# Patient Record
Sex: FEMALE | Race: ASIAN | NOT HISPANIC OR LATINO | Employment: FULL TIME | URBAN - METROPOLITAN AREA
[De-identification: names, ages, dates, MRNs, and addresses within clinical notes are randomized per-mention and may not be internally consistent; named-entity substitution may affect disease eponyms.]

---

## 2018-01-23 ENCOUNTER — OFFICE VISIT (OUTPATIENT)
Dept: FAMILY MEDICINE | Facility: CLINIC | Age: 43
End: 2018-01-23
Payer: COMMERCIAL

## 2018-01-23 VITALS
WEIGHT: 139.2 LBS | HEART RATE: 85 BPM | BODY MASS INDEX: 28.06 KG/M2 | HEIGHT: 59 IN | DIASTOLIC BLOOD PRESSURE: 84 MMHG | TEMPERATURE: 98.3 F | OXYGEN SATURATION: 100 % | SYSTOLIC BLOOD PRESSURE: 126 MMHG

## 2018-01-23 DIAGNOSIS — Z23 NEED FOR PROPHYLACTIC VACCINATION AND INOCULATION AGAINST INFLUENZA: ICD-10-CM

## 2018-01-23 DIAGNOSIS — E03.9 HYPOTHYROIDISM, UNSPECIFIED TYPE: ICD-10-CM

## 2018-01-23 DIAGNOSIS — Z01.00 EXAMINATION OF EYES AND VISION: ICD-10-CM

## 2018-01-23 DIAGNOSIS — Z00.00 ROUTINE GENERAL MEDICAL EXAMINATION AT A HEALTH CARE FACILITY: Primary | ICD-10-CM

## 2018-01-23 DIAGNOSIS — R53.83 FATIGUE, UNSPECIFIED TYPE: ICD-10-CM

## 2018-01-23 DIAGNOSIS — Z11.51 SCREENING FOR HUMAN PAPILLOMAVIRUS: ICD-10-CM

## 2018-01-23 DIAGNOSIS — Z13.1 SCREENING FOR DIABETES MELLITUS: ICD-10-CM

## 2018-01-23 DIAGNOSIS — Z12.4 SCREENING FOR CERVICAL CANCER: ICD-10-CM

## 2018-01-23 DIAGNOSIS — F99 INSOMNIA DUE TO OTHER MENTAL DISORDER: ICD-10-CM

## 2018-01-23 DIAGNOSIS — H57.9 ITCHY EYES: ICD-10-CM

## 2018-01-23 DIAGNOSIS — N89.8 VAGINAL DISCHARGE: ICD-10-CM

## 2018-01-23 DIAGNOSIS — Z13.220 SCREENING FOR LIPOID DISORDERS: ICD-10-CM

## 2018-01-23 DIAGNOSIS — F51.05 INSOMNIA DUE TO OTHER MENTAL DISORDER: ICD-10-CM

## 2018-01-23 LAB
HBA1C MFR BLD: 5.8 % (ref 4.3–6)
SPECIMEN SOURCE: NORMAL
WET PREP SPEC: NORMAL

## 2018-01-23 PROCEDURE — 80061 LIPID PANEL: CPT | Performed by: NURSE PRACTITIONER

## 2018-01-23 PROCEDURE — G0145 SCR C/V CYTO,THINLAYER,RESCR: HCPCS | Performed by: NURSE PRACTITIONER

## 2018-01-23 PROCEDURE — 82306 VITAMIN D 25 HYDROXY: CPT | Performed by: NURSE PRACTITIONER

## 2018-01-23 PROCEDURE — 99396 PREV VISIT EST AGE 40-64: CPT | Mod: 25 | Performed by: NURSE PRACTITIONER

## 2018-01-23 PROCEDURE — 87210 SMEAR WET MOUNT SALINE/INK: CPT | Performed by: NURSE PRACTITIONER

## 2018-01-23 PROCEDURE — 84443 ASSAY THYROID STIM HORMONE: CPT | Performed by: NURSE PRACTITIONER

## 2018-01-23 PROCEDURE — 90686 IIV4 VACC NO PRSV 0.5 ML IM: CPT | Performed by: NURSE PRACTITIONER

## 2018-01-23 PROCEDURE — G0476 HPV COMBO ASSAY CA SCREEN: HCPCS | Performed by: NURSE PRACTITIONER

## 2018-01-23 PROCEDURE — 84439 ASSAY OF FREE THYROXINE: CPT | Performed by: NURSE PRACTITIONER

## 2018-01-23 PROCEDURE — 83036 HEMOGLOBIN GLYCOSYLATED A1C: CPT | Performed by: NURSE PRACTITIONER

## 2018-01-23 PROCEDURE — 36415 COLL VENOUS BLD VENIPUNCTURE: CPT | Performed by: NURSE PRACTITIONER

## 2018-01-23 PROCEDURE — 90471 IMMUNIZATION ADMIN: CPT | Performed by: NURSE PRACTITIONER

## 2018-01-23 RX ORDER — LEVOTHYROXINE SODIUM 88 UG/1
88 TABLET ORAL
COMMUNITY
Start: 2017-08-24 | End: 2018-01-23

## 2018-01-23 RX ORDER — LEVOTHYROXINE SODIUM 88 UG/1
88 TABLET ORAL DAILY
Qty: 30 TABLET | Refills: 11 | Status: SHIPPED | OUTPATIENT
Start: 2018-01-23 | End: 2018-01-25

## 2018-01-23 RX ORDER — ZOLPIDEM TARTRATE 5 MG/1
5 TABLET ORAL
Qty: 14 TABLET | Refills: 0 | Status: SHIPPED | OUTPATIENT
Start: 2018-01-23 | End: 2018-03-26

## 2018-01-23 NOTE — PATIENT INSTRUCTIONS
"  Preventive Health Recommendations  Female Ages 40 to 49    Yearly exam:     See your health care provider every year in order to  1. Review health changes.   2. Discuss preventive care.    3. Review your medicines if your doctor prescribed any.      Get a Pap test every three years (unless you have an abnormal result and your provider advises testing more often).      If you get Pap tests with HPV test, you only need to test every 5 years, unless you have an abnormal result. You do not need a Pap test if your uterus was removed (hysterectomy) and you have not had cancer.      You should be tested each year for STDs (sexually transmitted diseases), if you're at risk.       Ask your doctor if you should have a mammogram.      Have a colonoscopy (test for colon cancer) if someone in your family has had colon cancer or polyps before age 50.       Have a cholesterol test every 5 years.       Have a diabetes test (fasting glucose) after age 45. If you are at risk for diabetes, you should have this test every 3 years.    Shots: Get a flu shot each year. Get a tetanus shot every 10 years.     Nutrition:     Eat at least 5 servings of fruits and vegetables each day.    Eat whole-grain bread, whole-wheat pasta and brown rice instead of white grains and rice.    Talk to your provider about Calcium and Vitamin D.     Lifestyle    Exercise at least 150 minutes a week (an average of 30 minutes a day, 5 days a week). This will help you control your weight and prevent disease.    Limit alcohol to one drink per day.    No smoking.     Wear sunscreen to prevent skin cancer.  See your dentist every six months for an exam and cleaning.Tips for Sleep Hygiene  \"Sleep hygiene\" means having good sleep habits.Follow these tips to sleep better at night:   Get on a schedule. Go to bed and get up at about the same time every day.  Listen to your body. Only try to sleep when you actually feel tired or sleepy.  Be patient. If you haven't been " "able to get to sleep after about 30 minutes or more, get up and do something calming or boring until you feel sleepy. Then return to bed and try again.  Don't have caffeine (coffee, tea, cola drinks, chocolate and some medicines), alcohol or nicotine (cigarettes). These can make it harder for you to fall asleep and stay asleep.  Use your bed for sleeping only. That means no TV, computer or homework in bed, especially during the evening and before bedtime.  Don't nap during the day. If you must nap, make sure it is for less than 20 minutes.  Create sleep rituals that remind your body it is time to sleep. Examples include breathing exercises, stretching or reading a book.  Avoid all electronic media (smart phone, computer, tablet) within 2 hours of bed time. The \"blue light\" in these devices activates the part of the brain that keeps you awake.  Dim the lights at night.  Get early morning sources of light (walk in the sunshine) to help set sleep patterns at night.  Try a bath or shower before bed. Having a warm bath 1 to 2 hours before bedtime can help you feel sleepy. Hot baths can make you alert, so be mindful of the temperature.  Don't watch the clock. Checking the clock during the night can wake you up. It can also lead to negative thoughts such as, \"I will never fall asleep,\" which can increase anxiety and sleeplessness.  Use a sleep diary. Track your sleep schedule to know your sleep patterns and to see where you can improve.  Get regular exercise every day. Try not to do heavy exercise in the 4 hours before bedtime.  Eat a healthy, balanced diet.  Try eating a light, healthy snack before bed, but avoid eating a heavy meal.  Create the right sleeping area. A cool, dark, quiet room is best. If needed, try earplugs, fans and blackout curtains.  Keep your daytime routine the same even if you have a bad night sleep. Avoiding activities the next day can make it harder to sleep.  For informational purposes only. Not " to replace the advice of your health care provider.   Copyright   2013 [employer]. All rights reserved. [employer] [id number]   01/16.

## 2018-01-23 NOTE — PROGRESS NOTES
"  SUBJECTIVE:   Bridgett Hardin is a 42 year old female who presents to clinic today for the following health issues:  {Provider please address medication reconciliation discrepancies--rooming staff please delete if no med/rec issues}    {Superlists:837744}    {additional problems for provider to add:700404}    Problem list and histories reviewed & adjusted, as indicated.  Additional history: {NONE - AS DOCUMENTED:187454::\"as documented\"}    {HIST REVIEW/ LINKS 2:572326}    Reviewed and updated as needed this visit by clinical staff       Reviewed and updated as needed this visit by Provider         {PROVIDER CHARTING PREFERENCE:889324}  "

## 2018-01-23 NOTE — NURSING NOTE
"Chief Complaint   Patient presents with     Physical       Initial /84  Pulse 85  Temp 98.3  F (36.8  C) (Oral)  Ht 4' 11\" (1.499 m)  Wt 139 lb 3.2 oz (63.1 kg)  LMP 01/01/2018 (Approximate)  SpO2 100%  Breastfeeding? No  BMI 28.11 kg/m2 Estimated body mass index is 28.11 kg/(m^2) as calculated from the following:    Height as of this encounter: 4' 11\" (1.499 m).    Weight as of this encounter: 139 lb 3.2 oz (63.1 kg).  Medication Reconciliation: complete     Carmen Hicks MA  "

## 2018-01-23 NOTE — MR AVS SNAPSHOT
After Visit Summary   1/23/2018    Bridgett Hardin    MRN: [id number]           Patient Information     Date Of Birth          1975        Visit Information        Provider Department      1/23/2018 4:20 PM America Andre NP [facility]        Today's Diagnoses     Screening for cervical cancer    -  1    Screening for human papillomavirus        Screening for diabetes mellitus        Hypothyroidism, unspecified type        Screening for lipoid disorders        Insomnia due to other mental disorder        Fatigue, unspecified type        Examination of eyes and vision        Itchy eyes        Vaginal discharge          Care Instructions      Preventive Health Recommendations  Female Ages 40 to 49    Yearly exam:     See your health care provider every year in order to  1. Review health changes.   2. Discuss preventive care.    3. Review your medicines if your doctor prescribed any.      Get a Pap test every three years (unless you have an abnormal result and your provider advises testing more often).      If you get Pap tests with HPV test, you only need to test every 5 years, unless you have an abnormal result. You do not need a Pap test if your uterus was removed (hysterectomy) and you have not had cancer.      You should be tested each year for STDs (sexually transmitted diseases), if you're at risk.       Ask your doctor if you should have a mammogram.      Have a colonoscopy (test for colon cancer) if someone in your family has had colon cancer or polyps before age 50.       Have a cholesterol test every 5 years.       Have a diabetes test (fasting glucose) after age 45. If you are at risk for diabetes, you should have this test every 3 years.    Shots: Get a flu shot each year. Get a tetanus shot every 10 years.     Nutrition:     Eat at least 5 servings of fruits and vegetables each day.    Eat whole-grain bread, whole-wheat pasta and brown rice instead of white grains and  "rice.    Talk to your provider about Calcium and Vitamin D.     Lifestyle    Exercise at least 150 minutes a week (an average of 30 minutes a day, 5 days a week). This will help you control your weight and prevent disease.    Limit alcohol to one drink per day.    No smoking.     Wear sunscreen to prevent skin cancer.  See your dentist every six months for an exam and cleaning.Tips for Sleep Hygiene  \"Sleep hygiene\" means having good sleep habits.Follow these tips to sleep better at night:   Get on a schedule. Go to bed and get up at about the same time every day.  Listen to your body. Only try to sleep when you actually feel tired or sleepy.  Be patient. If you haven't been able to get to sleep after about 30 minutes or more, get up and do something calming or boring until you feel sleepy. Then return to bed and try again.  Don't have caffeine (coffee, tea, cola drinks, chocolate and some medicines), alcohol or nicotine (cigarettes). These can make it harder for you to fall asleep and stay asleep.  Use your bed for sleeping only. That means no TV, computer or homework in bed, especially during the evening and before bedtime.  Don't nap during the day. If you must nap, make sure it is for less than 20 minutes.  Create sleep rituals that remind your body it is time to sleep. Examples include breathing exercises, stretching or reading a book.  Avoid all electronic media (smart phone, computer, tablet) within 2 hours of bed time. The \"blue light\" in these devices activates the part of the brain that keeps you awake.  Dim the lights at night.  Get early morning sources of light (walk in the sunshine) to help set sleep patterns at night.  Try a bath or shower before bed. Having a warm bath 1 to 2 hours before bedtime can help you feel sleepy. Hot baths can make you alert, so be mindful of the temperature.  Don't watch the clock. Checking the clock during the night can wake you up. It can also lead to negative thoughts " "such as, \"I will never fall asleep,\" which can increase anxiety and sleeplessness.  Use a sleep diary. Track your sleep schedule to know your sleep patterns and to see where you can improve.  Get regular exercise every day. Try not to do heavy exercise in the 4 hours before bedtime.  Eat a healthy, balanced diet.  Try eating a light, healthy snack before bed, but avoid eating a heavy meal.  Create the right sleeping area. A cool, dark, quiet room is best. If needed, try earplugs, fans and blackout curtains.  Keep your daytime routine the same even if you have a bad night sleep. Avoiding activities the next day can make it harder to sleep.  For informational purposes only. Not to replace the advice of your health care provider.   Copyright   2013 [facility]. All rights reserved. [employer] [id number] - 01/16.              Follow-ups after your visit        Additional Services     OPTOMETRY REFERRAL       Your provider has referred you to: FMG: [facility] [phone]   [email or web address]  FM[facility]: [facility] [phone]    [email or web address]/  N: [facility] [phone]   [email or web address]  [place]  [phone]   [email or web address]    Please be aware that coverage of these services is subject to the terms and limitations of your health insurance plan.  Call member services at your health plan with any benefit or coverage questions.      Please bring the following with you to your appointment:    (1) Any X-Rays, CTs or MRIs which have been performed.  Contact the facility where they were done to arrange for  prior to your scheduled appointment.    (2) List of current medications  (3) This referral request   (4) Any documents/labs given to you for this referral                  Follow-up notes from your care team     Return if symptoms worsen or " "fail to improve.      Who to contact     Normal or non-critical lab and imaging results will be communicated to you by MyChart, letter or phone within 4 business days after the clinic has received the results. If you do not hear from us within 7 days, please contact the clinic through MyChart or phone. If you have a critical or abnormal lab result, we will notify you by phone as soon as possible.  Submit refill requests through [employer] or call your pharmacy and they will forward the refill request to us. Please allow 3 business days for your refill to be completed.          If you need to speak with a  for additional information , please call: [phone]             Additional Information About Your Visit        Care EveryWhere ID     This is your Care EveryWhere ID. This could be used by other organizations to access your [place] medical records  [id number]        Your Vitals Were     Pulse Temperature Height Last Period Pulse Oximetry Breastfeeding?    85 98.3  F (36.8  C) (Oral) 4' 11\" (1.499 m) 01/01/2018 (Approximate) 100% No    BMI (Body Mass Index)                   28.11 kg/m2            Blood Pressure from Last 3 Encounters:   01/23/18 126/84    Weight from Last 3 Encounters:   01/23/18 139 lb 3.2 oz (63.1 kg)              We Performed the Following     Hemoglobin A1c     HPV High Risk Types DNA Cervical     Lipid panel reflex to direct LDL Non-fasting     OPTOMETRY REFERRAL     Pap imaged thin layer screen with HPV - recommended age 30 - 65 years (select HPV order below)     TSH with free T4 reflex     Vitamin D Deficiency     Wet prep          Today's Medication Changes          These changes are accurate as of: 1/23/18  4:42 PM.  If you have any questions, ask your nurse or doctor.               Start taking these medicines.        Dose/Directions    naphazoline-pheniramine Soln ophthalmic solution   Commonly known as:  OPCON-A/VISINE A/NAPHCON A   Used for:  Itchy eyes   Started " by:  America Andre NP        Dose:  1 drop   Place 1 drop into both eyes 4 times daily as needed for irritation or other (itching)   Quantity:  15 mL   Refills:  1       zolpidem 5 MG tablet   Commonly known as:  AMBIEN   Used for:  Insomnia due to other mental disorder   Started by:  America Andre NP        Dose:  5 mg   Take 1 tablet (5 mg) by mouth nightly as needed for sleep Take and go directly to sleep for at least 7 hours   Quantity:  14 tablet   Refills:  0         These medicines have changed or have updated prescriptions.        Dose/Directions    levothyroxine 88 MCG tablet   Commonly known as:  SYNTHROID/LEVOTHROID   This may have changed:  when to take this   Used for:  Hypothyroidism, unspecified type   Changed by:  America Andre NP        Dose:  88 mcg   Take 1 tablet (88 mcg) by mouth daily   Quantity:  30 tablet   Refills:  11            Where to get your medicines      These medications were sent to [facility] [address] [place] [address] [place], [place] - [address] AT [facility] [address], [place] [address]     Phone:  [phone]     levothyroxine 88 MCG tablet    naphazoline-pheniramine Soln ophthalmic solution         Some of these will need a paper prescription and others can be bought over the counter.  Ask your nurse if you have questions.     Bring a paper prescription for each of these medications     zolpidem 5 MG tablet                Primary Care Provider Office Phone # Fax #    [facility] [phone] [address] [facility]  [place] [address]        Equal Access to Services     RICCI FIGUEROA AH: Hadii aad ku hadasho Soomaali, waaxda luqadaha, qaybta kaalmada adeegyada, suresh idiin hayjose yang. So [facility] [phone].    ATENCIÓN: Si habla español, tiene a harrison disposición servicios gratuitos de asistencia lingüística. Arianna al [phone].    We comply with applicable federal civil rights laws and  [place] laws. We do not discriminate on the basis of race, color, national origin, age, disability, sex, sexual orientation, or gender identity.            Thank you!     Thank you for choosing [facility]  for your care. Our goal is always to provide you with excellent care. Hearing back from our patients is one way we can continue to improve our services. Please take a few minutes to complete the written survey that you may receive in the mail after your visit with us. Thank you!             Your Updated Medication List - Protect others around you: Learn how to safely use, store and throw away your medicines at www.disposemymeds.org.          This list is accurate as of: 1/23/18  4:42 PM.  Always use your most recent med list.                   Brand Name Dispense Instructions for use Diagnosis    levothyroxine 88 MCG tablet    SYNTHROID/LEVOTHROID    30 tablet    Take 1 tablet (88 mcg) by mouth daily    Hypothyroidism, unspecified type       naphazoline-pheniramine Soln ophthalmic solution    OPCON-A/VISINE A/NAPHCON A    15 mL    Place 1 drop into both eyes 4 times daily as needed for irritation or other (itching)    Itchy eyes       zolpidem 5 MG tablet    AMBIEN    14 tablet    Take 1 tablet (5 mg) by mouth nightly as needed for sleep Take and go directly to sleep for at least 7 hours    Insomnia due to other mental disorder

## 2018-01-23 NOTE — PROGRESS NOTES
SUBJECTIVE:   CC: Bridgett Hardin is an 42 year old woman who presents for preventive health visit.     Healthy Habits:    Do you get at least three servings of calcium containing foods daily (dairy, green leafy vegetables, etc.)? no    Amount of exercise or daily activities, outside of work: 2 day(s) per week    Problems taking medications regularly No    Medication side effects: No    Have you had an eye exam in the past two years? yes    Do you see a dentist twice per year? yes    Do you have sleep apnea, excessive snoring or daytime drowsiness?no    Patient/Parent of patient informed that anything we discuss that is not related to preventative medicine, may be billed for; patient verbalizes understanding.    Concern(s):  1. Sleep issues  2. Depression- thinks depression is from divorce and related to lack of sleep, would like to try sleep medication prior to antidepressant.        Today's PHQ-2 Score: No flowsheet data found.      Abuse: Current or Past(Physical, Sexual or Emotional)- No  Do you feel safe in your environment - Yes  Social History   Substance Use Topics     Smoking status: Never Smoker     Smokeless tobacco: Never Used     Alcohol use No     If you drink alcohol do you typically have >3 drinks per day or >7 drinks per week? Not Applicable                     Reviewed orders with patient.  Reviewed health maintenance and updated orders accordingly - Yes  Labs reviewed in EPIC  BP Readings from Last 3 Encounters:   01/23/18 126/84    Wt Readings from Last 3 Encounters:   01/23/18 139 lb 3.2 oz (63.1 kg)                  There is no problem list on file for this patient.    History reviewed. No pertinent surgical history.    Social History   Substance Use Topics     Smoking status: Never Smoker     Smokeless tobacco: Never Used     Alcohol use No     History reviewed. No pertinent family history.      Current Outpatient Prescriptions   Medication Sig Dispense Refill     zolpidem (AMBIEN) 5 MG tablet  Take 1 tablet (5 mg) by mouth nightly as needed for sleep Take and go directly to sleep for at least 7 hours 14 tablet 0     naphazoline-pheniramine (OPCON-A/VISINE A/NAPHCON A) SOLN ophthalmic solution Place 1 drop into both eyes 4 times daily as needed for irritation or other (itching) 15 mL 1     [DISCONTINUED] levothyroxine (SYNTHROID/LEVOTHROID) 88 MCG tablet Take 1 tablet (88 mcg) by mouth daily 30 tablet 11     levothyroxine (SYNTHROID/LEVOTHROID) 50 MCG tablet Take 1 tablet (50 mcg) by mouth daily 90 tablet 0     cholecalciferol (VITAMIN D3) 88488 UNITS capsule Take 1 capsule (50,000 Units) by mouth once a week 7 capsule 0     No Known Allergies    Patient under age 50, mutual decision reflected in health maintenance.      Pertinent mammograms are reviewed under the imaging tab.  History of abnormal Pap smear: NO - age 30- 65 PAP every 3 years recommended    Reviewed and updated as needed this visit by clinical staff  Tobacco  Allergies  Meds  Med Hx  Surg Hx  Fam Hx  Soc Hx        Reviewed and updated as needed this visit by Provider        History reviewed. No pertinent past medical history.   History reviewed. No pertinent surgical history.  Obstetric History     No data available          ROS:  C: NEGATIVE for fever, chills, change in weight  I: NEGATIVE for worrisome rashes, moles or lesions  E: NEGATIVE for vision changes or irritation  ENT: NEGATIVE for ear, mouth and throat problems  R: NEGATIVE for significant cough or SOB  B: NEGATIVE for masses, tenderness or discharge  CV: NEGATIVE for chest pain, palpitations or peripheral edema  GI: NEGATIVE for nausea, abdominal pain, heartburn, or change in bowel habits  : NEGATIVE for unusual urinary or vaginal symptoms. Periods are regular.  M: NEGATIVE for significant arthralgias or myalgia  N: NEGATIVE for weakness, dizziness or paresthesias  E: NEGATIVE for temperature intolerance, skin/hair changes  H: NEGATIVE for bleeding problems  P: NEGATIVE  "for changes in mood or affect    OBJECTIVE:   /84  Pulse 85  Temp 98.3  F (36.8  C) (Oral)  Ht 4' 11\" (1.499 m)  Wt 139 lb 3.2 oz (63.1 kg)  LMP 01/01/2018 (Approximate)  SpO2 100%  Breastfeeding? No  BMI 28.11 kg/m2  EXAM:  GENERAL: healthy, alert and no distress  EYES: Eyes grossly normal to inspection, PERRL and conjunctivae and sclerae normal  HENT: ear canals and TM's normal, nose and mouth without ulcers or lesions  NECK: no adenopathy, no asymmetry, masses, or scars and thyroid normal to palpation  RESP: lungs clear to auscultation - no rales, rhonchi or wheezes  BREAST: deferred per pt  CV: regular rate and rhythm, normal S1 S2, no S3 or S4, no murmur, click or rub, no peripheral edema and peripheral pulses strong  ABDOMEN: soft, nontender, no hepatosplenomegaly, no masses and bowel sounds normal   (female): normal female external genitalia, normal urethral meatus, vaginal mucosa pink, moist, well rugated, and normal cervix/adnexa/uterus without masses or discharge  RECTAL: normal sphincter tone  MS: no gross musculoskeletal defects noted, no edema  SKIN: no suspicious lesions or rashes  NEURO: Normal strength and tone, mentation intact and speech normal  PSYCH: mentation appears normal, affect normal/bright  LYMPH: no cervical, supraclavicular, axillary adenopathy    ASSESSMENT/PLAN:       ICD-10-CM    1. Routine general medical examination at a health care facility Z00.00    2. Screening for cervical cancer Z12.4 Pap imaged thin layer screen with HPV - recommended age 30 - 65 years (select HPV order below)     T4 free   3. Screening for human papillomavirus Z11.51 HPV High Risk Types DNA Cervical   4. Screening for diabetes mellitus Z13.1 Hemoglobin A1c   5. Hypothyroidism, unspecified type E03.9 TSH with free T4 reflex     DISCONTINUED: levothyroxine (SYNTHROID/LEVOTHROID) 88 MCG tablet   6. Screening for lipoid disorders Z13.220 Lipid panel reflex to direct LDL Non-fasting   7. Insomnia " "due to other mental disorder F51.05 zolpidem (AMBIEN) 5 MG tablet    F99     depression- improving   8. Fatigue, unspecified type R53.83 Vitamin D Deficiency   9. Examination of eyes and vision Z01.00 OPTOMETRY REFERRAL   10. Itchy eyes H57.8 naphazoline-pheniramine (OPCON-A/VISINE A/NAPHCON A) SOLN ophthalmic solution   11. Vaginal discharge N89.8 Wet prep   12. Need for prophylactic vaccination and inoculation against influenza Z23 FLU VAC, SPLIT VIRUS IM > 3 YO (QUADRIVALENT) [22733]     Vaccine Administration, Initial [95033]       COUNSELING:   Reviewed preventive health counseling, as reflected in patient instructions       reports that she has never smoked. She has never used smokeless tobacco.    Estimated body mass index is 28.11 kg/(m^2) as calculated from the following:    Height as of this encounter: 4' 11\" (1.499 m).    Weight as of this encounter: 139 lb 3.2 oz (63.1 kg).   Weight management plan: Discussed healthy diet and exercise guidelines and patient will follow up in 12 months in clinic to re-evaluate.    Counseling Resources:  ATP IV Guidelines  Pooled Cohorts Equation Calculator  Breast Cancer Risk Calculator  FRAX Risk Assessment  ICSI Preventive Guidelines  Dietary Guidelines for Americans, 2010  USDA's MyPlate  ASA Prophylaxis  Lung CA Screening    EDITH Frank  [facility]  Injectable Influenza Immunization Documentation    1.  Is the person to be vaccinated sick today?   No    2. Does the person to be vaccinated have an allergy to a component   of the vaccine?   No  Egg Allergy Algorithm Link    3. Has the person to be vaccinated ever had a serious reaction   to influenza vaccine in the past?   No    4. Has the person to be vaccinated ever had Guillain-Barré syndrome?   No    Form completed by Carmen Hicks MA           "

## 2018-01-23 NOTE — LETTER
February 1, 2018    Bridgett Hardin  [address] [facility] [address]    Dear Bridgett,  We are happy to inform you that your PAP smear result from 1/23/18 is normal.  We are now able to do a follow up test on PAP smears. The DNA test is for HPV (Human Papilloma Virus). Cervical cancer is closely linked with certain types of HPV. Your result showed no evidence of high risk HPV.  Therefore we recommend you return in 3 years for your next pap smear.  You will still need to return to the clinic every year for an annual exam and other preventive tests.  Please contact the clinic at [phone] with any questions.  Sincerely,    America Andre NP/florin

## 2018-01-24 LAB
CHOLEST SERPL-MCNC: 163 MG/DL
DEPRECATED CALCIDIOL+CALCIFEROL SERPL-MC: 11 UG/L (ref 20–75)
HDLC SERPL-MCNC: 56 MG/DL
LDLC SERPL CALC-MCNC: 88 MG/DL
NONHDLC SERPL-MCNC: 107 MG/DL
T4 FREE SERPL-MCNC: 0.62 NG/DL (ref 0.76–1.46)
TRIGL SERPL-MCNC: 94 MG/DL
TSH SERPL DL<=0.005 MIU/L-ACNC: 5.85 MU/L (ref 0.4–4)

## 2018-01-25 ENCOUNTER — TELEPHONE (OUTPATIENT)
Dept: FAMILY MEDICINE | Facility: CLINIC | Age: 43
End: 2018-01-25

## 2018-01-25 DIAGNOSIS — E55.9 VITAMIN D DEFICIENCY: Primary | ICD-10-CM

## 2018-01-25 DIAGNOSIS — E03.9 HYPOTHYROIDISM, UNSPECIFIED TYPE: ICD-10-CM

## 2018-01-25 RX ORDER — LEVOTHYROXINE SODIUM 50 UG/1
50 TABLET ORAL DAILY
Qty: 90 TABLET | Refills: 0 | Status: SHIPPED | OUTPATIENT
Start: 2018-01-25 | End: 2018-04-16

## 2018-01-25 NOTE — PROGRESS NOTES
Please call pt, vitamin D is low, I have sent in a replacement for her, this could be causing several of her symptoms.  Pt also has hyperthyroid, referral to endocrinology for further evaluation.     America Andre, EDITH

## 2018-01-25 NOTE — TELEPHONE ENCOUNTER
[place], no endocrine referral, will lower dose of synthroid. Please notify pt. America Andre, CORINNE, FNP-BC

## 2018-01-25 NOTE — TELEPHONE ENCOUNTER
Left message with below info on voicemail ,she had previously told me to leave message she was bringing daughter to dentist

## 2018-01-26 LAB
COPATH REPORT: NORMAL
PAP: NORMAL

## 2018-01-29 PROBLEM — E03.9 HYPOTHYROIDISM, UNSPECIFIED TYPE: Status: ACTIVE | Noted: 2018-01-29

## 2018-01-30 LAB
FINAL DIAGNOSIS: NORMAL
HPV HR 12 DNA CVX QL NAA+PROBE: NEGATIVE
HPV16 DNA SPEC QL NAA+PROBE: NEGATIVE
HPV18 DNA SPEC QL NAA+PROBE: NEGATIVE
SPECIMEN DESCRIPTION: NORMAL
SPECIMEN SOURCE CVX/VAG CYTO: NORMAL

## 2018-03-26 ENCOUNTER — OFFICE VISIT (OUTPATIENT)
Dept: FAMILY MEDICINE | Facility: CLINIC | Age: 43
End: 2018-03-26
Payer: COMMERCIAL

## 2018-03-26 VITALS
SYSTOLIC BLOOD PRESSURE: 121 MMHG | RESPIRATION RATE: 16 BRPM | OXYGEN SATURATION: 98 % | HEART RATE: 99 BPM | BODY MASS INDEX: 28.56 KG/M2 | TEMPERATURE: 98.4 F | DIASTOLIC BLOOD PRESSURE: 84 MMHG | WEIGHT: 141.4 LBS

## 2018-03-26 DIAGNOSIS — Z32.01 PREGNANCY TEST POSITIVE: Primary | ICD-10-CM

## 2018-03-26 LAB — BETA HCG QUAL IFA URINE: POSITIVE

## 2018-03-26 PROCEDURE — 99213 OFFICE O/P EST LOW 20 MIN: CPT | Performed by: NURSE PRACTITIONER

## 2018-03-26 PROCEDURE — 84703 CHORIONIC GONADOTROPIN ASSAY: CPT | Performed by: NURSE PRACTITIONER

## 2018-03-26 RX ORDER — PNV NO.95/FERROUS FUM/FOLIC AC 28MG-0.8MG
1 TABLET ORAL DAILY
Qty: 90 TABLET | Refills: 3 | Status: SHIPPED | OUTPATIENT
Start: 2018-03-26 | End: 2018-05-09

## 2018-03-26 NOTE — MR AVS SNAPSHOT
After Visit Summary   3/26/2018    Bridgett Hardin    MRN: [id number]           Patient Information     Date Of Birth          1975        Visit Information        Provider Department      3/26/2018 11:00 AM America Andre NP [facility]        Today's Diagnoses     Pregnancy test positive    -  1      Care Instructions    Take daily prenatal vitamin, schedule with OB/GYN, follow up as needed.   Pregnancy    Your exam today shows that you are pregnant.  Pregnancy symptoms  During pregnancy your body s hormones change. This causes physical and emotional changes. This is normal. Knowing what to expect is important for your piece of mind and so you know when to seek help for a problem. Here are some of the most common symptoms:    Morning sickness or nausea. This can happen any time of the day or night.    Tender, swollen breasts    Need to urinate frequently    Tiredness or fatigue    Dizziness    Indigestion or heartburn    Food cravings or turn-offs    Constipation    Emotional changes. This can range from anxiety to excitement to depression.  General care for a healthy pregnancy  Here are things you can do to help make sure your baby is born healthy:    Rest when you feel tired. This is especially true in the later months of pregnancy.    Drink more fluids. Your body needs more fluids than you may be used to. Drink 8 to10 glasses of juice, milk, or water every day.    Eat well-balanced meals. Eat at regular times to give your body enough protein. You can expect to gain about 30 pounds during the pregnancy. Don t try to diet or lose weight while you are pregnant.    Take a prenatal vitamin every day. This helps you meet the extra nutritional needs of pregnancy.    Don t take any other medicine during your pregnancy unless your healthcare provider tells you to. This includes prescription medicines and those you buy over the counter. Many medicines can harm the growing baby.    If you have  nausea or vomiting, don t eat greasy or fried foods. Eat several smaller meals throughout the day rather than 3 large meals.    If you smoke, you must stop. The nicotine you breathe in goes right to the baby.    Stay away from alcohol, even in moderate amounts. Daily drinking will harm your baby and can cause permanent brain damage.    Don t use recreational drugs, especially cocaine, crack, and heroin. These will harm your baby. Also avoid marijuana.    If you were using recreational drugs or prescribed medicine when you found out that you were pregnant, talk with your healthcare provider about possible effects on your growing baby.    If you have medical problems that you need to take medicine for, talk with your healthcare provider.  Follow-up care  Call your healthcare provider to arrange for prenatal care. Prenatal care is important. You can see your family provider, a pregnancy specialist (obstetrician), or a primary care clinic.  When to seek medical advice  Call your healthcare provider right away if any of these occur:    Vaginal bleeding    Pain in your belly (abdomen) or back that is moderate or severe    Lots of vomiting, or you can t keep any fluids down for 6 hours    Burning feeling when you urinate    Headache, dizziness, or rapid weight gain    Fever    Vision changes or blurred vision  Date Last Reviewed: 10/1/2016    9458-6077 [employer]. [address] [place] [address], [place] [address]. All rights reserved. This information is not intended as a substitute for professional medical care. Always follow your healthcare professional's instructions.                Follow-ups after your visit        Additional Services     OB/GYN REFERRAL       Your provider has referred you to:  [facility] [phone]   [email or web address]  FM[facility]: Ashley Gill [facility] [place] [phone]   [email or web address]  FMG: Ashley Shaw  [facility] [place] [phone]   [email or web address]  [facility]: [facility] [phone]   [email or web address]   [facility]: [facility] [phone]   [email or web address]    Please be aware that coverage of these services is subject to the terms and limitations of your health insurance plan.  Call member services at your health plan with any benefit or coverage questions.      Please bring the following with you to your appointment:    (1) Any X-Rays, CTs or MRIs which have been performed.  Contact the facility where they were done to arrange for  prior to your scheduled appointment.   (2) List of current medications   (3) This referral request   (4) Any documents/labs given to you for this referral                  Follow-up notes from your care team     Return if symptoms worsen or fail to improve.      Who to contact     Normal or non-critical lab and imaging results will be communicated to you by MyChart, letter or phone within 4 business days after the clinic has received the results. If you do not hear from us within 7 days, please contact the clinic through MyChart or phone. If you have a critical or abnormal lab result, we will notify you by phone as soon as possible.  Submit refill requests through [employer] or call your pharmacy and they will forward the refill request to us. Please allow 3 business days for your refill to be completed.          If you need to speak with a  for additional information , please call: [phone]             Additional Information About Your Visit        Care EveryWhere ID     This is your Care EveryWhere ID. This could be used by other organizations to access your [facility] medical records  [id number]        Your Vitals Were     Pulse Temperature Respirations Last Period Pulse Oximetry Breastfeeding?    99 98.4  F (36.9  C)  (Oral) 16 02/22/2018 98% No    BMI (Body Mass Index)                   28.56 kg/m2            Blood Pressure from Last 3 Encounters:   03/26/18 121/84   01/23/18 126/84    Weight from Last 3 Encounters:   03/26/18 141 lb 6.4 oz (64.1 kg)   01/23/18 139 lb 3.2 oz (63.1 kg)              We Performed the Following     Beta HCG qual IFA urine     OB/GYN REFERRAL          Today's Medication Changes          These changes are accurate as of 3/26/18 11:09 AM.  If you have any questions, ask your nurse or doctor.               Start taking these medicines.        Dose/Directions    Prenatal Vitamins 28-0.8 MG Tabs   Used for:  Pregnancy test positive   Started by:  America Andre NP        Dose:  1 tablet   Take 1 tablet by mouth daily   Quantity:  90 tablet   Refills:  3            Where to get your medicines      These medications were sent to [facility] [address] [place] [address], [place] - [address] AT [facility] [address], [place] [address]     Phone:  [phone]     Prenatal Vitamins 28-0.8 MG Tabs                Primary Care Provider Office Phone # Fax #    [facility] [phone] [address] [facility] [address]        Equal Access to Services     RICCI FIGUEROA AH: Hadii aad ku hadasho Soomaali, waaxda luqadaha, qaybta kaalmada adeegyada, waxrajesh kiser hayjose yang. So [facility] [phone].    ATENCIÓN: Si habla español, tiene a harrison disposición servicios gratuitos de asistencia lingüística. Arianna al [phone].    We comply with applicable federal civil rights laws and [place] laws. We do not discriminate on the basis of race, color, national origin, age, disability, sex, sexual orientation, or gender identity.            Thank you!     Thank you for choosing [facility]  for your care. Our goal is always to provide you with excellent care. Hearing back from our patients is one way we can continue to improve our  services. Please take a few minutes to complete the written survey that you may receive in the mail after your visit with us. Thank you!             Your Updated Medication List - Protect others around you: Learn how to safely use, store and throw away your medicines at www.disposemymeds.org.          This list is accurate as of 3/26/18 11:09 AM.  Always use your most recent med list.                   Brand Name Dispense Instructions for use Diagnosis    levothyroxine 50 MCG tablet    SYNTHROID/LEVOTHROID    90 tablet    Take 1 tablet (50 mcg) by mouth daily    Hypothyroidism, unspecified type       naphazoline-pheniramine Soln ophthalmic solution    OPCON-A/VISINE A/NAPHCON A    15 mL    Place 1 drop into both eyes 4 times daily as needed for irritation or other (itching)    Itchy eyes       Prenatal Vitamins 28-0.8 MG Tabs     90 tablet    Take 1 tablet by mouth daily    Pregnancy test positive       zolpidem 5 MG tablet    AMBIEN    14 tablet    Take 1 tablet (5 mg) by mouth nightly as needed for sleep Take and go directly to sleep for at least 7 hours    Insomnia due to other mental disorder

## 2018-03-26 NOTE — PATIENT INSTRUCTIONS
Take daily prenatal vitamin, schedule with OB/GYN, follow up as needed.   Pregnancy    Your exam today shows that you are pregnant.  Pregnancy symptoms  During pregnancy your body s hormones change. This causes physical and emotional changes. This is normal. Knowing what to expect is important for your piece of mind and so you know when to seek help for a problem. Here are some of the most common symptoms:    Morning sickness or nausea. This can happen any time of the day or night.    Tender, swollen breasts    Need to urinate frequently    Tiredness or fatigue    Dizziness    Indigestion or heartburn    Food cravings or turn-offs    Constipation    Emotional changes. This can range from anxiety to excitement to depression.  General care for a healthy pregnancy  Here are things you can do to help make sure your baby is born healthy:    Rest when you feel tired. This is especially true in the later months of pregnancy.    Drink more fluids. Your body needs more fluids than you may be used to. Drink 8 to10 glasses of juice, milk, or water every day.    Eat well-balanced meals. Eat at regular times to give your body enough protein. You can expect to gain about 30 pounds during the pregnancy. Don t try to diet or lose weight while you are pregnant.    Take a prenatal vitamin every day. This helps you meet the extra nutritional needs of pregnancy.    Don t take any other medicine during your pregnancy unless your healthcare provider tells you to. This includes prescription medicines and those you buy over the counter. Many medicines can harm the growing baby.    If you have nausea or vomiting, don t eat greasy or fried foods. Eat several smaller meals throughout the day rather than 3 large meals.    If you smoke, you must stop. The nicotine you breathe in goes right to the baby.    Stay away from alcohol, even in moderate amounts. Daily drinking will harm your baby and can cause permanent brain damage.    Don t use  recreational drugs, especially cocaine, crack, and heroin. These will harm your baby. Also avoid marijuana.    If you were using recreational drugs or prescribed medicine when you found out that you were pregnant, talk with your healthcare provider about possible effects on your growing baby.    If you have medical problems that you need to take medicine for, talk with your healthcare provider.  Follow-up care  Call your healthcare provider to arrange for prenatal care. Prenatal care is important. You can see your family provider, a pregnancy specialist (obstetrician), or a primary care clinic.  When to seek medical advice  Call your healthcare provider right away if any of these occur:    Vaginal bleeding    Pain in your belly (abdomen) or back that is moderate or severe    Lots of vomiting, or you can t keep any fluids down for 6 hours    Burning feeling when you urinate    Headache, dizziness, or rapid weight gain    Fever    Vision changes or blurred vision  Date Last Reviewed: 10/1/2016    9519-1709 [employer]. [address] [place] [address], [place] [address]. All rights reserved. This information is not intended as a substitute for professional medical care. Always follow your healthcare professional's instructions.

## 2018-03-26 NOTE — PROGRESS NOTES
Results discussed directly with patient while patient was present. Any further details documented in the note.   America Andre NP

## 2018-03-26 NOTE — PROGRESS NOTES
SUBJECTIVE:   Bridgett Hardin is a 42 year old female who presents to clinic today for the following health issues:      Patient is here for a pregnancy test  LMP: 2/22/18  Test done at home : yes  Result: positive   Trying to get pregnant- no  Smoking or ETOH use: no      Problem list and histories reviewed & adjusted, as indicated.  Additional history: as documented    Patient Active Problem List   Diagnosis     Hypothyroidism, unspecified type     History reviewed. No pertinent surgical history.    Social History   Substance Use Topics     Smoking status: Never Smoker     Smokeless tobacco: Never Used     Alcohol use No     History reviewed. No pertinent family history.      Current Outpatient Prescriptions   Medication Sig Dispense Refill     Prenatal Vit-Fe Fumarate-FA (PRENATAL VITAMINS) 28-0.8 MG TABS Take 1 tablet by mouth daily 90 tablet 3     levothyroxine (SYNTHROID/LEVOTHROID) 50 MCG tablet Take 1 tablet (50 mcg) by mouth daily 90 tablet 0     naphazoline-pheniramine (OPCON-A/VISINE A/NAPHCON A) SOLN ophthalmic solution Place 1 drop into both eyes 4 times daily as needed for irritation or other (itching) 15 mL 1     No Known Allergies  BP Readings from Last 3 Encounters:   03/26/18 121/84   01/23/18 126/84    Wt Readings from Last 3 Encounters:   03/26/18 141 lb 6.4 oz (64.1 kg)   01/23/18 139 lb 3.2 oz (63.1 kg)                  Labs reviewed in EPIC    Reviewed and updated as needed this visit by clinical staff  Tobacco  Allergies  Meds  Problems  Med Hx  Surg Hx  Fam Hx  Soc Hx        Reviewed and updated as needed this visit by Provider  Allergies  Meds  Problems         ROS:  Constitutional, HEENT, cardiovascular, pulmonary, GI, , musculoskeletal, neuro, skin, endocrine and psych systems are negative, except as otherwise noted.    OBJECTIVE:     /84  Pulse 99  Temp 98.4  F (36.9  C) (Oral)  Resp 16  Wt 141 lb 6.4 oz (64.1 kg)  LMP 02/22/2018  SpO2 98%  Breastfeeding? No  BMI  28.56 kg/m2  Body mass index is 28.56 kg/(m^2).  GENERAL: healthy, alert and no distress  RESP: lungs clear to auscultation - no rales, rhonchi or wheezes  CV: regular rate and rhythm, normal S1 S2, no S3 or S4, no murmur, click or rub, no peripheral edema and peripheral pulses strong  PSYCH: mentation appears normal, affect normal/bright    Diagnostic Test Results:  See orders    ASSESSMENT/PLAN:         ICD-10-CM    1. Pregnancy test positive Z32.01 Beta HCG qual IFA urine     Prenatal Vit-Fe Fumarate-FA (PRENATAL VITAMINS) 28-0.8 MG TABS     OB/GYN REFERRAL       See Patient Instructions: Take daily prenatal vitamin, schedule with OB/GYN, follow up as needed.     America Andre, EDITH  [facility]

## 2018-03-26 NOTE — NURSING NOTE
"Chief Complaint   Patient presents with     Pregnancy Test       Initial /84  Pulse 99  Temp 98.4  F (36.9  C) (Oral)  Resp 16  Wt 141 lb 6.4 oz (64.1 kg)  LMP 03/22/2018 (Approximate)  SpO2 98%  Breastfeeding? No  BMI 28.56 kg/m2 Estimated body mass index is 28.56 kg/(m^2) as calculated from the following:    Height as of 1/23/18: 4' 11\" (1.499 m).    Weight as of this encounter: 141 lb 6.4 oz (64.1 kg).  Medication Reconciliation: complete     Carmen Hicks MA  "

## 2018-04-16 ENCOUNTER — OFFICE VISIT (OUTPATIENT)
Dept: FAMILY MEDICINE | Facility: CLINIC | Age: 43
End: 2018-04-16
Payer: COMMERCIAL

## 2018-04-16 VITALS
HEART RATE: 72 BPM | DIASTOLIC BLOOD PRESSURE: 72 MMHG | WEIGHT: 145.4 LBS | TEMPERATURE: 98.1 F | BODY MASS INDEX: 29.37 KG/M2 | OXYGEN SATURATION: 100 % | RESPIRATION RATE: 16 BRPM | SYSTOLIC BLOOD PRESSURE: 104 MMHG

## 2018-04-16 DIAGNOSIS — E03.9 HYPOTHYROIDISM, UNSPECIFIED TYPE: Primary | ICD-10-CM

## 2018-04-16 DIAGNOSIS — Z32.01 PREGNANCY EXAMINATION OR TEST, POSITIVE RESULT: ICD-10-CM

## 2018-04-16 LAB — TSH SERPL DL<=0.005 MIU/L-ACNC: 3.16 MU/L (ref 0.4–4)

## 2018-04-16 PROCEDURE — 99214 OFFICE O/P EST MOD 30 MIN: CPT | Performed by: NURSE PRACTITIONER

## 2018-04-16 PROCEDURE — 36415 COLL VENOUS BLD VENIPUNCTURE: CPT | Performed by: NURSE PRACTITIONER

## 2018-04-16 PROCEDURE — 84443 ASSAY THYROID STIM HORMONE: CPT | Performed by: NURSE PRACTITIONER

## 2018-04-16 RX ORDER — LEVOTHYROXINE SODIUM 50 UG/1
50 TABLET ORAL DAILY
Qty: 90 TABLET | Refills: 0 | Status: SHIPPED | OUTPATIENT
Start: 2018-04-16 | End: 2018-09-18

## 2018-04-16 NOTE — NURSING NOTE
"Chief Complaint   Patient presents with     RECHECK       Initial /72  Pulse 72  Temp 98.1  F (36.7  C) (Oral)  Resp 16  Wt 145 lb 6.4 oz (66 kg)  LMP 02/22/2018  SpO2 100%  Breastfeeding? No  BMI 29.37 kg/m2 Estimated body mass index is 29.37 kg/(m^2) as calculated from the following:    Height as of 1/23/18: 4' 11\" (1.499 m).    Weight as of this encounter: 145 lb 6.4 oz (66 kg).  Medication Reconciliation: complete     Carmen Hicks MA  "

## 2018-04-16 NOTE — LETTER
April 17, 2018      Bridgett Hardin  [address] [address] [facility] [address]        Dear ,    We are writing to inform you of your test results.    Normal thyroid labs, follow up as needed.     Resulted Orders   TSH with free T4 reflex   Result Value Ref Range    TSH 3.16 0.40 - 4.00 mU/L       If you have any questions or concerns, please call the clinic at the number listed above.       Sincerely,    America Andre NP/isabel

## 2018-04-16 NOTE — MR AVS SNAPSHOT
After Visit Summary   4/16/2018    Bridgett Hardin    MRN: [id number]           Patient Information     Date Of Birth          1975        Visit Information        Provider Department      4/16/2018 11:00 AM America Andre NP [facility]        Today's Diagnoses     Pregnancy examination or test, positive result    -  1    Hypothyroidism, unspecified type          Care Instructions    Overview of thyroid disease in pregnancy from UTD given to pt. Keep OB/GYN appointment, discuss with them.  Schedule with endocrinology if you want to discuss further. . Follow up as needed.             Follow-ups after your visit        Additional Services     ENDOCRINOLOGY ADULT REFERRAL       Your provider has referred you to: [facility]: [facility] [phone]   [email or web address]      Please be aware that coverage of these services is subject to the terms and limitations of your health insurance plan.  Call member services at your health plan with any benefit or coverage questions.      Please bring the following to your appointment:    >>   Any x-rays, CTs or MRIs which have been performed.  Contact the facility where they were done to arrange for  prior to your scheduled appointment.    >>   List of current medications   >>   This referral request   >>   Any documents/labs given to you for this referral                  Follow-up notes from your care team     Return if symptoms worsen or fail to improve.      Your next 10 appointments already scheduled     Apr 27, 2018 10:00 AM CDT   New Prenatal with Cephas Mawuena Agbeh, MD   [facility] [place] ([facility])    [address] [facility]  [place] [address] [phone]              Who to contact     Normal or non-critical lab and imaging results will be communicated to you by MyChart, letter or phone within 4 business days after the clinic  has received the results. If you do not hear from us within 7 days, please contact the clinic through [employer] or phone. If you have a critical or abnormal lab result, we will notify you by phone as soon as possible.  Submit refill requests through [employer] or call your pharmacy and they will forward the refill request to us. Please allow 3 business days for your refill to be completed.          If you need to speak with a  for additional information , please call: [phone]             Additional Information About Your Visit        Care EveryWhere ID     This is your Care EveryWhere ID. This could be used by other organizations to access your [facility] medical records  [id number]        Your Vitals Were     Pulse Temperature Respirations Last Period Pulse Oximetry Breastfeeding?    72 98.1  F (36.7  C) (Oral) 16 02/22/2018 100% No    BMI (Body Mass Index)                   29.37 kg/m2            Blood Pressure from Last 3 Encounters:   04/16/18 104/72   03/26/18 121/84   01/23/18 126/84    Weight from Last 3 Encounters:   04/16/18 145 lb 6.4 oz (66 kg)   03/26/18 141 lb 6.4 oz (64.1 kg)   01/23/18 139 lb 3.2 oz (63.1 kg)              We Performed the Following     ENDOCRINOLOGY ADULT REFERRAL     TSH with free T4 reflex          Where to get your medicines      These medications were sent to [id number]  [place] - [address] AT [facility] [address]  [address], [place] [address]     Phone:  [phone]     levothyroxine 50 MCG tablet          Primary Care Provider Office Phone # Fax #    [facility] [phone] [address]  [place] [address]        Equal Access to Services     RICCI FIGUEROA AH: Hadii anu Cuevas, waawada serenity, qaybta kaalmada obi, suresh yang. So [facility] [phone].    ATENCIÓN: Si habla español, tiene a harrison disposición servicios gratuitos de  asistencia lingüística. Arianna al [phone].    We comply with applicable federal civil rights laws and [place] laws. We do not discriminate on the basis of race, color, national origin, age, disability, sex, sexual orientation, or gender identity.            Thank you!     Thank you for choosing [facility]  for your care. Our goal is always to provide you with excellent care. Hearing back from our patients is one way we can continue to improve our services. Please take a few minutes to complete the written survey that you may receive in the mail after your visit with us. Thank you!             Your Updated Medication List - Protect others around you: Learn how to safely use, store and throw away your medicines at www.disposemymeds.org.          This list is accurate as of 4/16/18 11:07 AM.  Always use your most recent med list.                   Brand Name Dispense Instructions for use Diagnosis    levothyroxine 50 MCG tablet    SYNTHROID/LEVOTHROID    90 tablet    Take 1 tablet (50 mcg) by mouth daily    Hypothyroidism, unspecified type       Prenatal Vitamins 28-0.8 MG Tabs     90 tablet    Take 1 tablet by mouth daily    Pregnancy test positive

## 2018-04-16 NOTE — PROGRESS NOTES
SUBJECTIVE:   Bridgett Hardin is a 42 year old female who presents to clinic today for the following health issues:      Hypothyroidism Follow-up      Since last visit, patient describes the following symptoms: Weight stable, no hair loss, no skin changes, no constipation, no loose stools  Currently pregnant- unknown gestation, has not bee    Amount of exercise or physical activity: 1 day/week for an average of varies    Problems taking medications regularly: No    Medication side effects: none    Diet: regular (no restrictions)      Problem list and histories reviewed & adjusted, as indicated.  Additional history: as documented    Patient Active Problem List   Diagnosis     Hypothyroidism, unspecified type     History reviewed. No pertinent surgical history.    Social History   Substance Use Topics     Smoking status: Never Smoker     Smokeless tobacco: Never Used     Alcohol use No     History reviewed. No pertinent family history.      Current Outpatient Prescriptions   Medication Sig Dispense Refill     levothyroxine (SYNTHROID/LEVOTHROID) 50 MCG tablet Take 1 tablet (50 mcg) by mouth daily 90 tablet 0     Prenatal Vit-Fe Fumarate-FA (PRENATAL VITAMINS) 28-0.8 MG TABS Take 1 tablet by mouth daily 90 tablet 3     [DISCONTINUED] levothyroxine (SYNTHROID/LEVOTHROID) 50 MCG tablet Take 1 tablet (50 mcg) by mouth daily 90 tablet 0     No Known Allergies  BP Readings from Last 3 Encounters:   04/16/18 104/72   03/26/18 121/84   01/23/18 126/84    Wt Readings from Last 3 Encounters:   04/16/18 145 lb 6.4 oz (66 kg)   03/26/18 141 lb 6.4 oz (64.1 kg)   01/23/18 139 lb 3.2 oz (63.1 kg)                  Labs reviewed in EPIC    Reviewed and updated as needed this visit by clinical staff  Tobacco  Allergies  Meds  Med Hx  Surg Hx  Fam Hx  Soc Hx      Reviewed and updated as needed this visit by Provider         ROS:  Constitutional, HEENT, cardiovascular, pulmonary, GI, , musculoskeletal, neuro, skin, endocrine and  psych systems are negative, except as otherwise noted.    OBJECTIVE:     /72  Pulse 72  Temp 98.1  F (36.7  C) (Oral)  Resp 16  Wt 145 lb 6.4 oz (66 kg)  LMP 02/22/2018  SpO2 100%  Breastfeeding? No  BMI 29.37 kg/m2  Body mass index is 29.37 kg/(m^2).  GENERAL: healthy, alert and no distress  NECK: no adenopathy, no asymmetry, masses, or scars and thyroid normal to palpation  RESP: lungs clear to auscultation - no rales, rhonchi or wheezes  CV: regular rate and rhythm, normal S1 S2, no S3 or S4, no murmur, click or rub, no peripheral edema and peripheral pulses strong  PSYCH: mentation appears normal, affect normal/bright    Diagnostic Test Results:  See orders    ASSESSMENT/PLAN:         ICD-10-CM    1. Hypothyroidism, unspecified type E03.9 levothyroxine (SYNTHROID/LEVOTHROID) 50 MCG tablet     ENDOCRINOLOGY ADULT REFERRAL     TSH with free T4 reflex   2. Pregnancy examination or test, positive result Z32.01 ENDOCRINOLOGY ADULT REFERRAL     TSH with free T4 reflex       See Patient Instructions: Overview of thyroid disease in pregnancy from UTD given to pt. Keep OB/GYN appointment, discuss with them.  Schedule with endocrinology if you want to discuss further. . Follow up as needed.     America Andre, CYNTHIA  [facility]

## 2018-04-16 NOTE — PATIENT INSTRUCTIONS
Overview of thyroid disease in pregnancy from UTD given to pt. Keep OB/GYN appointment, discuss with them.  Schedule with endocrinology if you want to discuss further. . Follow up as needed.

## 2018-04-17 NOTE — PROGRESS NOTES
Please send a letter to the patient with the results.  Normal thyroid labs, follow up as needed.     EDITH Frank

## 2018-04-26 NOTE — PATIENT INSTRUCTIONS
If you have any questions regarding your visit, Please contact your care team.    [facility]: [phone]      [facility] CLINIC HOURS TELEPHONE NUMBER   Cephas Agbeh, M.D.    ARI Muñoz,     AUDREY Cross, AUDREY             Monday-[place]    8:00a.m-4:45 p.m    Tuesday--[place]     8:00a.m-4:45 p.m.    Thursday-[place]    8:00a.m-4:45 p.m.    Friday-[place]    8:00a.m-4:45 p.m    [facility]   [address]   [place] [address] [phone]-Ask for [facility]   Fax [phone]   [phone]     [facility] Labor and Delivery   [address] [place] [address] Dr.   [place], [place] [address] [phone]     [facility]   [address] [facility] [address] [phone]   [phone]    Urgent Care locations:    [facility]  Monday-Friday   5 pm - 9 pm   Saturday and Sunday    9 am - 5 pm      Monday-Friday    11 pm - 9 pm  Saturday and Sunday   9 am - 5 pm    [phone]     If you need a medication refill, please contact your pharmacy. Please allow 3 business days for your refill to be completed.  As always, Thank you for trusting us with your healthcare needs!

## 2018-04-27 ENCOUNTER — PRENATAL OFFICE VISIT (OUTPATIENT)
Dept: OBGYN | Facility: CLINIC | Age: 43
End: 2018-04-27
Payer: COMMERCIAL

## 2018-04-27 VITALS
TEMPERATURE: 98 F | HEIGHT: 59 IN | OXYGEN SATURATION: 99 % | WEIGHT: 146 LBS | HEART RATE: 73 BPM | BODY MASS INDEX: 29.43 KG/M2 | SYSTOLIC BLOOD PRESSURE: 119 MMHG | DIASTOLIC BLOOD PRESSURE: 83 MMHG

## 2018-04-27 DIAGNOSIS — O09.529 SUPERVISION OF HIGH-RISK PREGNANCY OF ELDERLY MULTIGRAVIDA: Primary | ICD-10-CM

## 2018-04-27 DIAGNOSIS — E03.9 HYPOTHYROIDISM, UNSPECIFIED TYPE: ICD-10-CM

## 2018-04-27 LAB
BASOPHILS # BLD AUTO: 0 10E9/L (ref 0–0.2)
BASOPHILS NFR BLD AUTO: 0.1 %
DIFFERENTIAL METHOD BLD: NORMAL
EOSINOPHIL # BLD AUTO: 0.2 10E9/L (ref 0–0.7)
EOSINOPHIL NFR BLD AUTO: 2.1 %
ERYTHROCYTE [DISTWIDTH] IN BLOOD BY AUTOMATED COUNT: 13.2 % (ref 10–15)
HCT VFR BLD AUTO: 40.4 % (ref 35–47)
HGB BLD-MCNC: 13.6 G/DL (ref 11.7–15.7)
LYMPHOCYTES # BLD AUTO: 1.4 10E9/L (ref 0.8–5.3)
LYMPHOCYTES NFR BLD AUTO: 19 %
MCH RBC QN AUTO: 30 PG (ref 26.5–33)
MCHC RBC AUTO-ENTMCNC: 33.7 G/DL (ref 31.5–36.5)
MCV RBC AUTO: 89 FL (ref 78–100)
MONOCYTES # BLD AUTO: 0.5 10E9/L (ref 0–1.3)
MONOCYTES NFR BLD AUTO: 6.3 %
NEUTROPHILS # BLD AUTO: 5.3 10E9/L (ref 1.6–8.3)
NEUTROPHILS NFR BLD AUTO: 72.5 %
PLATELET # BLD AUTO: 224 10E9/L (ref 150–450)
RBC # BLD AUTO: 4.54 10E12/L (ref 3.8–5.2)
WBC # BLD AUTO: 7.3 10E9/L (ref 4–11)

## 2018-04-27 PROCEDURE — 86762 RUBELLA ANTIBODY: CPT | Performed by: OBSTETRICS & GYNECOLOGY

## 2018-04-27 PROCEDURE — 84144 ASSAY OF PROGESTERONE: CPT | Performed by: OBSTETRICS & GYNECOLOGY

## 2018-04-27 PROCEDURE — 84702 CHORIONIC GONADOTROPIN TEST: CPT | Performed by: OBSTETRICS & GYNECOLOGY

## 2018-04-27 PROCEDURE — 87086 URINE CULTURE/COLONY COUNT: CPT | Performed by: OBSTETRICS & GYNECOLOGY

## 2018-04-27 PROCEDURE — 99203 OFFICE O/P NEW LOW 30 MIN: CPT | Performed by: OBSTETRICS & GYNECOLOGY

## 2018-04-27 PROCEDURE — 36415 COLL VENOUS BLD VENIPUNCTURE: CPT | Performed by: OBSTETRICS & GYNECOLOGY

## 2018-04-27 PROCEDURE — 86901 BLOOD TYPING SEROLOGIC RH(D): CPT | Performed by: OBSTETRICS & GYNECOLOGY

## 2018-04-27 PROCEDURE — 87591 N.GONORRHOEAE DNA AMP PROB: CPT | Performed by: OBSTETRICS & GYNECOLOGY

## 2018-04-27 PROCEDURE — 86900 BLOOD TYPING SEROLOGIC ABO: CPT | Performed by: OBSTETRICS & GYNECOLOGY

## 2018-04-27 PROCEDURE — 87341 HEP B SURFACE AG NEUTRLZJ IA: CPT | Performed by: OBSTETRICS & GYNECOLOGY

## 2018-04-27 PROCEDURE — 87389 HIV-1 AG W/HIV-1&-2 AB AG IA: CPT | Performed by: OBSTETRICS & GYNECOLOGY

## 2018-04-27 PROCEDURE — 86780 TREPONEMA PALLIDUM: CPT | Performed by: OBSTETRICS & GYNECOLOGY

## 2018-04-27 PROCEDURE — 86850 RBC ANTIBODY SCREEN: CPT | Performed by: OBSTETRICS & GYNECOLOGY

## 2018-04-27 PROCEDURE — 87340 HEPATITIS B SURFACE AG IA: CPT | Performed by: OBSTETRICS & GYNECOLOGY

## 2018-04-27 PROCEDURE — 87491 CHLMYD TRACH DNA AMP PROBE: CPT | Performed by: OBSTETRICS & GYNECOLOGY

## 2018-04-27 PROCEDURE — 85025 COMPLETE CBC W/AUTO DIFF WBC: CPT | Performed by: OBSTETRICS & GYNECOLOGY

## 2018-04-27 NOTE — PROGRESS NOTES
"Bridgett is a 42 year old( chrological 37 years )  @  9w1d weeks here for new ob visit.      See Ob questionnaire for pertinent components of HPI.  Current Issues include: nausea, mild    Obstetric History       T3      L0     SAB0   TAB0   Ectopic0   Multiple0   Live Births0       # Outcome Date GA Lbr Ivan/2nd Weight Sex Delivery Anes PTL Lv   4 Current            3 Term 10/21/14   6 lb 10 oz (3.005 kg) F       2 Term 13   8 lb 9 oz (3.884 kg) M   N    1 Term 07   7 lb 10 oz (3.459 kg) M              Gyne: Pap smears Normal  History reviewed. No pertinent past medical history.  History reviewed. No pertinent surgical history.  Patient Active Problem List    Diagnosis Date Noted     Hypothyroidism, unspecified type 2018     Priority: Medium      No Known Allergies  Current Outpatient Prescriptions   Medication Sig Dispense Refill     levothyroxine (SYNTHROID/LEVOTHROID) 50 MCG tablet Take 1 tablet (50 mcg) by mouth daily 90 tablet 0     Prenatal Vit-Fe Fumarate-FA (PRENATAL VITAMINS) 28-0.8 MG TABS Take 1 tablet by mouth daily 90 tablet 3       Past Medical History of Father of Baby: New Father . 50 yrs old.  No significant medical history    Physical Exam: /83  Pulse 73  Temp 98  F (36.7  C) (Tympanic)  Ht 4' 11.25\" (1.505 m)  Wt 146 lb (66.2 kg)  LMP 2018  SpO2 99%  BMI 29.24 kg/m2  General: Well developed, well nourished female  Skin: Normal  HEENT: Normal  Neck: Supple,no adenopathy,thyroid normal  Chest: Clear  Heart: Regular rate, rhythm,No murmur, rub, gallop  Breasts: No masses, skin, nipple or axillary changes   Abdomen: Benign,Soft, flat, non-tender,No masses, organomegaly,No inguinal nodes,Bowel sounds normoactive   Extremities: Normal  Neurological: Normal   Perineum: Intact   Vulva: Normal  Vagina: Normal mucosa, no discharge  Cervix: Parous, closed, mobile, no discharge  Uterus: 10 weeks, Normal shape, position and consistency   Adnexa: " Normal  Rectum: deferred, Normal without lesion or mass   Bony Pelvis: Adequate       A/P 42 year old  at  9w1d weeks    - Discussed physician coverage, tertiary support, diet, exercise, weight gain, schedule of visits, routine and indicated ultrasounds, and childbirth education.    - Options for  testing for chromosomal and birth defects were discussed with the patient.  Diagnostic tests include CVS and Amniocentesis.  We discussed that these tests are definitive but invasive and do carry a risk of fetal loss.    Screening tests include nuchal translucency/blood marker testing in the first trimester and quad screening in the second trimester.  We discussed that these are screening tests and not diagnostic tests and that false positives and negatives are a distinct possibility.  The patient declines all testing. May consider M Level 2 U/S.      return to clinic in 4 weeks.    CEPHAS AGBEH, MD.

## 2018-04-27 NOTE — NURSING NOTE
"Chief Complaint   Patient presents with     Prenatal Care     9w1d New Prenatal LMP 2/22/18       Initial /83  Pulse 73  Temp 98  F (36.7  C) (Tympanic)  Ht 4' 11.25\" (1.505 m)  Wt 146 lb (66.2 kg)  LMP 02/22/2018  SpO2 99%  BMI 29.24 kg/m2 Estimated body mass index is 29.24 kg/(m^2) as calculated from the following:    Height as of this encounter: 4' 11.25\" (1.505 m).    Weight as of this encounter: 146 lb (66.2 kg).  Medication Reconciliation: complete       Wanda Thorne CMA      "

## 2018-04-27 NOTE — MR AVS SNAPSHOT
After Visit Summary   4/27/2018    Bridgett Hardin    MRN: [id number]           Patient Information     Date Of Birth          1975        Visit Information        Provider Department      4/27/2018 10:00 AM Agbeh, Cephas Mawuena, MD [facility]        Today's Diagnoses     Visit for confirmation of pregnancy test result with physical exam    -  1      Care Instructions                                                        If you have any questions regarding your visit, Please contact your care team.    [facility] Access Services: [phone]      Women s Health CLINIC HOURS TELEPHONE NUMBER   Cephas Agbeh, M.D.    ARI Muñoz,     AUDREY Cross, RN             Monday-[place]    8:00a.m-4:45 p.m    Tuesday--[place]     8:00a.m-4:45 p.m.    Thursday-[place]    8:00a.m-4:45 p.m.    Friday-[place]    8:00a.m-4:45 p.m    [facility]   [address] [place] [address]   [phone]-[facility]   Fax [phone]   Gqtpkca-[phone]     [facility] Labor and Delivery   [address] [place] [address] [place] [address]   [phone]     [facility]   [address]   [place] [address]   [phone]   Eepmreg-[phone]    Urgent Care locations:    [facility]  Monday-Friday   5 pm - 9 pm   Saturday and Sunday    9 am - 5 pm      Monday-Friday    11 pm - 9 pm  Saturday and Sunday   9 am - 5 pm    [phone]     If you need a medication refill, please contact your pharmacy. Please allow 3 business days for your refill to be completed.  As always, Thank you for trusting us with your healthcare needs!            Follow-ups after your visit        Your next 10 appointments already scheduled     Apr 28, 2018  9:30 AM CDT   US OB < 14 WEEKS SINGLE with BEUS1   [facility] ([facility]    [address] [facility] [id number] [phone]            Please bring a list of your medicines (including vitamins, minerals and over-the-counter drugs). Also, tell your doctor about any allergies you may have. Wear comfortable clothes and leave your valuables at home.  If you re less than 20 weeks drink four 8-ounce glasses of fluid an hour before your exam. If you need to empty your bladder before your exam, try to release only a little urine. Then, drink another glass of fluid.  You may have up to two family members in the exam room. If you bring a small child, an adult must be there to care for him or her.  Please call the Imaging Department at your exam site with any questions.            May 24, 2018  2:15 PM CDT   ESTABLISHED PRENATAL with Cephas Mawuena Agbeh, MD   [facility] [place] ([facility])    [address]  [place] [id number] [phone]              Future tests that were ordered for you today     Open Future Orders        Priority Expected Expires Ordered    US OB < 14 Weeks Single Routine 4/27/2018 6/11/2018 4/27/2018            Who to contact     If you have questions or need follow up information about today's clinic visit or your schedule please contact [facility] directly at [phone].  Normal or non-critical lab and imaging results will be communicated to you by Marfeelhart, letter or phone within 4 business days after the clinic has received the results. If you do not hear from us within 7 days, please contact the clinic through Marfeel[employer] or phone. If you have a critical or abnormal lab result, we will notify you by phone as soon as possible.  Submit refill requests through [employer] or call your pharmacy and they will forward the refill request to us. Please allow 3 business days for your refill to be completed.          Additional Information About Your Visit        [employer] Information     [employer] lets you send messages to your doctor, view your test results, renew your prescriptions, schedule  "appointments and more. To sign up, go to [email or web address] . Click on \"Log in\" on the left side of the screen, which will take you to the Welcome page. Then click on \"Sign up Now\" on the right side of the page.     You will be asked to enter the access code listed below, as well as some personal information. Please follow the directions to create your username and password.     Your access code is: [email or web address]  Expires: 2018 10:15 AM     Your access code will  in 90 days. If you need help or a new code, please call your [place] clinic or [phone].        Care EveryWhere ID     This is your Care EveryWhere ID. This could be used by other organizations to access your [place] medical records  [id number]        Your Vitals Were     Pulse Temperature Height Last Period Pulse Oximetry BMI (Body Mass Index)    73 98  F (36.7  C) (Tympanic) 4' 11.25\" (1.505 m) 2018 99% 29.24 kg/m2       Blood Pressure from Last 3 Encounters:   18 119/83   18 104/72   18 121/84    Weight from Last 3 Encounters:   18 146 lb (66.2 kg)   18 145 lb 6.4 oz (66 kg)   18 141 lb 6.4 oz (64.1 kg)              We Performed the Following     ABO/Rh type and screen     Anti Treponema     CBC with platelets differential     Chlamydia trachomatis PCR     Hepatitis B surface antigen     HIV Antigen Antibody Combo     Neisseria gonorrhoeae PCR     Rubella Antibody IgG Quantitative     Urine Culture Aerobic Bacterial        Primary Care Provider Office Phone # Fax #    America PatelNATAN de la torre [phone]       [address]   [place] [address]        Equal Access to Services     [facility] Hospital[facility]ELISSA : Keyanna Cuevas, waawada luqadaha, qaybta kaalmada obi, suresh yang. [facility] [phone].    ATENCIÓN: Si habla español, tiene a harrison disposición servicios gratuitos de asistencia lingüística. Llame al [phone].    We comply with " applicable federal civil rights laws and [place] laws. We do not discriminate on the basis of race, color, national origin, age, disability, sex, sexual orientation, or gender identity.            Thank you!     Thank you for choosing [facility]  for your care. Our goal is always to provide you with excellent care. Hearing back from our patients is one way we can continue to improve our services. Please take a few minutes to complete the written survey that you may receive in the mail after your visit with us. Thank you!             Your Updated Medication List - Protect others around you: Learn how to safely use, store and throw away your medicines at www.disposemymeds.org.          This list is accurate as of 4/27/18 10:20 AM.  Always use your most recent med list.                   Brand Name Dispense Instructions for use Diagnosis    levothyroxine 50 MCG tablet    SYNTHROID/LEVOTHROID    90 tablet    Take 1 tablet (50 mcg) by mouth daily    Hypothyroidism, unspecified type       Prenatal Vitamins 28-0.8 MG Tabs     90 tablet    Take 1 tablet by mouth daily    Pregnancy test positive

## 2018-04-28 ENCOUNTER — RADIANT APPOINTMENT (OUTPATIENT)
Dept: ULTRASOUND IMAGING | Facility: CLINIC | Age: 43
End: 2018-04-28
Attending: OBSTETRICS & GYNECOLOGY
Payer: COMMERCIAL

## 2018-04-28 DIAGNOSIS — O09.529 SUPERVISION OF HIGH-RISK PREGNANCY OF ELDERLY MULTIGRAVIDA: ICD-10-CM

## 2018-04-28 DIAGNOSIS — E03.9 HYPOTHYROIDISM, UNSPECIFIED TYPE: ICD-10-CM

## 2018-04-28 LAB
BACTERIA SPEC CULT: NO GROWTH
SPECIMEN SOURCE: NORMAL
T PALLIDUM IGG+IGM SER QL: NEGATIVE

## 2018-04-28 PROCEDURE — 76801 OB US < 14 WKS SINGLE FETUS: CPT

## 2018-04-29 LAB
C TRACH DNA SPEC QL NAA+PROBE: NEGATIVE
N GONORRHOEA DNA SPEC QL NAA+PROBE: NEGATIVE
SPECIMEN SOURCE: NORMAL
SPECIMEN SOURCE: NORMAL

## 2018-04-30 DIAGNOSIS — E03.9 HYPOTHYROIDISM, UNSPECIFIED TYPE: ICD-10-CM

## 2018-04-30 DIAGNOSIS — O09.529 SUPERVISION OF HIGH-RISK PREGNANCY OF ELDERLY MULTIGRAVIDA: Primary | ICD-10-CM

## 2018-04-30 LAB
ABO + RH BLD: NORMAL
ABO + RH BLD: NORMAL
B-HCG SERPL-ACNC: ABNORMAL IU/L (ref 0–5)
B-HCG SERPL-ACNC: ABNORMAL IU/L (ref 0–5)
BLD GP AB SCN SERPL QL: NORMAL
BLOOD BANK CMNT PATIENT-IMP: NORMAL
HBV SURFACE AG SERPL QL IA: REACTIVE
HIV 1+2 AB+HIV1 P24 AG SERPL QL IA: NONREACTIVE
PROGEST SERPL-MCNC: 7.2 NG/ML
PROGEST SERPL-MCNC: 9.8 NG/ML
RUBV IGG SERPL IA-ACNC: 27 IU/ML
SPECIMEN EXP DATE BLD: NORMAL

## 2018-04-30 PROCEDURE — 84144 ASSAY OF PROGESTERONE: CPT | Performed by: OBSTETRICS & GYNECOLOGY

## 2018-04-30 PROCEDURE — 84702 CHORIONIC GONADOTROPIN TEST: CPT | Performed by: OBSTETRICS & GYNECOLOGY

## 2018-04-30 PROCEDURE — 36415 COLL VENOUS BLD VENIPUNCTURE: CPT | Performed by: OBSTETRICS & GYNECOLOGY

## 2018-05-02 ENCOUNTER — TELEPHONE (OUTPATIENT)
Dept: OBGYN | Facility: CLINIC | Age: 43
End: 2018-05-02

## 2018-05-02 NOTE — TELEPHONE ENCOUNTER
Jose states he would like more information on hepatitis B and pregnancy for this patient.  He is also faxing out a form he would like you or your nurse to complete.    Thank you.

## 2018-05-03 ENCOUNTER — TELEPHONE (OUTPATIENT)
Dept: OBGYN | Facility: CLINIC | Age: 43
End: 2018-05-03

## 2018-05-03 NOTE — TELEPHONE ENCOUNTER
Return call to patient. Attempted to determine what patient is requesting. Patient stated she can feel baby move inside her and she wants further testing an US. Unsure if patient is wanting referral to [facility]. After reviewing chart, learned patient does not have a viable pregnancy. Noted patient has an appt on 05-08-18 for f/u. Explained Dr. Agbeh will order labs at appt. Patient agreed to discuss further at appt. Patient denied any bleeding or abdominal pain. America Kaur RN, BAN

## 2018-05-03 NOTE — TELEPHONE ENCOUNTER
[facility] Call Center    Phone Message    May a detailed message be left on voicemail: no    Reason for Call: Other: opt is requesting a call back regarding ordering blood test. Please give pt a call back to discuss     Action Taken: Message routed to:  [facility] p [phone]

## 2018-05-03 NOTE — TELEPHONE ENCOUNTER
Return call to Jose at [facility]. Explained it appears to patient has fetal demise per US and decreasing HCG. Explained will send Prenatal Hepatitis B Pregnancy Report today. Explained next office visit with Dr. Agbeh is on 05-08-18. Jose agreed to receive report now. Faxed form, labs & US to [facility]. America Kaur RN, BAN

## 2018-05-07 NOTE — PATIENT INSTRUCTIONS
If you have any questions regarding your visit, Please contact your care team.    [facility] Access Services: [phone]      [facility] CLINIC HOURS TELEPHONE NUMBER   Cephas Agbeh, M.D.    ARI Muñoz,     AUDREY Cross, AUDREY             Monday-[place]    8:00a.m-4:45 p.m    Tuesday--[place]     8:00a.m-4:45 p.m.    Thursday-[place]    8:00a.m-4:45 p.m.    Friday-[place]    8:00a.m-4:45 p.m    [facility]   [address]   [place] [address] [phone]-Ask for [facility]   Fax [phone]   Gzygctz-[phone]     [facility] Labor and Delivery   [address] [place] [address] Dr.   [place], [place] [address] [phone]     [facility]   [address] [address] [phone]   Sbgjezr-[phone]    Urgent Care locations:    [facility]  Monday-Friday   5 pm - 9 pm   Saturday and Sunday    9 am - 5 pm      Monday-Friday    11 pm - 9 pm  Saturday and Sunday   9 am - 5 pm    [phone]     If you need a medication refill, please contact your pharmacy. Please allow 3 business days for your refill to be completed.  As always, Thank you for trusting us with your healthcare needs!

## 2018-05-08 ENCOUNTER — OFFICE VISIT (OUTPATIENT)
Dept: OBGYN | Facility: CLINIC | Age: 43
End: 2018-05-08
Payer: COMMERCIAL

## 2018-05-08 ENCOUNTER — RADIANT APPOINTMENT (OUTPATIENT)
Dept: ULTRASOUND IMAGING | Facility: CLINIC | Age: 43
End: 2018-05-08
Attending: OBSTETRICS & GYNECOLOGY
Payer: COMMERCIAL

## 2018-05-08 ENCOUNTER — TELEPHONE (OUTPATIENT)
Dept: OBGYN | Facility: CLINIC | Age: 43
End: 2018-05-08

## 2018-05-08 VITALS
DIASTOLIC BLOOD PRESSURE: 72 MMHG | SYSTOLIC BLOOD PRESSURE: 124 MMHG | WEIGHT: 147.2 LBS | TEMPERATURE: 98.6 F | BODY MASS INDEX: 29.48 KG/M2 | HEART RATE: 74 BPM

## 2018-05-08 DIAGNOSIS — O20.0 THREATENED ABORTION: ICD-10-CM

## 2018-05-08 DIAGNOSIS — O02.1 MISSED AB: ICD-10-CM

## 2018-05-08 DIAGNOSIS — O20.0 THREATENED ABORTION: Primary | ICD-10-CM

## 2018-05-08 LAB — B-HCG SERPL-ACNC: 5739 IU/L (ref 0–5)

## 2018-05-08 PROCEDURE — 76801 OB US < 14 WKS SINGLE FETUS: CPT | Performed by: RADIOLOGY

## 2018-05-08 PROCEDURE — 36415 COLL VENOUS BLD VENIPUNCTURE: CPT | Performed by: OBSTETRICS & GYNECOLOGY

## 2018-05-08 PROCEDURE — 76817 TRANSVAGINAL US OBSTETRIC: CPT | Performed by: RADIOLOGY

## 2018-05-08 PROCEDURE — 84702 CHORIONIC GONADOTROPIN TEST: CPT | Performed by: OBSTETRICS & GYNECOLOGY

## 2018-05-08 PROCEDURE — 99214 OFFICE O/P EST MOD 30 MIN: CPT | Performed by: OBSTETRICS & GYNECOLOGY

## 2018-05-08 RX ORDER — MISOPROSTOL 200 UG/1
400 TABLET ORAL 2 TIMES DAILY
Qty: 2 TABLET | Refills: 0 | Status: SHIPPED | OUTPATIENT
Start: 2018-05-08 | End: 2018-05-11

## 2018-05-08 NOTE — PROGRESS NOTES
Bridgett is a 42 year old  referred here by self for consultation regarding test results and U/S showing possible fetal demise.  Denies any bleeding or cramps.She is here with spouse.  US OB < 14 WEEKS SINGLE  2018 10:02 AM     HISTORY: Supervision of high-risk pregnancy of elderly multigravida.  Hypothyroidism, unspecified type.     TECHNIQUE: Transabdominal  and transvaginal imaging were performed.   Transvaginal imaging was performed to better evaluate the uterus and  gestational sac.     COMPARISON:  None.     FINDINGS:       Estimated gestational age by current ultrasound measurement: 6 weeks 2  days.     Embryonic cardiac activity: No fetal cardiac activity is identified,  which is typically seen in a normal gestation with the current mean  sac diameter (21 mm).     Subchorionic hemorrhage: None.     Right ovary: 1.5 cm complex lesion, which could represent a corpus  luteum.  Left ovary: 2.1 cm complex lesion, which could represent a corpus  luteum. Doppler waveform analysis shows blood flow within both  ovaries.  Adnexal mass: None.  Free pelvic fluid: None.         IMPRESSION: Findings concerning for intrauterine fetal demise. If  indicated clinically, confirmation with serial hCG levels would be  recommended.     EMILEE TROTTER MD    Results for orders placed or performed in visit on 18   HCG Quantitative Pregnancy, Blood (MWT066)   Result Value Ref Range    HCG Quantitative Serum 32107 (H) 0 - 5 IU/L   Progesterone   Result Value Ref Range    Progesterone 7.2 ng/mL     Exam Date Exam Time Accession # Results    18 10:47 AM [id number]    Component Results   Component Value Flag Ref Range Units Status Collected Lab   HCG Quantitative Serum 36370 (H) 0 - 5 IU/L Final 2018 10:47 AM 51     Exam Date Exam Time Accession # Results    18 10:47 AM [id number]    Component Results   Component Value Flag Ref Range Units Status Collected Lab   Progesterone 9.8   ng/mL Final  2018 10:47 AM 51   Comment:   Progesterone Reference Range   Female   Nonpregnant           Follicular      0.15-1.4           Luteal          3.4-25.6           Postmenopausal  <0.15-0.73   Pregnant           1st Trimester   11.2-90.0           2nd Trimester   25.6-89.4           3rd Trimester   48.4-422.5             ROS: Ten point review of systems was reviewed and negative except the above.    Gyne: - abn pap (last pap ), - STD's    History reviewed. No pertinent past medical history.  History reviewed. No pertinent surgical history.  Patient Active Problem List   Diagnosis     Hypothyroidism, unspecified type     Supervision of high-risk pregnancy of elderly multigravida       ALL/Meds: Her medication and allergy histories were reviewed and are documented in their appropriate chart areas.    SH: - tob, - EtOH,     FH: Her family history was reviewed and documented in its appropriate chart area.    PE: /72  Pulse 74  Temp 98.6  F (37  C) (Tympanic)  Wt 147 lb 3.2 oz (66.8 kg)  LMP 2018  BMI 29.48 kg/m2  Body mass index is 29.48 kg/(m^2).      General:  WNWD female, NAD  Alert  Oriented x 3  Gait:  Normal  Skin:  Normal skin turgor  HEENT:  NC/AT, EOMI  Abdomen:  Non-tender, non-distended.  Pelvic exam:  Not performed  Extremities:  No clubbing, no cyanosis and no edema.      A/P    ICD-10-CM    1. Threatened  O20.0 US OB < 14 Weeks Single     HCG quantitative pregnancy     Reviewed that miscarriage occurs ~ 1 in 5 pregnancy events and that there was no direct event or prevention  that the patient could have avoided or performed.  Discussed that there are many etiologies for miscarriage, the most common being a genetic anomaly.  Reviewed that risk of miscarriage for next pregnancy is not elevated by the current event.    Reviewed options of expectant management, D&C, and medical therapy (cytotec).  Reviewed risks and benefits of all options.  All questions answered.  Patient  is opting for a repeat ultrasound and hcg. Will manage expectantly at this time.  ACOG pamphlet is provided on the above options.  25 minutes was spent face to face with the patient today discussing her history, diagnosis, and follow-up plan as noted above.  Over 50% of the visit was spent in counseling and coordination of care.    Total Visit Time: 30 minutes.      CEPHAS AGBEH, MD.     Results for orders placed or performed in visit on 18   HCG quantitative pregnancy   Result Value Ref Range    HCG Quantitative Serum 5739 (H) 0 - 5 IU/L       I called the patient with her falling hcg level.   She will still proceed with the ultrasound today.  If still FHR then she wants to proceed with cytotec. 400 ug BID   PRESCRIPTION sent to her pharmacy.      ICD-10-CM    1. Threatened  O20.0 US OB < 14 Weeks Single     HCG quantitative pregnancy   2. Missed ab O02.1 misoprostol (CYTOTEC) 200 MCG tablet     CEPHAS AGBEH, MD.

## 2018-05-08 NOTE — MR AVS SNAPSHOT
After Visit Summary   2018    Bridgett Hardin    MRN: [id number]           Patient Information     Date Of Birth          1975        Visit Information        Provider Department      2018 3:00 PM Agbeh, Cephas Mawuena, MD [facility]        Today's Diagnoses     Threatened     -  1      Care Instructions                                                        If you have any questions regarding your visit, Please contact your care team.    [facility] Access Services: [phone]      [facility] CLINIC HOURS TELEPHONE NUMBER   Cephas Agbeh, M.D.    ARI Muñoz,     AUDREY Cross RN             Monday-[place]    8:00a.m-4:45 p.m    Tuesday--[place]     8:00a.m-4:45 p.m.    Thursday-[place]    8:00a.m-4:45 p.m.    Friday-[place]    8:00a.m-4:45 p.m    [address] [place] [address]   [phone]-Ask for [facility]   Fax [phone]   Mewyrof-[phone]     [facility] Labor and Delivery   [address] [place] [address] [place] [address]   [phone]     [facility]   [address] [facility] [address]   [phone]   Bjmnykc-[phone]    Urgent Care locations:    [facility]  Monday-Friday   5 pm - 9 pm   Saturday and     9 am - 5 pm      Monday-Friday    11 pm - 9 pm  Saturday and    9 am - 5 pm    [phone]     If you need a medication refill, please contact your pharmacy. Please allow 3 business days for your refill to be completed.  As always, Thank you for trusting us with your healthcare needs!            Follow-ups after your visit        Your next 10 appointments already scheduled     May 08, 2018  5:20 PM CDT   US OB < 14 WEEKS SINGLE with MGUS1, MG [facility] ([facility])    [address] [place] [id number] [phone]           Please bring a  list of your medicines (including vitamins, minerals and over-the-counter drugs). Also, tell your doctor about any allergies you may have. Wear comfortable clothes and leave your valuables at home.  If you re less than 20 weeks drink four 8-ounce glasses of fluid an hour before your exam. If you need to empty your bladder before your exam, try to release only a little urine. Then, drink another glass of fluid.  You may have up to two family members in the exam room. If you bring a small child, an adult must be there to care for him or her.  Please call the Imaging Department at your exam site with any questions.            May 24, 2018  2:15 PM CDT   ESTABLISHED PRENATAL with Cephas Mawuena Agbeh, MD   [facility] ([facility])    [address] [facility] [address] [phone]              Future tests that were ordered for you today     Open Future Orders        Priority Expected Expires Ordered    US OB < 14 Weeks Single Routine  5/8/2019 5/8/2018            Who to contact     If you have questions or need follow up information about today's clinic visit or your schedule please contact [facility] directly at [phone].  Normal or non-critical lab and imaging results will be communicated to you by MyChart, letter or phone within 4 business days after the clinic has received the results. If you do not hear from us within 7 days, please contact the clinic through "[employer]"hart or phone. If you have a critical or abnormal lab result, we will notify you by phone as soon as possible.  Submit refill requests through [employer] or call your pharmacy and they will forward the refill request to us. Please allow 3 business days for your refill to be completed.          Additional Information About Your Visit        [employer] Information     [employer] lets you send messages to your doctor, view your test results, renew your prescriptions, schedule appointments and more. To sign  "up, go to [email or web address] . Click on \"Log in\" on the left side of the screen, which will take you to the Welcome page. Then click on \"Sign up Now\" on the right side of the page.     You will be asked to enter the access code listed below, as well as some personal information. Please follow the directions to create your username and password.     Your access code is: [email or web address]  Expires: 2018 10:15 AM     Your access code will  in 90 days. If you need help or a new code, please call your [place] clinic or [phone].        Care EveryWhere ID     This is your Care EveryWhere ID. This could be used by other organizations to access your [place] medical records  [id number]        Your Vitals Were     Pulse Temperature Last Period BMI (Body Mass Index)          74 98.6  F (37  C) (Tympanic) 2018 29.48 kg/m2         Blood Pressure from Last 3 Encounters:   18 124/72   18 119/83   18 104/72    Weight from Last 3 Encounters:   18 147 lb 3.2 oz (66.8 kg)   18 146 lb (66.2 kg)   18 145 lb 6.4 oz (66 kg)              We Performed the Following     HCG quantitative pregnancy        Primary Care Provider Office Phone # Fax #    America Andre, NATAN [phone]       [address] [facility] [address]        Equal Access to Services     [facility] HospitalMELISSA : Hadii aad ku hadasho Soomaali, waaxda luqadaha, qaybta kaalmada adeegyada, suresh hayward . So [facility] [phone].    ATENCIÓN: Si habla español, tiene a harrison disposición servicios gratuitos de asistencia lingüística. Llame al [phone].    We comply with applicable federal civil rights laws and [place] laws. We do not discriminate on the basis of race, color, national origin, age, disability, sex, sexual orientation, or gender identity.            Thank you!     Thank you for choosing [facility]  for your care. Our goal is always to provide you with " excellent care. Hearing back from our patients is one way we can continue to improve our services. Please take a few minutes to complete the written survey that you may receive in the mail after your visit with us. Thank you!             Your Updated Medication List - Protect others around you: Learn how to safely use, store and throw away your medicines at www.disposemymeds.org.          This list is accurate as of 5/8/18  3:18 PM.  Always use your most recent med list.                   Brand Name Dispense Instructions for use Diagnosis    levothyroxine 50 MCG tablet    SYNTHROID/LEVOTHROID    90 tablet    Take 1 tablet (50 mcg) by mouth daily    Hypothyroidism, unspecified type       Prenatal Vitamins 28-0.8 MG Tabs     90 tablet    Take 1 tablet by mouth daily    Pregnancy test positive

## 2018-05-08 NOTE — TELEPHONE ENCOUNTER
Form for  Hepatitis B pregnancy report completed and faxed MD at [phone]. Form placed in scanning.      Wanda Thorne,CMA

## 2018-05-09 ENCOUNTER — ANESTHESIA EVENT (OUTPATIENT)
Dept: SURGERY | Facility: AMBULATORY SURGERY CENTER | Age: 43
End: 2018-05-09

## 2018-05-09 ENCOUNTER — TELEPHONE (OUTPATIENT)
Dept: OBGYN | Facility: CLINIC | Age: 43
End: 2018-05-09

## 2018-05-09 ENCOUNTER — OFFICE VISIT (OUTPATIENT)
Dept: OBGYN | Facility: CLINIC | Age: 43
End: 2018-05-09
Payer: COMMERCIAL

## 2018-05-09 VITALS
SYSTOLIC BLOOD PRESSURE: 100 MMHG | TEMPERATURE: 98.1 F | DIASTOLIC BLOOD PRESSURE: 71 MMHG | WEIGHT: 146.7 LBS | OXYGEN SATURATION: 98 % | BODY MASS INDEX: 29.38 KG/M2 | HEART RATE: 67 BPM

## 2018-05-09 DIAGNOSIS — O02.1 MISSED ABORTION: Primary | ICD-10-CM

## 2018-05-09 LAB
ABO + RH BLD: NORMAL
ABO + RH BLD: NORMAL
BLD GP AB SCN SERPL QL: NORMAL
BLOOD BANK CMNT PATIENT-IMP: NORMAL
ERYTHROCYTE [DISTWIDTH] IN BLOOD BY AUTOMATED COUNT: 13 % (ref 10–15)
HCT VFR BLD AUTO: 40.5 % (ref 35–47)
HGB BLD-MCNC: 13.5 G/DL (ref 11.7–15.7)
MCH RBC QN AUTO: 29.9 PG (ref 26.5–33)
MCHC RBC AUTO-ENTMCNC: 33.3 G/DL (ref 31.5–36.5)
MCV RBC AUTO: 90 FL (ref 78–100)
PLATELET # BLD AUTO: 231 10E9/L (ref 150–450)
RBC # BLD AUTO: 4.52 10E12/L (ref 3.8–5.2)
SPECIMEN EXP DATE BLD: NORMAL
WBC # BLD AUTO: 7.9 10E9/L (ref 4–11)

## 2018-05-09 PROCEDURE — 86901 BLOOD TYPING SEROLOGIC RH(D): CPT | Performed by: OBSTETRICS & GYNECOLOGY

## 2018-05-09 PROCEDURE — 85027 COMPLETE CBC AUTOMATED: CPT | Performed by: OBSTETRICS & GYNECOLOGY

## 2018-05-09 PROCEDURE — 36415 COLL VENOUS BLD VENIPUNCTURE: CPT | Performed by: OBSTETRICS & GYNECOLOGY

## 2018-05-09 PROCEDURE — 86850 RBC ANTIBODY SCREEN: CPT | Performed by: OBSTETRICS & GYNECOLOGY

## 2018-05-09 PROCEDURE — 86900 BLOOD TYPING SEROLOGIC ABO: CPT | Performed by: OBSTETRICS & GYNECOLOGY

## 2018-05-09 PROCEDURE — 99214 OFFICE O/P EST MOD 30 MIN: CPT | Performed by: OBSTETRICS & GYNECOLOGY

## 2018-05-09 NOTE — MR AVS SNAPSHOT
After Visit Summary   2018    Bridgett Hardin    MRN: [id number]           Patient Information     Date Of Birth          1975        Visit Information        Provider Department      2018 1:00 PM Edmond Garvin MD [facility]        Today's Diagnoses     Missed     -  1      Care Instructions                                                         If you have any questions regarding your visit, Please contact your care team.    Women s Health CLINIC HOURS TELEPHONE NUMBER   MD Brittany Durbin CMA Lisa -    AUDREY Cross       Monday:       7:30-4:30 [place]  Wednesday:       7:30-4:30 [place]  Thursday:       7:30-1:30 [place]  Friday:       7:30-11:30 [facility]  [address] [place], [place]  [id number] [phone] ask for Women's Clinic    [facility]  [address]  [place] [address] [phone] ask for Women's [facility]    Imaging Scheduling for [place]:  [phone]    Imaging Scheduling for [place]: [phone]       Urgent Care locations:    [facility] Saturday and    9 am - 5 pm    Monday-Friday   12 pm - 8 pm  Saturday and    9 am - 5 pm   [phone]     [facility] Labor and Delivery:  [phone]    If you need a medication refill, please contact your pharmacy. Please allow 3 business days for your refill to be completed.  As always, Thank you for trusting us with your healthcare needs!              Follow-ups after your visit        Your next 10 appointments already scheduled     May 11, 2018   Procedure with Edmond Garvni MD   [facility] (--)    [address]  [facility] [address] [phone]            May 24, 2018  2:15 PM CDT   ESTABLISHED PRENATAL with Cephas Mawuena Agbeh, MD   [facility] ([facility])    [address] [facility]  "[id number] [phone]              Who to contact     If you have questions or need follow up information about today's clinic visit or your schedule please contact [facility] directly at [phone].  Normal or non-critical lab and imaging results will be communicated to you by MyChart, letter or phone within 4 business days after the clinic has received the results. If you do not hear from us within 7 days, please contact the clinic through doxIQhart or phone. If you have a critical or abnormal lab result, we will notify you by phone as soon as possible.  Submit refill requests through [email or web address] or call your pharmacy and they will forward the refill request to us. Please allow 3 business days for your refill to be completed.          Additional Information About Your Visit        [email or web address] Information     [email or web address] lets you send messages to your doctor, view your test results, renew your prescriptions, schedule appointments and more. To sign up, go to [email or web address] . Click on \"Log in\" on the left side of the screen, which will take you to the Welcome page. Then click on \"Sign up Now\" on the right side of the page.     You will be asked to enter the access code listed below, as well as some personal information. Please follow the directions to create your username and password.     Your access code is: [email or web address]  Expires: 2018 10:15 AM     Your access code will  in 90 days. If you need help or a new code, please call your [facility] clinic or [phone].        Care EveryWhere ID     This is your Care EveryWhere ID. This could be used by other organizations to access your [facility] medical records  [id number]        Your Vitals Were     Pulse Temperature Last Period Pulse Oximetry BMI (Body Mass Index)       67 98.1  F (36.7  C) (Oral) 2018 98% 29.38 kg/m2        Blood Pressure from Last 3 Encounters:   18 100/71   18 124/72   18 119/83    Weight from " Last 3 Encounters:   05/09/18 146 lb 11.2 oz (66.5 kg)   05/08/18 147 lb 3.2 oz (66.8 kg)   04/27/18 146 lb (66.2 kg)              We Performed the Following     ABO/Rh type and screen     CBC with platelets          Today's Medication Changes          These changes are accurate as of 5/9/18  1:32 PM.  If you have any questions, ask your nurse or doctor.               Stop taking these medicines if you haven't already. Please contact your care team if you have questions.     Prenatal Vitamins 28-0.8 MG Tabs   Stopped by:  Edmond Garvin MD                    Primary Care Provider Office Phone # Fax #    America Andre, NATAN [phone]       [address] [facility]  [place] [address]        Equal Access to Services     [facility]: Hadii anu spivey hadasho Soomaali, waaxda luqadaha, qaybta kaalmada adecolemanyaladan, suresh hayward . So [facility] [phone].    ATENCIÓN: Si habla español, tiene a harrison disposición servicios gratuitos de asistencia lingüística. [facility] [phone].    We comply with applicable federal civil rights laws and [place] laws. We do not discriminate on the basis of race, color, national origin, age, disability, sex, sexual orientation, or gender identity.            Thank you!     Thank you for choosing [facility]  for your care. Our goal is always to provide you with excellent care. Hearing back from our patients is one way we can continue to improve our services. Please take a few minutes to complete the written survey that you may receive in the mail after your visit with us. Thank you!             Your Updated Medication List - Protect others around you: Learn how to safely use, store and throw away your medicines at www.disposemymeds.org.          This list is accurate as of 5/9/18  1:32 PM.  Always use your most recent med list.                   Brand Name Dispense Instructions for use Diagnosis    levothyroxine 50 MCG tablet     SYNTHROID/LEVOTHROID    90 tablet    Take 1 tablet (50 mcg) by mouth daily    Hypothyroidism, unspecified type       misoprostol 200 MCG tablet    CYTOTEC    2 tablet    Take 2 tablets (400 mcg) by mouth 2 times daily    Missed ab

## 2018-05-09 NOTE — LETTER
[address] [address] [phone]    Bridgett Hardin    To whom it may concern:    Bridgett is under our care.  Please excuse her from work starting on May 10th through May 14th.  She may return to work as of May 15th without restriction.    Thank you.    Edmond Garvin MD FACOG  May 9, 2018

## 2018-05-09 NOTE — TELEPHONE ENCOUNTER
Surgery Scheduled.    Date of Surgery 18 Time of Surgery 2:30 pm  Procedure: Suction D&C  [facility]  Surgeon: Dr. Garvin  Type of Anesthesia Anticipated: Local with MAC  Pre-op: To be scheduled  Pre-certification 18  Consent signed: NA  Hospital Stay NA       Patient instructed NPO 12 hours prior to surgery, arrive 1 hours prior to surgery, must have a .  Patient understood and agrees to the plan.    Surgery Pre-Certification    Medical Record Number: [id number]  Bridgett Hardin  YOB: 1975   Phone: [phone] (home)   Primary Provider: America Andre    Reason for Admit:  Missed     Surgeon: DC Garvin MD  Surgical Procedure: Suction D&C  ICD-9 Coded: O02.1  Date of Surgery: 18  Consent signed? N/A   Hospital: [facility]  Outpatient    Requestor:  Soo Pal     Location:  [facility]      Ruma Turner CMA

## 2018-05-09 NOTE — PATIENT INSTRUCTIONS
If you have any questions regarding your visit, Please contact your care team.    Women s Health CLINIC HOURS TELEPHONE NUMBER   MD Brittany Durbin CMA Lisa -    AUDREY Cross       Monday:       7:30-4:30 [place]  Wednesday:       7:30-4:30 [place]  Thursday:       7:30-1:30 [place]  Friday:       7:30-11:30 [facility]  [address] [place]  [address]  [phone] ask for Women's [facility]  [address] [place] [address]  [phone] ask for [facility]    Imaging Scheduling for [place]:  [phone]    Imaging Scheduling for [place]: [phone]       Urgent Care locations:    [facility] Saturday and Sunday   9 am - 5 pm    Monday-Friday   12 pm - 8 pm  Saturday and Sunday   9 am - 5 pm   [phone]     [facility] Labor and Delivery:  [phone]    If you need a medication refill, please contact your pharmacy. Please allow 3 business days for your refill to be completed.  As always, Thank you for trusting us with your healthcare needs!

## 2018-05-09 NOTE — TELEPHONE ENCOUNTER
[facility] Call Center    Phone Message    May a detailed message be left on voicemail: yes    Reason for Call: Other: Patient is calling stating that she would like schedule surgery. Please advise,      Action Taken: Message routed to:  [facility] Clinic p [phone]

## 2018-05-09 NOTE — TELEPHONE ENCOUNTER
Phone call to patient. Scheduled her to see Dr. Garvin today at 1:00pm and have suction D&C at 2:30pm on 5-. Will give packet to patient at her appointment today.  Patient agrees and understands.  Ada Keen RN

## 2018-05-09 NOTE — TELEPHONE ENCOUNTER
US OB < 14 Weeks w Transvaginal   Status:  Final result   Visible to patient:  No (Not Released) Dx:  Threatened  Order: [id number]       Notes Recorded by Agbeh, Cephas Mawuena, MD on 2018 at 5:51 AM  Please notify patient of results of ultrasound results.  Again no fetal heart beat is seen.  With the falling hcg levels, the pregnancy is nonviable.  She may proceed with the cytotec as ordered yesterday.  The alternative is to continue wait and repeat hcg levels weekly.  CEPHAS AGBEH, MD.     Spoke with patient and she states she does not want to do the cytotec or pass naturally. She is requesting a suction D&C.  Will call patient back when have a time.  Ada Keen RN

## 2018-05-09 NOTE — TELEPHONE ENCOUNTER
[facility] Call Center    Phone Message    May a detailed message be left on voicemail: yes    Reason for Call: Other: Has follow up questions regarding forms sent yesterday. Call to discuss      Action Taken: Message routed to:  [facility] Clinic p [phone]

## 2018-05-09 NOTE — PROGRESS NOTES
Bridgett is a 42 year old   is here today to discuss surgery for a Missed .  She had been seeing Dr. Agbeh who is out at present..  Her most recent ultrasound shows only a fetal pole and no development of cardiac activity and now her human chorionic gonadotropin levels are falling.  Dr. Agbeh had discussed with her expectant. Management for a spontaneous miscarriage versus cytotec, versus a DILATION AND CURRETAGE.  She wants to proceed with surgery.  She denies any pain of heavy bleeding.    No urinary frequency or dysuria, bladder or kidney problems    ROS: Ten point review of systems was reviewed and negative except the above.    Gyn Hx:      History reviewed. No pertinent past medical history.  History reviewed. No pertinent surgical history.  Patient Active Problem List   Diagnosis     Hypothyroidism, unspecified type     Supervision of high-risk pregnancy of elderly multigravida       ALL/Meds: Her medication and allergy histories were reviewed and are documented in their appropriate chart areas.    SH: Reviewed and documented in the appropriate area of the chart.  FH:  Her family history is reviewed and updated in the chart, today.  PMH: Her past medical, surgical, and obstetric histories were reviewed and updated today in the appropriate chart areas.    PE: /71  Pulse 67  Temp 98.1  F (36.7  C) (Oral)  Wt 146 lb 11.2 oz (66.5 kg)  LMP 2018  SpO2 98%  BMI 29.38 kg/m2  Body mass index is 29.38 kg/(m^2).    General Appearance:  healthy, alert, active, no distress  Cardiovascular:  Regular rate and Rhythm  Neck: Supple, no adenopathy and thyroid normal  Lungs:  Clear, without wheeze, rale or rhonchi  Abdomen: Benign, Soft, flat, non-tender, No masses, organomegaly, No inguinal nodes and Bowel sounds normoactive.   Pelvic:deferred  Discussed the risks of a DILATION AND CURRETAGE including the risks of bleeding, infection as well as perforation with injury to the  bowel/bladder.  Discussed the outpatient nature and followup needed.  She does agree to proceed.      A/P:(O02.1) Missed   (primary encounter diagnosis)  Comment: She appears to understand and agrees to proceed.  She denies receiving rhogam with her previous pregnancies, but no T&S available.  We will order it today.  Plan: CBC with platelets, ABO/Rh type and screen                 -  Out of 30 minutes spent face to face with the patient, > 50% of this was spent in consultation.   - No orders of the defined types were placed in this encounter.

## 2018-05-10 NOTE — TELEPHONE ENCOUNTER
Returned call to Jose at [place] [facility]. Jose was calling to confirm patients miscarriage per form. Encounter closed.      Wanda Thorne CMA

## 2018-05-11 ENCOUNTER — HOSPITAL ENCOUNTER (OUTPATIENT)
Facility: AMBULATORY SURGERY CENTER | Age: 43
Discharge: HOME | End: 2018-05-11
Attending: OBSTETRICS & GYNECOLOGY | Admitting: OBSTETRICS & GYNECOLOGY
Payer: COMMERCIAL

## 2018-05-11 ENCOUNTER — TELEPHONE (OUTPATIENT)
Dept: OBGYN | Facility: CLINIC | Age: 43
End: 2018-05-11

## 2018-05-11 ENCOUNTER — SURGERY (OUTPATIENT)
Age: 43
End: 2018-05-11

## 2018-05-11 ENCOUNTER — ANESTHESIA (OUTPATIENT)
Dept: SURGERY | Facility: AMBULATORY SURGERY CENTER | Age: 43
End: 2018-05-11

## 2018-05-11 VITALS
DIASTOLIC BLOOD PRESSURE: 83 MMHG | SYSTOLIC BLOOD PRESSURE: 125 MMHG | OXYGEN SATURATION: 100 % | TEMPERATURE: 98.4 F | RESPIRATION RATE: 16 BRPM

## 2018-05-11 DIAGNOSIS — Z98.890 POST-OPERATIVE STATE: Primary | ICD-10-CM

## 2018-05-11 PROCEDURE — 59820 CARE OF MISCARRIAGE: CPT | Performed by: OBSTETRICS & GYNECOLOGY

## 2018-05-11 PROCEDURE — 88305 TISSUE EXAM BY PATHOLOGIST: CPT | Performed by: OBSTETRICS & GYNECOLOGY

## 2018-05-11 PROCEDURE — G8907 PT DOC NO EVENTS ON DISCHARG: HCPCS

## 2018-05-11 PROCEDURE — G8916 PT W IV AB GIVEN ON TIME: HCPCS

## 2018-05-11 PROCEDURE — 00000159 ZZHCL STATISTIC H-SEND OUTS PREP: Performed by: OBSTETRICS & GYNECOLOGY

## 2018-05-11 PROCEDURE — 59820 CARE OF MISCARRIAGE: CPT

## 2018-05-11 RX ORDER — DEXAMETHASONE SODIUM PHOSPHATE 4 MG/ML
INJECTION, SOLUTION INTRA-ARTICULAR; INTRALESIONAL; INTRAMUSCULAR; INTRAVENOUS; SOFT TISSUE PRN
Status: DISCONTINUED | OUTPATIENT
Start: 2018-05-11 | End: 2018-05-11

## 2018-05-11 RX ORDER — ONDANSETRON 2 MG/ML
INJECTION INTRAMUSCULAR; INTRAVENOUS PRN
Status: DISCONTINUED | OUTPATIENT
Start: 2018-05-11 | End: 2018-05-11

## 2018-05-11 RX ORDER — FENTANYL CITRATE 50 UG/ML
25-50 INJECTION, SOLUTION INTRAMUSCULAR; INTRAVENOUS
Status: DISCONTINUED | OUTPATIENT
Start: 2018-05-11 | End: 2018-05-12 | Stop reason: HOSPADM

## 2018-05-11 RX ORDER — ONDANSETRON 4 MG/1
4 TABLET, ORALLY DISINTEGRATING ORAL EVERY 30 MIN PRN
Status: DISCONTINUED | OUTPATIENT
Start: 2018-05-11 | End: 2018-05-12 | Stop reason: HOSPADM

## 2018-05-11 RX ORDER — HYDROMORPHONE HYDROCHLORIDE 1 MG/ML
.3-.5 INJECTION, SOLUTION INTRAMUSCULAR; INTRAVENOUS; SUBCUTANEOUS EVERY 10 MIN PRN
Status: DISCONTINUED | OUTPATIENT
Start: 2018-05-11 | End: 2018-05-12 | Stop reason: HOSPADM

## 2018-05-11 RX ORDER — PROPOFOL 10 MG/ML
INJECTION, EMULSION INTRAVENOUS PRN
Status: DISCONTINUED | OUTPATIENT
Start: 2018-05-11 | End: 2018-05-11

## 2018-05-11 RX ORDER — CEFAZOLIN SODIUM 1 G/3ML
1 INJECTION, POWDER, FOR SOLUTION INTRAMUSCULAR; INTRAVENOUS SEE ADMIN INSTRUCTIONS
Status: DISCONTINUED | OUTPATIENT
Start: 2018-05-11 | End: 2018-05-12 | Stop reason: HOSPADM

## 2018-05-11 RX ORDER — SODIUM CHLORIDE, SODIUM LACTATE, POTASSIUM CHLORIDE, CALCIUM CHLORIDE 600; 310; 30; 20 MG/100ML; MG/100ML; MG/100ML; MG/100ML
INJECTION, SOLUTION INTRAVENOUS CONTINUOUS PRN
Status: DISCONTINUED | OUTPATIENT
Start: 2018-05-11 | End: 2018-05-11

## 2018-05-11 RX ORDER — KETOROLAC TROMETHAMINE 30 MG/ML
INJECTION, SOLUTION INTRAMUSCULAR; INTRAVENOUS PRN
Status: DISCONTINUED | OUTPATIENT
Start: 2018-05-11 | End: 2018-05-11

## 2018-05-11 RX ORDER — METHYLERGONOVINE MALEATE 0.2 MG/1
0.2 TABLET ORAL 3 TIMES DAILY
Qty: 9 TABLET | Refills: 0 | Status: SHIPPED | OUTPATIENT
Start: 2018-05-11 | End: 2018-06-08

## 2018-05-11 RX ORDER — METHYLERGONOVINE MALEATE 0.2 MG/ML
INJECTION INTRAVENOUS PRN
Status: DISCONTINUED | OUTPATIENT
Start: 2018-05-11 | End: 2018-05-11

## 2018-05-11 RX ORDER — NALOXONE HYDROCHLORIDE 0.4 MG/ML
.1-.4 INJECTION, SOLUTION INTRAMUSCULAR; INTRAVENOUS; SUBCUTANEOUS
Status: DISCONTINUED | OUTPATIENT
Start: 2018-05-11 | End: 2018-05-12 | Stop reason: HOSPADM

## 2018-05-11 RX ORDER — OXYCODONE HYDROCHLORIDE 5 MG/1
5 TABLET ORAL
Status: DISCONTINUED | OUTPATIENT
Start: 2018-05-11 | End: 2018-05-12 | Stop reason: HOSPADM

## 2018-05-11 RX ORDER — BUPIVACAINE HYDROCHLORIDE AND EPINEPHRINE 5; 5 MG/ML; UG/ML
INJECTION, SOLUTION PERINEURAL PRN
Status: DISCONTINUED | OUTPATIENT
Start: 2018-05-11 | End: 2018-05-11 | Stop reason: HOSPADM

## 2018-05-11 RX ORDER — CEFAZOLIN SODIUM 2 G/100ML
2 INJECTION, SOLUTION INTRAVENOUS
Status: COMPLETED | OUTPATIENT
Start: 2018-05-11 | End: 2018-05-11

## 2018-05-11 RX ORDER — LIDOCAINE HYDROCHLORIDE 20 MG/ML
INJECTION, SOLUTION INFILTRATION; PERINEURAL PRN
Status: DISCONTINUED | OUTPATIENT
Start: 2018-05-11 | End: 2018-05-11

## 2018-05-11 RX ORDER — DEXAMETHASONE SODIUM PHOSPHATE 4 MG/ML
4 INJECTION, SOLUTION INTRA-ARTICULAR; INTRALESIONAL; INTRAMUSCULAR; INTRAVENOUS; SOFT TISSUE EVERY 10 MIN PRN
Status: DISCONTINUED | OUTPATIENT
Start: 2018-05-11 | End: 2018-05-12 | Stop reason: HOSPADM

## 2018-05-11 RX ORDER — ALBUTEROL SULFATE 0.83 MG/ML
2.5 SOLUTION RESPIRATORY (INHALATION) EVERY 4 HOURS PRN
Status: DISCONTINUED | OUTPATIENT
Start: 2018-05-11 | End: 2018-05-12 | Stop reason: HOSPADM

## 2018-05-11 RX ORDER — PROPOFOL 10 MG/ML
INJECTION, EMULSION INTRAVENOUS CONTINUOUS PRN
Status: DISCONTINUED | OUTPATIENT
Start: 2018-05-11 | End: 2018-05-11

## 2018-05-11 RX ORDER — SODIUM CHLORIDE, SODIUM LACTATE, POTASSIUM CHLORIDE, CALCIUM CHLORIDE 600; 310; 30; 20 MG/100ML; MG/100ML; MG/100ML; MG/100ML
INJECTION, SOLUTION INTRAVENOUS CONTINUOUS
Status: DISCONTINUED | OUTPATIENT
Start: 2018-05-11 | End: 2018-05-12 | Stop reason: HOSPADM

## 2018-05-11 RX ORDER — HYDRALAZINE HYDROCHLORIDE 20 MG/ML
2.5-5 INJECTION INTRAMUSCULAR; INTRAVENOUS EVERY 10 MIN PRN
Status: DISCONTINUED | OUTPATIENT
Start: 2018-05-11 | End: 2018-05-12 | Stop reason: HOSPADM

## 2018-05-11 RX ORDER — FENTANYL CITRATE 50 UG/ML
INJECTION, SOLUTION INTRAMUSCULAR; INTRAVENOUS PRN
Status: DISCONTINUED | OUTPATIENT
Start: 2018-05-11 | End: 2018-05-11

## 2018-05-11 RX ORDER — OXYCODONE HYDROCHLORIDE 5 MG/1
10 TABLET ORAL EVERY 4 HOURS PRN
Status: DISCONTINUED | OUTPATIENT
Start: 2018-05-11 | End: 2018-05-12 | Stop reason: HOSPADM

## 2018-05-11 RX ORDER — ACETAMINOPHEN 325 MG/1
975 TABLET ORAL ONCE
Status: COMPLETED | OUTPATIENT
Start: 2018-05-11 | End: 2018-05-11

## 2018-05-11 RX ORDER — PHYSOSTIGMINE SALICYLATE 1 MG/ML
1.2 INJECTION INTRAVENOUS
Status: DISCONTINUED | OUTPATIENT
Start: 2018-05-11 | End: 2018-05-12 | Stop reason: HOSPADM

## 2018-05-11 RX ORDER — DOXYCYCLINE 100 MG/1
100 CAPSULE ORAL 2 TIMES DAILY
Qty: 10 CAPSULE | Refills: 0 | Status: SHIPPED | OUTPATIENT
Start: 2018-05-11 | End: 2018-06-08

## 2018-05-11 RX ORDER — HYDROCODONE BITARTRATE AND ACETAMINOPHEN 5; 325 MG/1; MG/1
1 TABLET ORAL EVERY 6 HOURS PRN
Qty: 5 TABLET | Refills: 0 | Status: SHIPPED | OUTPATIENT
Start: 2018-05-11 | End: 2018-06-08

## 2018-05-11 RX ORDER — ONDANSETRON 2 MG/ML
4 INJECTION INTRAMUSCULAR; INTRAVENOUS EVERY 30 MIN PRN
Status: DISCONTINUED | OUTPATIENT
Start: 2018-05-11 | End: 2018-05-12 | Stop reason: HOSPADM

## 2018-05-11 RX ORDER — MEPERIDINE HYDROCHLORIDE 25 MG/ML
12.5 INJECTION INTRAMUSCULAR; INTRAVENOUS; SUBCUTANEOUS
Status: DISCONTINUED | OUTPATIENT
Start: 2018-05-11 | End: 2018-05-12 | Stop reason: HOSPADM

## 2018-05-11 RX ADMIN — DEXAMETHASONE SODIUM PHOSPHATE 4 MG: 4 INJECTION, SOLUTION INTRA-ARTICULAR; INTRALESIONAL; INTRAMUSCULAR; INTRAVENOUS; SOFT TISSUE at 14:22

## 2018-05-11 RX ADMIN — ACETAMINOPHEN 975 MG: 325 TABLET ORAL at 13:57

## 2018-05-11 RX ADMIN — CEFAZOLIN SODIUM 2 G: 2 INJECTION, SOLUTION INTRAVENOUS at 14:22

## 2018-05-11 RX ADMIN — ONDANSETRON 4 MG: 2 INJECTION INTRAMUSCULAR; INTRAVENOUS at 14:22

## 2018-05-11 RX ADMIN — METHYLERGONOVINE MALEATE 200 MCG: 0.2 INJECTION INTRAVENOUS at 14:35

## 2018-05-11 RX ADMIN — BUPIVACAINE HYDROCHLORIDE AND EPINEPHRINE 10 ML: 5; 5 INJECTION, SOLUTION PERINEURAL at 14:30

## 2018-05-11 RX ADMIN — PROPOFOL 100 MCG/KG/MIN: 10 INJECTION, EMULSION INTRAVENOUS at 14:20

## 2018-05-11 RX ADMIN — FENTANYL CITRATE 25 MCG: 50 INJECTION, SOLUTION INTRAMUSCULAR; INTRAVENOUS at 14:28

## 2018-05-11 RX ADMIN — LIDOCAINE HYDROCHLORIDE 60 MG: 20 INJECTION, SOLUTION INFILTRATION; PERINEURAL at 14:20

## 2018-05-11 RX ADMIN — PROPOFOL 50 MG: 10 INJECTION, EMULSION INTRAVENOUS at 14:20

## 2018-05-11 RX ADMIN — SODIUM CHLORIDE, SODIUM LACTATE, POTASSIUM CHLORIDE, CALCIUM CHLORIDE: 600; 310; 30; 20 INJECTION, SOLUTION INTRAVENOUS at 14:19

## 2018-05-11 RX ADMIN — KETOROLAC TROMETHAMINE 30 MG: 30 INJECTION, SOLUTION INTRAMUSCULAR; INTRAVENOUS at 14:30

## 2018-05-11 NOTE — ANESTHESIA PREPROCEDURE EVALUATION
Anesthesia Evaluation     . Pt has not had prior anesthetic            ROS/MED HX    ENT/Pulmonary:  - neg pulmonary ROS     Neurologic:  - neg neurologic ROS     Cardiovascular:  - neg cardiovascular ROS       METS/Exercise Tolerance:     Hematologic:  - neg hematologic  ROS       Musculoskeletal:  - neg musculoskeletal ROS       GI/Hepatic:  - neg GI/hepatic ROS       Renal/Genitourinary:  - ROS Renal section negative       Endo:     (+) thyroid problem hypothyroidism, .      Psychiatric:  - neg psychiatric ROS       Infectious Disease:  - neg infectious disease ROS       Malignancy:      - no malignancy   Other:    - neg other ROS                 Physical Exam  Normal systems: cardiovascular, pulmonary and dental    Airway   Mallampati: I  TM distance: >3 FB  Neck ROM: full    Dental     Cardiovascular       Pulmonary    breath sounds clear to auscultation                    Anesthesia Plan      History & Physical Review  History and physical reviewed and following examination; no interval change.    ASA Status:  1 .    NPO Status:  > 8 hours    Plan for MAC with Intravenous and Propofol induction. Maintenance will be TIVA.  Reason for MAC:  Extreme anxiety (QS)  PONV prophylaxis:  Ondansetron (or other 5HT-3) and Dexamethasone or Solumedrol       Postoperative Care  Postoperative pain management:  Multi-modal analgesia.      Consents  Anesthetic plan, risks, benefits and alternatives discussed with:  Patient and Spouse..                          .

## 2018-05-11 NOTE — ANESTHESIA CARE TRANSFER NOTE
Patient: Bridgett Hardin    Procedure(s):  Suction D & C - Wound Class: II-Clean Contaminated    Diagnosis: Missed   Diagnosis Additional Information: No value filed.    Anesthesia Type:   MAC     Note:  Airway :Room Air  Patient transferred to:Phase II        Vitals: (Last set prior to Anesthesia Care Transfer)    CRNA VITALS  2018 1410 - 2018 1444      2018             Pulse: 92    SpO2: 95 %                Electronically Signed By: CORINNE Haley CRNA  May 11, 2018  2:44 PM

## 2018-05-11 NOTE — DISCHARGE INSTRUCTIONS
[facility]  Same-Day Surgery   Adult Discharge Orders & Instructions   For 24 hours after surgery  1. Get plenty of rest.  A responsible adult must stay with you for at least 24 hours after you leave the hospital.   2. Do not drive or use heavy equipment.  If you have weakness or tingling, don't drive or use heavy equipment until this feeling goes away.  3. Do not drink alcohol.  4. Avoid strenuous or risky activities.  Ask for help when climbing stairs.   5. You may feel lightheaded.  IF so, sit for a few minutes before standing.  Have someone help you get up.   6. If you have nausea (feel sick to your stomach): Drink only clear liquids such as apple juice, ginger ale, broth or 7-Up.  Rest may also help.  Be sure to drink enough fluids.  Move to a regular diet as you feel able.  7. You may have a slight fever. Call the doctor if your fever is over 100 F (37.7 C) (taken under the tongue) or lasts longer than 24 hours.  8. You may have a dry mouth, a sore throat, muscle aches or trouble sleeping.  These should go away after 24 hours.  9. Do not make important or legal decisions.   Call your doctor for any of the followin.  Signs of infection (fever, growing tenderness at the surgery site, a large amount of drainage or bleeding, severe pain, foul-smelling drainage, redness, swelling).    2. It has been over 8 to 10 hours since surgery and you are still not able to urinate (pass water).    3.  Headache for over 24 hours.    4.  Numbness, tingling or weakness the day after surgery (if you had spinal anesthesia).  To contact a doctor, call ___[phone]________________________      Gynecology Outpatient Post-operative Instructions    1.Dressing (if present) may be removed tomorrow.  Leave incision uncovered.    2.You may shower as normal tomorrow.    3. You may eat as tolerated today.    4. You may take ibuprofen over the counter as tolerated.    5. Tomorrow, lifting and physical activity  as tolerated.    6.  You may drive when you are no longer requiring narcotic pain medication.    7. You may return to work/school when you feel able.    8.  Please contact my office with any problems.    9.  I would like to touch base in 2 weeks.  Please either send me a "[employer] message or call our office and let us know how you are doing.

## 2018-05-11 NOTE — TELEPHONE ENCOUNTER
Pharmacy called stating that Methergine prescribed today is not covered by insurance and out of pocket cost is $800.00. Pharmacy inquiring if Dr. Garvin would like to prescribe an alternative medication or if patient is okay to go without medication following D&C today. Dr. Garvin was paged and upon return call he stated that patient could use a prior prescription Cytotec that was previously filled instead of Methergine. This information was then relayed to onsite pharmacy who then informed the patient.      Wanda Thorne CMA  May 11, 2018  3:52 PM

## 2018-05-11 NOTE — TELEPHONE ENCOUNTER
Per [employer] SENIOR online CPT code: 82911 will not need an auth or notification on this plan. 05/11/18 1:25pm

## 2018-05-11 NOTE — ANESTHESIA POSTPROCEDURE EVALUATION
Patient: Bridgett Hardin    Procedure(s):  Suction D & C - Wound Class: II-Clean Contaminated    Diagnosis:Missed   Diagnosis Additional Information: No value filed.    Anesthesia Type:  MAC    Note:  Anesthesia Post Evaluation    Patient location during evaluation: PACU  Patient participation: Able to fully participate in evaluation  Level of consciousness: awake  Pain management: adequate  Airway patency: patent  Cardiovascular status: acceptable  Respiratory status: acceptable  Hydration status: acceptable  PONV: none     Anesthetic complications: None    Comments: Stable recovery noted        Last vitals:  Vitals:    18 1443 18 1445 18 1500   BP: 109/84 107/69 125/83   Resp: 16 16 16   Temp: 98.4  F (36.9  C)     SpO2: 99% 99% 100%         Electronically Signed By: Izaiah Wells MD  May 11, 2018  4:18 PM

## 2018-05-11 NOTE — IP AVS SNAPSHOT
MRN:[id number]                      After Visit Summary   5/11/2018    Bridgett Hardin    MRN: [id number]           Thank you!     Thank you for choosing [facility] for your care. Our goal is always to provide you with excellent care. Hearing back from our patients is one way we can continue to improve our services. Please take a few minutes to complete the written survey that you may receive in the mail after you visit with us. Thank you!        Patient Information     Date Of Birth          1975        About your hospital stay     You were admitted on:  May 11, 2018 [place]  [facility] [place]    You were discharged on:  May 11, 2018       Who to Call     For medical emergencies, please call 911.  For non-urgent questions about your medical care, please call your primary care provider or clinic, [phone]  For questions related to your surgery, please call your surgery clinic        Attending Provider     Provider Edmond Cochran MD OB/Gyn       Primary Care Provider Office Phone # Fax #    America NATAN Andre [phone]      After Care Instructions     Discharge Instructions       Resume pre procedure diet            Discharge Instructions       Pelvic Rest. No tampons, douching or intercourse for  1  week.            No alcohol       NO ALCOHOL for 24 hours post procedure            No driving or operating machinery       No driving or operating machinery until day after procedure                  Your next 10 appointments already scheduled     May 24, 2018  2:15 PM CDT   [facility]ABL[facility] PRENATAL with Cephas Mawuena [facility]beh[facility]    [address] [place] [id number] [phone]              Further instructions from your care team       [facility] [place]  Same-Day Surgery   Adult Discharge Orders & Instructions   For 24 hours after surgery  1. Get plenty  of rest.  A responsible adult must stay with you for at least 24 hours after you leave the hospital.   2. Do not drive or use heavy equipment.  If you have weakness or tingling, don't drive or use heavy equipment until this feeling goes away.  3. Do not drink alcohol.  4. Avoid strenuous or risky activities.  Ask for help when climbing stairs.   5. You may feel lightheaded.  IF so, sit for a few minutes before standing.  Have someone help you get up.   6. If you have nausea (feel sick to your stomach): Drink only clear liquids such as apple juice, ginger ale, broth or 7-Up.  Rest may also help.  Be sure to drink enough fluids.  Move to a regular diet as you feel able.  7. You may have a slight fever. Call the doctor if your fever is over 100 F (37.7 C) (taken under the tongue) or lasts longer than 24 hours.  8. You may have a dry mouth, a sore throat, muscle aches or trouble sleeping.  These should go away after 24 hours.  9. Do not make important or legal decisions.   Call your doctor for any of the followin.  Signs of infection (fever, growing tenderness at the surgery site, a large amount of drainage or bleeding, severe pain, foul-smelling drainage, redness, swelling).    2. It has been over 8 to 10 hours since surgery and you are still not able to urinate (pass water).    3.  Headache for over 24 hours.    4.  Numbness, tingling or weakness the day after surgery (if you had spinal anesthesia).  To contact a doctor, call ___[phone]________________________      Gynecology Outpatient Post-operative Instructions    1.Dressing (if present) may be removed tomorrow.  Leave incision uncovered.    2.You may shower as normal tomorrow.    3. You may eat as tolerated today.    4. You may take ibuprofen over the counter as tolerated.    5. Tomorrow, lifting and physical activity as tolerated.    6.  You may drive when you are no longer requiring narcotic pain medication.    7. You may return to work/school when you  "feel able.    8.  Please contact my office with any problems.    9.  I would like to touch base in 2 weeks.  Please either send me a [employer] message or call our office and let us know how you are doing.    Pending Results     No orders found from 2018 to 2018.            Admission Information     Date & Time Provider Department Dept. Phone    2018 Edmond Garvin MD [facility] [phone]      Your Vitals Were     Blood Pressure Temperature Respirations Last Period Pulse Oximetry       109/84 98.4  F (36.9  C) (Temporal) 16 2018 99%       MyChart Information     [employer] lets you send messages to your doctor, view your test results, renew your prescriptions, schedule appointments and more. To sign up, go to [email or web address] . Click on \"Log in\" on the left side of the screen, which will take you to the Welcome page. Then click on \"Sign up Now\" on the right side of the page.     You will be asked to enter the access code listed below, as well as some personal information. Please follow the directions to create your username and password.     Your access code is: [email or web address]  Expires: 2018 10:15 AM     Your access code will  in 90 days. If you need help or a new code, please call your [facility] clinic or [phone].        Care EveryWhere ID     This is your Care EveryWhere ID. This could be used by other organizations to access your [facility] medical records  [id number]        Equal Access to Services     [facility] [place] : Hadii anu spivey hadasho Soomaali, waaxda luqadaha, qaybta kaalmada adeegyada, suresh hayward . So [facility] [phone].    ATENCIÓN: Si habla español, tiene a harrison disposición servicios gratuitos de asistencia lingüística. Llame al [phone].    We comply with applicable federal civil rights laws and [place] laws. We do not discriminate on the basis of race, color, national origin, age, disability, sex, sexual " orientation, or gender identity.               Review of your medicines      START taking        Dose / Directions    doxycycline 100 MG capsule   Commonly known as:  VIBRAMYCIN   Used for:  Post-operative state        Dose:  100 mg   Take 1 capsule (100 mg) by mouth 2 times daily   Quantity:  10 capsule   Refills:  0       HYDROcodone-acetaminophen 5-325 MG per tablet   Commonly known as:  NORCO   Used for:  Post-operative state        Dose:  1 tablet   Take 1 tablet by mouth every 6 hours as needed for other (Moderate to Severe Pain)   Quantity:  5 tablet   Refills:  0       methylergonovine 0.2 MG tablet   Commonly known as:  METHERGINE   Used for:  Post-operative state        Dose:  0.2 mg   Take 1 tablet (200 mcg) by mouth 3 times daily   Quantity:  9 tablet   Refills:  0         CONTINUE these medicines which have NOT CHANGED        Dose / Directions    levothyroxine 50 MCG tablet   Commonly known as:  SYNTHROID/LEVOTHROID   Used for:  Hypothyroidism, unspecified type        Dose:  50 mcg   Take 1 tablet (50 mcg) by mouth daily   Quantity:  90 tablet   Refills:  0         STOP taking     misoprostol 200 MCG tablet   Commonly known as:  CYTOTEC                Where to get your medicines      Some of these will need a paper prescription and others can be bought over the counter. Ask your nurse if you have questions.     Bring a paper prescription for each of these medications     doxycycline 100 MG capsule    HYDROcodone-acetaminophen 5-325 MG per tablet    methylergonovine 0.2 MG tablet                Protect others around you: Learn how to safely use, store and throw away your medicines at www.disposemymeds.org.        ANTIBIOTIC INSTRUCTION     You've Been Prescribed an Antibiotic - Now What?  Your healthcare team thinks that you or your loved one might have an infection. Some infections can be treated with antibiotics, which are powerful, life-saving drugs. Like all medications, antibiotics have side effects  and should only be used when necessary. There are some important things you should know about your antibiotic treatment.      Your healthcare team may run tests before you start taking an antibiotic.    Your team may take samples (e.g., from your blood, urine or other areas) to run tests to look for bacteria. These test can be important to determine if you need an antibiotic at all and, if you do, which antibiotic will work best.      Within a few days, your healthcare team might change or even stop your antibiotic.    Your team may start you on an antibiotic while they are working to find out what is making you sick.    Your team might change your antibiotic because test results show that a different antibiotic would be better to treat your infection.    In some cases, once your team has more information, they learn that you do not need an antibiotic at all. They may find out that you don't have an infection, or that the antibiotic you're taking won't work against your infection. For example, an infection caused by a virus can't be treated with antibiotics. Staying on an antibiotic when you don't need it is more likely to be harmful than helpful.      You may experience side effects from your antibiotic.    Like all medications, antibiotics have side effects. Some of these can be serious.    Let you healthcare team know if you have any known allergies when you are admitted to the hospital.    One significant side effect of nearly all antibiotics is the risk of severe and sometimes deadly diarrhea caused by Clostridium difficile (C. Difficile). This occurs when a person takes antibiotics because some good germs are destroyed. Antibiotic use allows C. diificile to take over, putting patients at high risk for this serious infection.    As a patient or caregiver, it is important to understand your or your loved one's antibiotic treatment. It is especially important for caregivers to speak up when patients can't speak  for themselves. Here are some important questions to ask your healthcare team.    What infection is this antibiotic treating and how do you know I have that infection?    What side effects might occur from this antibiotic?    How long will I need to take this antibiotic?    Is it safe to take this antibiotic with other medications or supplements (e.g., vitamins) that I am taking?     Are there any special directions I need to know about taking this antibiotic? For example, should I take it with food?    How will I be monitored to know whether my infection is responding to the antibiotic?    What tests may help to make sure the right antibiotic is prescribed for me?      Information provided by:  www.cdc.gov/getsmart  U.S. Department of Health and Human Services  Centers for disease Control and Prevention  National Center for Emerging and Zoonotic Infectious Diseases  Division of Healthcare Quality Promotion        Information about OPIOIDS     PRESCRIPTION OPIOIDS: WHAT YOU NEED TO KNOW   You have a prescription for an opioid (narcotic) pain medicine. Opioids can cause addiction. If you have a history of chemical dependency of any type, you are at a higher risk of becoming addicted to opioids. Only take this medicine after all other options have been tried. Take it for as short a time and as few doses as possible.     Do not:    Drive. If you drive while taking these medicines, you could be arrested for driving under the influence (DUI).    Operate heavy machinery    Do any other dangerous activities while taking these medicines.     Drink any alcohol while taking these medicines.      Take with any other medicines that contain acetaminophen. Read all labels carefully. Look for the word  acetaminophen  or  Tylenol.  Ask your pharmacist if you have questions or are unsure.    Store your pills in a secure place, locked if possible. We will not replace any lost or stolen medicine. If you don t finish your medicine,  please throw away (dispose) as directed by your pharmacist. The [place] Pollution Control Agency has more information about safe disposal: https://www.pca.[facility].[place].us/living-green/managing-unwanted-medications    All opioids tend to cause constipation. Drink plenty of water and eat foods that have a lot of fiber, such as fruits, vegetables, prune juice, apple juice and high-fiber cereal. Take a laxative (Miralax, milk of magnesia, Colace, Senna) if you don t move your bowels at least every other day.              Medication List: This is a list of all your medications and when to take them. Check marks below indicate your daily home schedule. Keep this list as a reference.      Medications           Morning Afternoon Evening Bedtime As Needed    doxycycline 100 MG capsule   Commonly known as:  VIBRAMYCIN   Take 1 capsule (100 mg) by mouth 2 times daily                                HYDROcodone-acetaminophen 5-325 MG per tablet   Commonly known as:  NORCO   Take 1 tablet by mouth every 6 hours as needed for other (Moderate to Severe Pain)                                levothyroxine 50 MCG tablet   Commonly known as:  SYNTHROID/LEVOTHROID   Take 1 tablet (50 mcg) by mouth daily                                methylergonovine 0.2 MG tablet   Commonly known as:  METHERGINE   Take 1 tablet (200 mcg) by mouth 3 times daily

## 2018-05-11 NOTE — PROCEDURES
Procedure: Suction Dilation and Curretage  Surgeon: Edmond Garvin  Assistants: [facility] OR staff  Anesthesia: Local and IV sedation    Findings: medium amount of Products of conception, Normal uterus and cervix     Procedure:  After anesthesia was induced, the patient was placed in dorso-lithotomy position and prepped and draped in sterile fashion.  I verified her bladder was empty.  0.25% marcain without epinephrine  was injected at 3 and 9 o'clock into the cervix.  Suction was verified to 50 mm of mercury.  Her cervix  was not dilated . It was dilated to accept a # 7 dilator.  A 7 mm curved suction currete was introduced.  Using a gentle rotating motion, a moderate amount of POC was removed.  This was repeated until no further tissue was noted.  Sharp curretage was performed until a gritty texture was noted in all quadrants.  The suction currette was replaced and the remaining intrauterine contents removed.  The weighted speculum and tenaculum were removed.  Estimated blood loss was 25 cc.  Sponge and needle counts were correct X2.  Pathology: Products of Conception  Implants:  None  Complications:  none  She tolerated the procedure well and was taken to recovery in stable condition.

## 2018-05-11 NOTE — IP AVS SNAPSHOT
[facility]    [address] [place] [address]    Phone:  [phone]                                       After Visit Summary   5/11/2018    Bridgett Hardin    MRN: [id number]           After Visit Summary Signature Page     I have received my discharge instructions, and my questions have been answered. I have discussed any challenges I see with this plan with the nurse or doctor.    ..........................................................................................................................................  Patient/Patient Representative Signature      ..........................................................................................................................................  Patient Representative Print Name and Relationship to Patient    ..................................................               ................................................  Date                                            Time    ..........................................................................................................................................  Reviewed by Signature/Title    ...................................................              ..............................................  Date                                                            Time

## 2018-05-15 ENCOUNTER — TELEPHONE (OUTPATIENT)
Dept: OBGYN | Facility: CLINIC | Age: 43
End: 2018-05-15

## 2018-05-15 LAB — COPATH REPORT: NORMAL

## 2018-05-15 NOTE — TELEPHONE ENCOUNTER
Public Health nurse needs to know patient's due date. She also wants to know if patient has been referred to Infectious Disease or a Gastro DrOlena Confidential voicemail, Ok to leave a message.

## 2018-05-15 NOTE — TELEPHONE ENCOUNTER
Left detailed message for Paty stating patient is no longer pregnant and had a D & C for fetal demise on 05-11-18. Paty's voicemail stated she is out of clinic until 05-22-18. Explained no orders to be seen by ID or GI. Left call back number in case of more questions. America Kaur RN, BAN

## 2018-06-08 ENCOUNTER — OFFICE VISIT (OUTPATIENT)
Dept: FAMILY MEDICINE | Facility: CLINIC | Age: 43
End: 2018-06-08
Payer: COMMERCIAL

## 2018-06-08 VITALS
BODY MASS INDEX: 28.2 KG/M2 | SYSTOLIC BLOOD PRESSURE: 121 MMHG | HEART RATE: 100 BPM | DIASTOLIC BLOOD PRESSURE: 82 MMHG | RESPIRATION RATE: 16 BRPM | WEIGHT: 140.8 LBS | TEMPERATURE: 99.4 F

## 2018-06-08 DIAGNOSIS — R30.0 DYSURIA: ICD-10-CM

## 2018-06-08 DIAGNOSIS — B00.1 COLD SORE: ICD-10-CM

## 2018-06-08 DIAGNOSIS — N30.01 ACUTE CYSTITIS WITH HEMATURIA: Primary | ICD-10-CM

## 2018-06-08 LAB
ALBUMIN UR-MCNC: NEGATIVE MG/DL
APPEARANCE UR: ABNORMAL
BILIRUB UR QL STRIP: NEGATIVE
COLOR UR AUTO: YELLOW
GLUCOSE UR STRIP-MCNC: NEGATIVE MG/DL
HGB UR QL STRIP: ABNORMAL
KETONES UR STRIP-MCNC: NEGATIVE MG/DL
LEUKOCYTE ESTERASE UR QL STRIP: ABNORMAL
NITRATE UR QL: NEGATIVE
NON-SQ EPI CELLS #/AREA URNS LPF: ABNORMAL /LPF
PH UR STRIP: 6 PH (ref 5–7)
RBC #/AREA URNS AUTO: ABNORMAL /HPF
SOURCE: ABNORMAL
SP GR UR STRIP: 1.01 (ref 1–1.03)
UROBILINOGEN UR STRIP-ACNC: 0.2 EU/DL (ref 0.2–1)
WBC #/AREA URNS AUTO: ABNORMAL /HPF

## 2018-06-08 PROCEDURE — 81001 URINALYSIS AUTO W/SCOPE: CPT | Performed by: FAMILY MEDICINE

## 2018-06-08 PROCEDURE — 87086 URINE CULTURE/COLONY COUNT: CPT | Performed by: FAMILY MEDICINE

## 2018-06-08 PROCEDURE — 99214 OFFICE O/P EST MOD 30 MIN: CPT | Performed by: FAMILY MEDICINE

## 2018-06-08 PROCEDURE — 87088 URINE BACTERIA CULTURE: CPT | Performed by: FAMILY MEDICINE

## 2018-06-08 RX ORDER — SULFAMETHOXAZOLE/TRIMETHOPRIM 800-160 MG
1 TABLET ORAL 2 TIMES DAILY
Qty: 14 TABLET | Refills: 0 | Status: SHIPPED | OUTPATIENT
Start: 2018-06-08 | End: 2018-07-24

## 2018-06-08 RX ORDER — VALACYCLOVIR HYDROCHLORIDE 1 G/1
TABLET, FILM COATED ORAL
Qty: 20 TABLET | Refills: 1 | Status: SHIPPED | OUTPATIENT
Start: 2018-06-08 | End: 2018-07-24

## 2018-06-08 NOTE — PROGRESS NOTES
"  SUBJECTIVE:   Bridgett Hardin is a 42 year old female who presents to clinic today for the following health issues:      URINARY TRACT SYMPTOMS      Duration: x 1 week     Description  dysuria and burning     Intensity:  mild    Accompanying signs and symptoms:  Fever/chills: no   Flank pain no   Nausea and vomiting: no   Vaginal symptoms: itching  Abdominal/Pelvic Pain: no     History  History of frequent UTI's: YES  History of kidney stones: no   Sexually Active: YES  Possibility of pregnancy: No    Precipitating or alleviating factors: Improved with warm water    Therapies tried and outcome: cleaning every time   Outcome:       Recently 16 had D&C due to missed , on doxy for a few days after that    Having recurring \"cold sores\" in mouth and lips.  Lesion currently on lower right inner lip. Has never used meds for this in past.    Significant stress over past few months.      Problem list and histories reviewed & adjusted, as indicated.  Additional history: as documented    Patient Active Problem List   Diagnosis     Hypothyroidism, unspecified type     Supervision of high-risk pregnancy of elderly multigravida     Cold sore     Past Surgical History:   Procedure Laterality Date     DILATION AND CURETTAGE SUCTION N/A 2018    Procedure: DILATION AND CURETTAGE SUCTION;  Suction D & C;  Surgeon: Edmond Garvin MD;  Location:  OR       Social History   Substance Use Topics     Smoking status: Never Smoker     Smokeless tobacco: Never Used     Alcohol use No     History reviewed. No pertinent family history.      Current Outpatient Prescriptions   Medication Sig Dispense Refill     sulfamethoxazole-trimethoprim (BACTRIM DS/SEPTRA DS) 800-160 MG per tablet Take 1 tablet by mouth 2 times daily 14 tablet 0     valACYclovir (VALTREX) 1000 mg tablet Take 2 tablets at start of symptoms then 2 more after 12 hours. 20 tablet 1     levothyroxine (SYNTHROID/LEVOTHROID) 50 MCG tablet Take 1 tablet (50 mcg) by " mouth daily 90 tablet 0     BP Readings from Last 3 Encounters:   06/08/18 121/82   05/11/18 125/83   05/09/18 100/71    Wt Readings from Last 3 Encounters:   06/08/18 140 lb 12.8 oz (63.9 kg)   05/09/18 146 lb 11.2 oz (66.5 kg)   05/08/18 147 lb 3.2 oz (66.8 kg)                  Labs reviewed in EPIC    Reviewed and updated as needed this visit by clinical staff  Tobacco  Allergies  Meds  Problems  Med Hx  Surg Hx  Fam Hx  Soc Hx        Reviewed and updated as needed this visit by Provider  Allergies  Meds  Problems         ROS:  Constitutional, HEENT, cardiovascular, pulmonary, gi and gu systems are negative, except as otherwise noted.    OBJECTIVE:     /82  Pulse 100  Temp 99.4  F (37.4  C) (Oral)  Resp 16  Wt 140 lb 12.8 oz (63.9 kg)  LMP 05/11/2018  BMI 28.2 kg/m2  Body mass index is 28.2 kg/(m^2).  GENERAL: healthy, alert and no distress  EYES: Eyes grossly normal to inspection, PERRL and conjunctivae and sclerae normal  HENT: oral mucous membranes moist and ulcered lesion right lower inner lip  ABDOMEN: soft, nontender, no hepatosplenomegaly, no masses and bowel sounds normal  MS: no gross musculoskeletal defects noted, no edema  SKIN: no suspicious lesions or rashes  BACK: no CVA tenderness, no paralumbar tenderness  PSYCH: mentation appears normal, affect normal/bright    Diagnostic Test Results:  none     ASSESSMENT/PLAN:     (N30.01) Acute cystitis with hematuria  (primary encounter diagnosis)  (R30.0) Dysuria  Comment: culture pending  Plan: sulfamethoxazole-trimethoprim (BACTRIM         DS/SEPTRA DS) 800-160 MG per tablet        UA reflex to Microscopic and Culture, Urine         Microscopic, Urine Culture Aerobic Bacterial        Treat with bactrim DS x 7 days due to prolonged symptoms    (B00.1) Cold sore  Comment: unclear if cold sore or aphthous ulcer  Plan: valACYclovir (VALTREX) 1000 mg tablet        Valtrex given and instructed how to take.   If doesn't prevent lesion in  future than treat as aphthous ulcer      See Patient Instructions    Kaykay Wilburn MD  [facility]

## 2018-06-08 NOTE — MR AVS SNAPSHOT
After Visit Summary   6/8/2018    Bridgett Hardin    MRN: [id number]           Patient Information     Date Of Birth          1975        Visit Information        Provider Department      6/8/2018 9:55 AM Kaykay Wilburn MD [facility]        Today's Diagnoses     Acute cystitis with hematuria    -  1    Dysuria        Cold sore           Follow-ups after your visit        Who to contact     If you have questions or need follow up information about today's clinic visit or your schedule please contact [facility] directly at [phone].  Normal or non-critical lab and imaging results will be communicated to you by MyChart, letter or phone within 4 business days after the clinic has received the results. If you do not hear from us within 7 days, please contact the clinic through MyChart or phone. If you have a critical or abnormal lab result, we will notify you by phone as soon as possible.  Submit refill requests through Secret or call your pharmacy and they will forward the refill request to us. Please allow 3 business days for your refill to be completed.          Additional Information About Your Visit        Care EveryWhere ID     This is your Care EveryWhere ID. This could be used by other organizations to access your [place] medical records  [id number]        Your Vitals Were     Pulse Temperature Respirations Last Period BMI (Body Mass Index)       100 99.4  F (37.4  C) (Oral) 16 05/11/2018 28.2 kg/m2        Blood Pressure from Last 3 Encounters:   06/08/18 121/82   05/11/18 125/83   05/09/18 100/71    Weight from Last 3 Encounters:   06/08/18 140 lb 12.8 oz (63.9 kg)   05/09/18 146 lb 11.2 oz (66.5 kg)   05/08/18 147 lb 3.2 oz (66.8 kg)              We Performed the Following     UA reflex to Microscopic and Culture     Urine Culture Aerobic Bacterial     Urine Microscopic          Today's Medication Changes          These changes are accurate as of 6/8/18  10:45 AM.  If you have any questions, ask your nurse or doctor.               Start taking these medicines.        Dose/Directions    sulfamethoxazole-trimethoprim 800-160 MG per tablet   Commonly known as:  BACTRIM DS/SEPTRA DS   Used for:  Acute cystitis with hematuria   Started by:  Kaykay Wilburn MD        Dose:  1 tablet   Take 1 tablet by mouth 2 times daily   Quantity:  14 tablet   Refills:  0       valACYclovir 1000 mg tablet   Commonly known as:  VALTREX   Used for:  Cold sore   Started by:  Kaykay Wilburn MD        Take 2 tablets at start of symptoms then 2 more after 12 hours.   Quantity:  20 tablet   Refills:  1            Where to get your medicines      These medications were sent to [facility] [address] [facility], [facility] [address]  [address] [address] [address]     Phone:  [phone]     sulfamethoxazole-trimethoprim 800-160 MG per tablet    valACYclovir 1000 mg tablet                Primary Care Provider Office Phone # Fax #    America NATAN Andre [phone] [address] [facility] [address]        Equal Access to Services     [facility]: Hadii anu ku hadasho Soomaali, waaxda luqadaha, qaybta kaalmada adeegyada, suresh hayward . So [facility] [phone].    ATENCIÓN: Si habla español, tiene a harrison disposición servicios gratuitos de asistencia lingüística. [facility] [phone].    We comply with applicable federal civil rights laws and [place] laws. We do not discriminate on the basis of race, color, national origin, age, disability, sex, sexual orientation, or gender identity.            Thank you!     Thank you for choosing [facility]  for your care. Our goal is always to provide you with excellent care. Hearing back from our patients is one way we can continue to improve our services. Please take a few minutes to complete the written survey that you may receive in the mail after  your visit with us. Thank you!             Your Updated Medication List - Protect others around you: Learn how to safely use, store and throw away your medicines at www.disposemymeds.org.          This list is accurate as of 6/8/18 10:45 AM.  Always use your most recent med list.                   Brand Name Dispense Instructions for use Diagnosis    levothyroxine 50 MCG tablet    SYNTHROID/LEVOTHROID    90 tablet    Take 1 tablet (50 mcg) by mouth daily    Hypothyroidism, unspecified type       sulfamethoxazole-trimethoprim 800-160 MG per tablet    BACTRIM DS/SEPTRA DS    14 tablet    Take 1 tablet by mouth 2 times daily    Acute cystitis with hematuria       valACYclovir 1000 mg tablet    VALTREX    20 tablet    Take 2 tablets at start of symptoms then 2 more after 12 hours.    Cold sore

## 2018-06-08 NOTE — LETTER
June 11, 2018    Bridgett Hardin  [address] [facility] [address]        Bridgett,    Your urine tests showed a bacteria that should be covered by the antibiotic we have chosen. Please see me if your symptoms persist.    Kaykay Wilburn MD     Results for orders placed or performed in visit on 06/08/18   UA reflex to Microscopic and Culture   Result Value Ref Range    Color Urine Yellow     Appearance Urine Slightly Cloudy     Glucose Urine Negative NEG^Negative mg/dL    Bilirubin Urine Negative NEG^Negative    Ketones Urine Negative NEG^Negative mg/dL    Specific Gravity Urine 1.015 1.003 - 1.035    Blood Urine Trace (A) NEG^Negative    pH Urine 6.0 5.0 - 7.0 pH    Protein Albumin Urine Negative NEG^Negative mg/dL    Urobilinogen Urine 0.2 0.2 - 1.0 EU/dL    Nitrite Urine Negative NEG^Negative    Leukocyte Esterase Urine Moderate (A) NEG^Negative    Source Midstream Urine    Urine Microscopic   Result Value Ref Range    WBC Urine 10-25 (A) OTO5^0 - 5 /HPF    RBC Urine 2-5 (A) OTO2^O - 2 /HPF    Squamous Epithelial /LPF Urine Moderate (A) FEW^Few /LPF   Urine Culture Aerobic Bacterial   Result Value Ref Range    Specimen Description Midstream Urine     Culture Micro (A)      10,000 to 50,000 colonies/mL  Beta hemolytic Streptococcus group B  Beta Hemolytic Streptococcus groups A and B are susceptible to ampicillin, penicillin,   vancomycin, and the cephalosporins.  Susceptibility testing is not routinely done on these   organisms isolated from urine.      Culture Micro       Group B Streptococci are susceptible to ampicillin, penicillin, and cefazolin, but may be   erythromycin and/or clindamycin resistant. Contact Microbiology if your patient is   allergic to penicillin and you need erythromycin and/or clindamycin testing to be   performed.  Clindamycin and Erythromycin are not routinely prescribed for isolates from the urinary   tract.  Susceptibility testing must be requested within 5 days.

## 2018-06-08 NOTE — NURSING NOTE
"Chief Complaint   Patient presents with     Dysuria       Initial /82  Pulse 100  Temp 99.4  F (37.4  C) (Oral)  Resp 16  Wt 140 lb 12.8 oz (63.9 kg)  LMP 05/11/2018  BMI 28.2 kg/m2 Estimated body mass index is 28.2 kg/(m^2) as calculated from the following:    Height as of 4/27/18: 4' 11.25\" (1.505 m).    Weight as of this encounter: 140 lb 12.8 oz (63.9 kg).  Medication Reconciliation: complete  Richie Medrano CMA    "

## 2018-06-10 LAB
BACTERIA SPEC CULT: ABNORMAL
BACTERIA SPEC CULT: ABNORMAL
SPECIMEN SOURCE: ABNORMAL

## 2018-07-24 ENCOUNTER — OFFICE VISIT (OUTPATIENT)
Dept: FAMILY MEDICINE | Facility: CLINIC | Age: 43
End: 2018-07-24
Payer: COMMERCIAL

## 2018-07-24 VITALS
WEIGHT: 139.6 LBS | DIASTOLIC BLOOD PRESSURE: 89 MMHG | SYSTOLIC BLOOD PRESSURE: 143 MMHG | RESPIRATION RATE: 16 BRPM | BODY MASS INDEX: 27.96 KG/M2 | OXYGEN SATURATION: 98 % | TEMPERATURE: 98.4 F | HEART RATE: 76 BPM

## 2018-07-24 DIAGNOSIS — R82.90 NONSPECIFIC FINDING ON EXAMINATION OF URINE: ICD-10-CM

## 2018-07-24 DIAGNOSIS — N89.8 VAGINAL ITCHING: ICD-10-CM

## 2018-07-24 DIAGNOSIS — N30.01 ACUTE CYSTITIS WITH HEMATURIA: Primary | ICD-10-CM

## 2018-07-24 DIAGNOSIS — R39.9 URINARY SYMPTOM OR SIGN: ICD-10-CM

## 2018-07-24 PROBLEM — B18.1 HEPATITIS B, CHRONIC (H): Status: ACTIVE | Noted: 2018-07-24

## 2018-07-24 PROBLEM — B18.2 HEPATITIS C, CHRONIC (H): Status: ACTIVE | Noted: 2018-07-24

## 2018-07-24 LAB
ALBUMIN UR-MCNC: NEGATIVE MG/DL
APPEARANCE UR: CLEAR
BACTERIA #/AREA URNS HPF: ABNORMAL /HPF
BILIRUB UR QL STRIP: NEGATIVE
COLOR UR AUTO: YELLOW
GLUCOSE UR STRIP-MCNC: NEGATIVE MG/DL
HGB UR QL STRIP: ABNORMAL
KETONES UR STRIP-MCNC: NEGATIVE MG/DL
LEUKOCYTE ESTERASE UR QL STRIP: ABNORMAL
NITRATE UR QL: NEGATIVE
NON-SQ EPI CELLS #/AREA URNS LPF: ABNORMAL /LPF
PH UR STRIP: 6.5 PH (ref 5–7)
RBC #/AREA URNS AUTO: ABNORMAL /HPF
SOURCE: ABNORMAL
SP GR UR STRIP: 1.02 (ref 1–1.03)
UROBILINOGEN UR STRIP-ACNC: 0.2 EU/DL (ref 0.2–1)
WBC #/AREA URNS AUTO: ABNORMAL /HPF

## 2018-07-24 PROCEDURE — 87086 URINE CULTURE/COLONY COUNT: CPT | Performed by: NURSE PRACTITIONER

## 2018-07-24 PROCEDURE — 87088 URINE BACTERIA CULTURE: CPT | Performed by: NURSE PRACTITIONER

## 2018-07-24 PROCEDURE — 99213 OFFICE O/P EST LOW 20 MIN: CPT | Performed by: NURSE PRACTITIONER

## 2018-07-24 PROCEDURE — 81001 URINALYSIS AUTO W/SCOPE: CPT | Performed by: NURSE PRACTITIONER

## 2018-07-24 RX ORDER — SULFAMETHOXAZOLE/TRIMETHOPRIM 800-160 MG
1 TABLET ORAL 2 TIMES DAILY
Qty: 6 TABLET | Refills: 0 | Status: SHIPPED | OUTPATIENT
Start: 2018-07-24 | End: 2018-07-26

## 2018-07-24 RX ORDER — FLUCONAZOLE 150 MG/1
150 TABLET ORAL ONCE
Qty: 1 TABLET | Refills: 0 | Status: SHIPPED | OUTPATIENT
Start: 2018-07-24 | End: 2019-02-18

## 2018-07-24 NOTE — NURSING NOTE
"Chief Complaint   Patient presents with     Ent Problem       Initial /89  Pulse 76  Temp 98.4  F (36.9  C) (Oral)  Resp 16  Wt 139 lb 9.6 oz (63.3 kg)  LMP 02/22/2018  SpO2 98%  Breastfeeding? No  BMI 27.96 kg/m2 Estimated body mass index is 27.96 kg/(m^2) as calculated from the following:    Height as of 4/27/18: 4' 11.25\" (1.505 m).    Weight as of this encounter: 139 lb 9.6 oz (63.3 kg).  Medication Reconciliation: complete     Carmen Hicks MA  "

## 2018-07-24 NOTE — PATIENT INSTRUCTIONS
"   * BLADDER INFECTION,Female (Adult)    A bladder infection (\"cystitis\" or \"UTI\") usually causes a constant urge to urinate and a burning when passing urine. Urine may be cloudy, smelly or dark. There may be pain in the lower abdomen. A bladder infection occurs when bacteria from the vaginal area enter the bladder opening (urethra). This can occur from sexual intercourse, wearing tight clothing, dehydration and other factors.  HOME CARE:  1. Drink lots of fluids (at least 6-8 glasses a day, unless you must restrict fluids for other medical reasons). This will force the medicine into your urinary system and flush the bacteria out of your body. Cranberry juice has been shown to help clear out the bacteria.  2. Avoid sexual intercourse until your symptoms are gone.  3. A bladder infection is treated with antibiotics. You may also be given Pyridium (generic = phenazopyridine) to reduce the burning sensation. This medicine will cause your urine to become a bright orange color. The orange urine may stain clothing. You may wear a pad or panty-liner to protect clothing.  PREVENTING FUTURE INFECTIONS:  1. Always wipe from front to back after a bowel movement.  2. Keep the genital area clean and dry.  3. Drink plenty of fluids each day to avoid dehydration.  4. Urinate right after intercourse to flush out the bladder.  5. Wear cotton underwear and cotton-lined panty hose; avoid tight-fitting pants.  6. If you are on birth control pills and are having frequent bladder infections, discuss with your doctor.  FOLLOW UP: Return to this facility or see your doctor if ALL symptoms are not gone after three days of treatment.  GET PROMPT MEDICAL ATTENTION if any of the following occur:    Fever over 101 F (38.3 C)    No improvement by the third day of treatment    Increasing back or abdominal pain    Repeated vomiting; unable to keep medicine down    Weakness, dizziness or fainting    Vaginal discharge    Pain, redness or swelling in " the labia (outer vaginal area)    1246-5416 [employer]. [address] [place] [address], [place] [address]. All rights reserved. This information is not intended as a substitute for professional medical care. Always follow your healthcare professional's instructions.  This information has been modified by your health care provider with permission from the publisher.

## 2018-07-24 NOTE — NURSING NOTE
"Chief Complaint   Patient presents with     Urinary Problem       Initial /89  Pulse 76  Temp 98.4  F (36.9  C) (Oral)  Resp 16  Wt 139 lb 9.6 oz (63.3 kg)  LMP 02/22/2018  SpO2 98%  Breastfeeding? No  BMI 27.96 kg/m2 Estimated body mass index is 27.96 kg/(m^2) as calculated from the following:    Height as of 4/27/18: 4' 11.25\" (1.505 m).    Weight as of this encounter: 139 lb 9.6 oz (63.3 kg).  Medication Reconciliation: complete     Carmen Hicks MA  "

## 2018-07-24 NOTE — MR AVS SNAPSHOT
"              After Visit Summary   7/24/2018    Bridgett Hardin    MRN: [id number]           Patient Information     Date Of Birth          1975        Visit Information        Provider Department      7/24/2018 4:40 PM America Andre NP [facility]        Today's Diagnoses     Urinary symptom or sign    -  1    Hepatitis B, chronic (H)        Acute cystitis with hematuria        Vaginal itching          Care Instructions       * BLADDER INFECTION,Female (Adult)    A bladder infection (\"cystitis\" or \"UTI\") usually causes a constant urge to urinate and a burning when passing urine. Urine may be cloudy, smelly or dark. There may be pain in the lower abdomen. A bladder infection occurs when bacteria from the vaginal area enter the bladder opening (urethra). This can occur from sexual intercourse, wearing tight clothing, dehydration and other factors.  HOME CARE:  1. Drink lots of fluids (at least 6-8 glasses a day, unless you must restrict fluids for other medical reasons). This will force the medicine into your urinary system and flush the bacteria out of your body. Cranberry juice has been shown to help clear out the bacteria.  2. Avoid sexual intercourse until your symptoms are gone.  3. A bladder infection is treated with antibiotics. You may also be given Pyridium (generic = phenazopyridine) to reduce the burning sensation. This medicine will cause your urine to become a bright orange color. The orange urine may stain clothing. You may wear a pad or panty-liner to protect clothing.  PREVENTING FUTURE INFECTIONS:  1. Always wipe from front to back after a bowel movement.  2. Keep the genital area clean and dry.  3. Drink plenty of fluids each day to avoid dehydration.  4. Urinate right after intercourse to flush out the bladder.  5. Wear cotton underwear and cotton-lined panty hose; avoid tight-fitting pants.  6. If you are on birth control pills and are having frequent bladder infections, discuss " with your doctor.  FOLLOW UP: Return to this facility or see your doctor if ALL symptoms are not gone after three days of treatment.  GET PROMPT MEDICAL ATTENTION if any of the following occur:    Fever over 101 F (38.3 C)    No improvement by the third day of treatment    Increasing back or abdominal pain    Repeated vomiting; unable to keep medicine down    Weakness, dizziness or fainting    Vaginal discharge    Pain, redness or swelling in the labia (outer vaginal area)    9558-4364 [employer]. [address] [place] [address]. All rights reserved. This information is not intended as a substitute for professional medical care. Always follow your healthcare professional's instructions.  This information has been modified by your health care provider with permission from the publisher.            Follow-ups after your visit        Follow-up notes from your care team     Return if symptoms worsen or fail to improve.      Who to contact     Normal or non-critical lab and imaging results will be communicated to you by MyChart, letter or phone within 4 business days after the clinic has received the results. If you do not hear from us within 7 days, please contact the clinic through MyChart or phone. If you have a critical or abnormal lab result, we will notify you by phone as soon as possible.  Submit refill requests through [employer] or call your pharmacy and they will forward the refill request to us. Please allow 3 business days for your refill to be completed.          If you need to speak with a  for additional information , please call: [phone]             Additional Information About Your Visit        Care EveryWhere ID     This is your Care EveryWhere ID. This could be used by other organizations to access your [place] medical records  [id number]        Your Vitals Were     Pulse Temperature Respirations Last Period Pulse Oximetry Breastfeeding?    76 98.4  F  (36.9  C) (Oral) 16 02/22/2018 98% No    BMI (Body Mass Index)                   27.96 kg/m2            Blood Pressure from Last 3 Encounters:   07/24/18 143/89   06/08/18 121/82   05/11/18 125/83    Weight from Last 3 Encounters:   07/24/18 139 lb 9.6 oz (63.3 kg)   06/08/18 140 lb 12.8 oz (63.9 kg)   05/09/18 146 lb 11.2 oz (66.5 kg)              We Performed the Following     UA reflex to Microscopic and Culture     Urine Microscopic          Today's Medication Changes          These changes are accurate as of 7/24/18  5:01 PM.  If you have any questions, ask your nurse or doctor.               Start taking these medicines.        Dose/Directions    fluconazole 150 MG tablet   Commonly known as:  DIFLUCAN   Used for:  Vaginal itching   Started by:  America Andre NP        Dose:  150 mg   Take 1 tablet (150 mg) by mouth once for 1 dose   Quantity:  1 tablet   Refills:  0       sulfamethoxazole-trimethoprim 800-160 MG per tablet   Commonly known as:  BACTRIM DS/SEPTRA DS   Used for:  Acute cystitis with hematuria   Started by:  America Andre NP        Dose:  1 tablet   Take 1 tablet by mouth 2 times daily for 3 days   Quantity:  6 tablet   Refills:  0            Where to get your medicines      These medications were sent to [facility] [address] [place] [address] RUPALI LOPEZ - [address] AT [facility] [address]  [address], [place] [address]     Phone:  [phone]     fluconazole 150 MG tablet    sulfamethoxazole-trimethoprim 800-160 MG per tablet                Primary Care Provider Office Phone # Fax #    America Andre [phone] [address] [facility]  [place] [address]        Equal Access to Services     [facility] CAROLINA AH: Keyanna Cuevas, waaxda luqadaha, qaybta kaalmada duaneyaladan, suresh yang. So [facility] [phone].    ATENCIÓN: Si maryla español, tiene a harrison disposición servicios gratuitos de asistencia lingüística.  Arianna guzman [phone].    We comply with applicable federal civil rights laws and [place] laws. We do not discriminate on the basis of race, color, national origin, age, disability, sex, sexual orientation, or gender identity.            Thank you!     Thank you for choosing [facility]  for your care. Our goal is always to provide you with excellent care. Hearing back from our patients is one way we can continue to improve our services. Please take a few minutes to complete the written survey that you may receive in the mail after your visit with us. Thank you!             Your Updated Medication List - Protect others around you: Learn how to safely use, store and throw away your medicines at www.disposemymeds.org.          This list is accurate as of 7/24/18  5:01 PM.  Always use your most recent med list.                   Brand Name Dispense Instructions for use Diagnosis    fluconazole 150 MG tablet    DIFLUCAN    1 tablet    Take 1 tablet (150 mg) by mouth once for 1 dose    Vaginal itching       levothyroxine 50 MCG tablet    SYNTHROID/LEVOTHROID    90 tablet    Take 1 tablet (50 mcg) by mouth daily    Hypothyroidism, unspecified type       sulfamethoxazole-trimethoprim 800-160 MG per tablet    BACTRIM DS/SEPTRA DS    6 tablet    Take 1 tablet by mouth 2 times daily for 3 days    Acute cystitis with hematuria

## 2018-07-24 NOTE — PROGRESS NOTES
SUBJECTIVE:   Bridgett Hardin is a 43 year old female who presents to clinic today for the following health issues:      Urinary SYMPTOMS     Onset: 1 month ago    Description:   Painful urination (Dysuria): no   Blood in urine (Hematuria): no   Frequency/Urgency: YES  Abdominal/Pelvic/Flank Pain : no     Other vaginal symptoms: vaginal itching and burning    Progression of Symptoms:  same    History:   History of frequent UTI's: no   History of kidney stones: no   Sexually Active: no   New Partner: no     Possibility of pregnancy: No     Therapies Tried and outcome: none       Problem list and histories reviewed & adjusted, as indicated.  Additional history: as documented    Patient Active Problem List   Diagnosis     Hypothyroidism, unspecified type     Supervision of high-risk pregnancy of elderly multigravida     Cold sore     Hepatitis B, chronic (H)     Hepatitis C, chronic (H)     Past Surgical History:   Procedure Laterality Date     DILATION AND CURETTAGE SUCTION N/A 5/11/2018    Procedure: DILATION AND CURETTAGE SUCTION;  Suction D & C;  Surgeon: Edmond Garvin MD;  Location:  OR       Social History   Substance Use Topics     Smoking status: Never Smoker     Smokeless tobacco: Never Used     Alcohol use No     History reviewed. No pertinent family history.      Current Outpatient Prescriptions   Medication Sig Dispense Refill     fluconazole (DIFLUCAN) 150 MG tablet Take 1 tablet (150 mg) by mouth once for 1 dose 1 tablet 0     levothyroxine (SYNTHROID/LEVOTHROID) 50 MCG tablet Take 1 tablet (50 mcg) by mouth daily 90 tablet 0     sulfamethoxazole-trimethoprim (BACTRIM DS/SEPTRA DS) 800-160 MG per tablet Take 1 tablet by mouth 2 times daily for 3 days 6 tablet 0     No Known Allergies  BP Readings from Last 3 Encounters:   07/24/18 143/89   06/08/18 121/82   05/11/18 125/83    Wt Readings from Last 3 Encounters:   07/24/18 139 lb 9.6 oz (63.3 kg)   06/08/18 140 lb 12.8 oz (63.9 kg)   05/09/18 146 lb  11.2 oz (66.5 kg)                  Labs reviewed in EPIC    Reviewed and updated as needed this visit by clinical staff  Tobacco  Allergies  Meds  Med Hx  Surg Hx  Fam Hx  Soc Hx      Reviewed and updated as needed this visit by Provider         ROS:  Constitutional, HEENT, cardiovascular, pulmonary, GI, , musculoskeletal, neuro, skin, endocrine and psych systems are negative, except as otherwise noted.    OBJECTIVE:     /89  Pulse 76  Temp 98.4  F (36.9  C) (Oral)  Resp 16  Wt 139 lb 9.6 oz (63.3 kg)  LMP 02/22/2018  SpO2 98%  Breastfeeding? No  BMI 27.96 kg/m2  Body mass index is 27.96 kg/(m^2).  GENERAL: healthy, alert and no distress  RESP: lungs clear to auscultation - no rales, rhonchi or wheezes  CV: regular rate and rhythm, normal S1 S2, no S3 or S4, no murmur, click or rub, no peripheral edema and peripheral pulses strong  ABDOMEN: soft, nontender, no hepatosplenomegaly, no masses and bowel sounds normal, no CVA tenderness  : deferred per pt  SKIN: no suspicious rashes  PSYCH: mentation appears normal, affect normal/bright    Diagnostic Test Results:  See orders    ASSESSMENT/PLAN:         ICD-10-CM    1. Acute cystitis with hematuria N30.01 sulfamethoxazole-trimethoprim (BACTRIM DS/SEPTRA DS) 800-160 MG per tablet   2. Urinary symptom or sign R39.9 UA reflex to Microscopic and Culture     Urine Microscopic   3. Vaginal itching L29.8 fluconazole (DIFLUCAN) 150 MG tablet   4. Nonspecific finding on examination of urine R82.90 Urine Culture Aerobic Bacterial       See Patient Instructions: take full course of ABX, follow up if symptoms persist or worsen.     America Andre, P  [facility]

## 2018-07-26 DIAGNOSIS — N30.01 ACUTE CYSTITIS WITH HEMATURIA: Primary | ICD-10-CM

## 2018-07-26 LAB
BACTERIA SPEC CULT: ABNORMAL
SPECIMEN SOURCE: ABNORMAL

## 2018-07-26 RX ORDER — PENICILLIN V POTASSIUM 500 MG/1
500 TABLET, FILM COATED ORAL 2 TIMES DAILY
Qty: 20 TABLET | Refills: 0 | Status: SHIPPED | OUTPATIENT
Start: 2018-07-26 | End: 2018-11-02

## 2018-07-26 NOTE — PROGRESS NOTES
Please call pt: urine culture shows that we should change antibiotics.  This has been sent to their pharmacy.  Take full course of antibiotics.  Follow up if needed for worsening symptoms.     EDITH Frank

## 2018-08-23 ENCOUNTER — OFFICE VISIT (OUTPATIENT)
Dept: FAMILY MEDICINE | Facility: CLINIC | Age: 43
End: 2018-08-23
Payer: COMMERCIAL

## 2018-08-23 VITALS
OXYGEN SATURATION: 97 % | SYSTOLIC BLOOD PRESSURE: 125 MMHG | RESPIRATION RATE: 16 BRPM | HEART RATE: 96 BPM | WEIGHT: 139 LBS | DIASTOLIC BLOOD PRESSURE: 84 MMHG | BODY MASS INDEX: 27.84 KG/M2 | TEMPERATURE: 98.1 F

## 2018-08-23 DIAGNOSIS — N76.0 VAGINITIS AND VULVOVAGINITIS: ICD-10-CM

## 2018-08-23 DIAGNOSIS — R82.90 NONSPECIFIC FINDING ON EXAMINATION OF URINE: ICD-10-CM

## 2018-08-23 DIAGNOSIS — R30.0 DYSURIA: Primary | ICD-10-CM

## 2018-08-23 LAB
ALBUMIN UR-MCNC: NEGATIVE MG/DL
APPEARANCE UR: CLEAR
BACTERIA #/AREA URNS HPF: ABNORMAL /HPF
BILIRUB UR QL STRIP: NEGATIVE
COLOR UR AUTO: YELLOW
GLUCOSE UR STRIP-MCNC: NEGATIVE MG/DL
HGB UR QL STRIP: ABNORMAL
KETONES UR STRIP-MCNC: NEGATIVE MG/DL
LEUKOCYTE ESTERASE UR QL STRIP: ABNORMAL
MUCOUS THREADS #/AREA URNS LPF: PRESENT /LPF
NITRATE UR QL: POSITIVE
NON-SQ EPI CELLS #/AREA URNS LPF: ABNORMAL /LPF
PH UR STRIP: 6 PH (ref 5–7)
RBC #/AREA URNS AUTO: ABNORMAL /HPF
SOURCE: ABNORMAL
SP GR UR STRIP: <=1.005 (ref 1–1.03)
SPECIMEN SOURCE: NORMAL
UROBILINOGEN UR STRIP-ACNC: 0.2 EU/DL (ref 0.2–1)
WBC #/AREA URNS AUTO: ABNORMAL /HPF
WET PREP SPEC: NORMAL

## 2018-08-23 PROCEDURE — 99214 OFFICE O/P EST MOD 30 MIN: CPT | Performed by: FAMILY MEDICINE

## 2018-08-23 PROCEDURE — 87088 URINE BACTERIA CULTURE: CPT | Performed by: FAMILY MEDICINE

## 2018-08-23 PROCEDURE — 87210 SMEAR WET MOUNT SALINE/INK: CPT | Performed by: FAMILY MEDICINE

## 2018-08-23 PROCEDURE — 87591 N.GONORRHOEAE DNA AMP PROB: CPT | Performed by: FAMILY MEDICINE

## 2018-08-23 PROCEDURE — 87086 URINE CULTURE/COLONY COUNT: CPT | Performed by: FAMILY MEDICINE

## 2018-08-23 PROCEDURE — 87491 CHLMYD TRACH DNA AMP PROBE: CPT | Performed by: FAMILY MEDICINE

## 2018-08-23 PROCEDURE — 87529 HSV DNA AMP PROBE: CPT | Performed by: FAMILY MEDICINE

## 2018-08-23 PROCEDURE — 81001 URINALYSIS AUTO W/SCOPE: CPT | Performed by: FAMILY MEDICINE

## 2018-08-23 PROCEDURE — 87186 SC STD MICRODIL/AGAR DIL: CPT | Performed by: FAMILY MEDICINE

## 2018-08-23 RX ORDER — CEPHALEXIN 500 MG/1
500 CAPSULE ORAL 2 TIMES DAILY
Qty: 14 CAPSULE | Refills: 0 | Status: SHIPPED | OUTPATIENT
Start: 2018-08-23 | End: 2019-02-18

## 2018-08-23 ASSESSMENT — PAIN SCALES - GENERAL: PAINLEVEL: SEVERE PAIN (7)

## 2018-08-23 NOTE — LETTER
[facility]  [address] [facility] [address] [phone]          August 26, 2018    Bridgett Wilfred                                                                                                                     [address] [place] [address]            Dear Bridgett,     Dear Bridgett, here are your recent results:   Your tests are negative for any vaginal infection. You do have another bladder infection, the antibiotic you are currently taking cephalexin should be effective.   Please follow up with your primary care provider if your symptoms persist.       Take care       Valery Doherty M.D.                                               Results for orders placed or performed in visit on 08/23/18   *UA reflex to Microscopic and Culture ([place] [facility] (except [place] and [place])   Result Value Ref Range    Color Urine Yellow     Appearance Urine Clear     Glucose Urine Negative NEG^Negative mg/dL    Bilirubin Urine Negative NEG^Negative    Ketones Urine Negative NEG^Negative mg/dL    Specific Gravity Urine <=1.005 1.003 - 1.035    Blood Urine Trace (A) NEG^Negative    pH Urine 6.0 5.0 - 7.0 pH    Protein Albumin Urine Negative NEG^Negative mg/dL    Urobilinogen Urine 0.2 0.2 - 1.0 EU/dL    Nitrite Urine Positive (A) NEG^Negative    Leukocyte Esterase Urine Small (A) NEG^Negative    Source Midstream Urine    Urine Microscopic   Result Value Ref Range    WBC Urine 25-50 (A) OTO5^0 - 5 /HPF    RBC Urine 2-5 (A) OTO2^O - 2 /HPF    Squamous Epithelial /LPF Urine Few FEW^Few /LPF    Bacteria Urine Moderate (A) NEG^Negative /HPF    Mucous Urine Present (A) NEG^Negative /LPF   Urine Culture Aerobic Bacterial   Result Value Ref Range    Specimen Description Midstream Urine     Culture Micro >100,000 colonies/mL  Escherichia coli   (A)        Susceptibility    Escherichia coli - BRENDA     AMPICILLIN >=32 Resistant ug/mL     CEFAZOLIN* <=4 Sensitive ug/mL      * Cefazolin BRENDA  breakpoints are for the treatment of uncomplicated urinary tract infections.  For the treatment of systemic infections, please contact the laboratory for additional testing.     CEFOXITIN <=4 Sensitive ug/mL     CEFTAZIDIME <=1 Sensitive ug/mL     CEFTRIAXONE <=1 Sensitive ug/mL     CIPROFLOXACIN <=0.25 Sensitive ug/mL     GENTAMICIN <=1 Sensitive ug/mL     LEVOFLOXACIN <=0.12 Sensitive ug/mL     NITROFURANTOIN <=16 Sensitive ug/mL     TOBRAMYCIN <=1 Sensitive ug/mL     Trimethoprim/Sulfa >=16/304 Resistant ug/mL     AMPICILLIN/SULBACTAM >=32 Resistant ug/mL     Piperacillin/Tazo <=4 Sensitive ug/mL     CEFEPIME <=1 Sensitive ug/mL   Wet prep   Result Value Ref Range    Specimen Description Vagina     Wet Prep No Trichomonas seen     Wet Prep No clue cells seen     Wet Prep No yeast seen     Wet Prep (Note)  SELF COLLECT.      NEISSERIA GONORRHOEA PCR   Result Value Ref Range    Specimen Descrip Vagina     N Gonorrhea PCR Negative NEG^Negative   CHLAMYDIA TRACHOMATIS PCR   Result Value Ref Range    Specimen Description Vagina     Chlamydia Trachomatis PCR Negative NEG^Negative   HSV 1 and 2 DNA by PCR   Result Value Ref Range    HSV Specimen Type Vagina     HSV Type 1 PCR Negative NEG^Negative    HSV Type 2 PCR Negative NEG^Negative

## 2018-08-23 NOTE — PROGRESS NOTES
SUBJECTIVE:   Bridgett Hardin is a 43 year old female who presents to clinic today for the following health issues:      URINARY TRACT SYMPTOMS      Duration: 1 month    Description  dysuria, frequency and urgency    Intensity:  severe    Accompanying signs and symptoms:  Fever/chills: no   Flank pain no   Nausea and vomiting: no   Vaginal symptoms: some itching   Abdominal/Pelvic Pain: no     History  History of frequent UTI's: YES  History of kidney stones: no   Sexually Active: YES  Possibility of pregnancy: No    Precipitating or alleviating factors: None    Therapies tried and outcome: course of antibiotics - twice   Outcome: recurrence    Patient had surgery D&C in 5/2018    Since then has had recurrent uti's.had 2 episodes of positive urine culture for group B strep  Possibility of pregnancy: No  Pelvic pain: No  Concern about STD exposure, unprotected sex, or high risk sexual behavior: No  Problem list and histories reviewed & adjusted, as indicated.  Additional history: as documented    Problem list, Medication list, Allergies, and Medical/Social/Surgical histories reviewed in EPIC and updated as appropriate.    ROS:  Constitutional, HEENT, cardiovascular, pulmonary, gi and gu systems are negative, except as otherwise noted.    OBJECTIVE:                                                    /84  Pulse 96  Temp 98.1  F (36.7  C) (Oral)  Resp 16  Wt 139 lb (63 kg)  LMP 02/22/2018  SpO2 97%  Breastfeeding? No  BMI 27.84 kg/m2  Body mass index is 27.84 kg/(m^2).  GENERAL: healthy, alert and no distress  EYES: Eyes grossly normal to inspection, PERRL and conjunctivae and sclerae normal  NECK: no adenopathy, no asymmetry, masses, or scars and thyroid normal to palpation  RESP: lungs clear to auscultation - no rales, rhonchi or wheezes  CV: regular rate and rhythm, normal S1 S2, no S3 or S4, no murmur, click or rub, no peripheral edema and peripheral pulses strong  ABDOMEN: soft, nontender, no  hepatosplenomegaly, no masses and bowel sounds normal   (female): female external genitalia appeared some dryness and an area of erythema swabbed for HSV , normal urethral meatus, vaginal mucosa, normal cervix/adnexa/uterus without masses, No cervical motion tenderness  Vaginal discharge appeared: normal   MS: no gross musculoskeletal defects noted, no edema  SKIN: no suspicious lesions or rashes  NEURO: Normal strength and tone, mentation intact and speech normal  PSYCH: mentation appears normal, affect normal/bright    Diagnostic Test Results:  Results for orders placed or performed in visit on 08/23/18 (from the past 24 hour(s))   *UA reflex to Microscopic and Culture ([facility] (except [place] and [place])   Result Value Ref Range    Color Urine Yellow     Appearance Urine Clear     Glucose Urine Negative NEG^Negative mg/dL    Bilirubin Urine Negative NEG^Negative    Ketones Urine Negative NEG^Negative mg/dL    Specific Gravity Urine <=1.005 1.003 - 1.035    Blood Urine Trace (A) NEG^Negative    pH Urine 6.0 5.0 - 7.0 pH    Protein Albumin Urine Negative NEG^Negative mg/dL    Urobilinogen Urine 0.2 0.2 - 1.0 EU/dL    Nitrite Urine Positive (A) NEG^Negative    Leukocyte Esterase Urine Small (A) NEG^Negative    Source Midstream Urine    Urine Microscopic   Result Value Ref Range    WBC Urine 25-50 (A) OTO5^0 - 5 /HPF    RBC Urine 2-5 (A) OTO2^O - 2 /HPF    Squamous Epithelial /LPF Urine Few FEW^Few /LPF    Bacteria Urine Moderate (A) NEG^Negative /HPF    Mucous Urine Present (A) NEG^Negative /LPF   Wet prep   Result Value Ref Range    Specimen Description Vagina     Wet Prep No Trichomonas seen     Wet Prep No clue cells seen     Wet Prep No yeast seen     Wet Prep (Note)  SELF COLLECT.           ASSESSMENT/PLAN:                                                        ICD-10-CM    1. Dysuria R30.0 *UA reflex to Microscopic and Culture ([place] [facility] (except [place] and  [place])     Urine Microscopic     cephALEXin (KEFLEX) 500 MG capsule   2. Nonspecific finding on examination of urine R82.90 Urine Culture Aerobic Bacterial   3. Vaginitis and vulvovaginitis N76.0 Wet prep     NEISSERIA GONORRHOEA PCR     CHLAMYDIA TRACHOMATIS PCR     HSV 1 and 2 DNA by PCR     Prescribed with keflex  Patient with hematuria advised a repeat urinalysis in 6 weeks is needed to make sure hematuria is resolved. If persistent aware will need further evaluation to rule out possibility of stones or tumors. Patient will make lab appointment    Wet prep negative. HSV GC/CT pending to evaluate vaginitis symptoms of itching and discomfort  Some dryness noted on vaginal mucosa- may consider over the counter lubricants or replens. Await results first.    Recommend follow up with primary care provider if no relief, sooner if worse  Adverse reactions of medications discussed.  Over the counter medications discussed.   Aware to come back in if with worsening symptoms or if no relief despite treatment plan  Patient voiced understanding and had no further questions.     Valery Doherty MD    See Patient Instructions    Valery Doherty MD  [facility]

## 2018-08-23 NOTE — MR AVS SNAPSHOT
After Visit Summary   8/23/2018    Bridgett Hardin    MRN: [id number]           Patient Information     Date Of Birth          1975        Visit Information        Provider Department      8/23/2018 3:40 PM Valery Doherty MD [facility]        Today's Diagnoses     Dysuria    -  1    Nonspecific finding on examination of urine        Vaginitis and vulvovaginitis           Follow-ups after your visit        Who to contact     If you have questions or need follow up information about today's clinic visit or your schedule please contact [facility] directly at [phone].  Normal or non-critical lab and imaging results will be communicated to you by MyChart, letter or phone within 4 business days after the clinic has received the results. If you do not hear from us within 7 days, please contact the clinic through MyChart or phone. If you have a critical or abnormal lab result, we will notify you by phone as soon as possible.  Submit refill requests through [employer] or call your pharmacy and they will forward the refill request to us. Please allow 3 business days for your refill to be completed.          Additional Information About Your Visit        Care EveryWhere ID     This is your Care EveryWhere ID. This could be used by other organizations to access your [place] medical records  [id number]        Your Vitals Were     Pulse Temperature Respirations Last Period Pulse Oximetry Breastfeeding?    96 98.1  F (36.7  C) (Oral) 16 02/22/2018 97% No    BMI (Body Mass Index)                   27.84 kg/m2            Blood Pressure from Last 3 Encounters:   08/23/18 125/84   07/24/18 143/89   06/08/18 121/82    Weight from Last 3 Encounters:   08/23/18 139 lb (63 kg)   07/24/18 139 lb 9.6 oz (63.3 kg)   06/08/18 140 lb 12.8 oz (63.9 kg)              We Performed the Following     *UA reflex to Microscopic and Culture ([place] [facility] (except [place] and  [place])     CHLAMYDIA TRACHOMATIS PCR     HSV 1 and 2 DNA by PCR     NEISSERIA GONORRHOEA PCR     Urine Culture Aerobic Bacterial     Urine Microscopic     Wet prep          Today's Medication Changes          These changes are accurate as of 8/23/18 11:59 PM.  If you have any questions, ask your nurse or doctor.               Start taking these medicines.        Dose/Directions    cephALEXin 500 MG capsule   Commonly known as:  KEFLEX   Used for:  Dysuria   Started by:  Valery Doherty MD        Dose:  500 mg   Take 1 capsule (500 mg) by mouth 2 times daily for 7 days   Quantity:  14 capsule   Refills:  0            Where to get your medicines      These medications were sent to [facility] [address] [facility] [place] [address] [facility] [address]  [address] [place] [address]     Phone:  [phone]     cephALEXin 500 MG capsule                Primary Care Provider Office Phone # Fax #    America EasleyNATAN sampson [phone]       [address]  [place] [address]        Equal Access to Services     [facility]: Hadii aad ku hadasho Soomaali, waaxda luqadaha, qaybta kaalmada adeegyada, waxay jonasin haytarin duane hayward . So [facility] [phone].    ATENCIÓN: Si habla español, tiene a harrison disposición servicios gratuitos de asistencia lingüística. Llame al [phone].    We comply with applicable federal civil rights laws and [place] laws. We do not discriminate on the basis of race, color, national origin, age, disability, sex, sexual orientation, or gender identity.            Thank you!     Thank you for choosing [facility]  [facility]. Our goal is always to provide you with excellent care. Hearing back from our patients is one way we can continue to improve our services. Please take a few minutes to complete the written survey that you may receive in the mail after your visit with us. Thank you!             Your Updated  Medication List - Protect others around you: Learn how to safely use, store and throw away your medicines at www.disposemymeds.org.          This list is accurate as of 8/23/18 11:59 PM.  Always use your most recent med list.                   Brand Name Dispense Instructions for use Diagnosis    cephALEXin 500 MG capsule    KEFLEX    14 capsule    Take 1 capsule (500 mg) by mouth 2 times daily for 7 days    Dysuria       levothyroxine 50 MCG tablet    SYNTHROID/LEVOTHROID    90 tablet    Take 1 tablet (50 mcg) by mouth daily    Hypothyroidism, unspecified type       penicillin V potassium 500 MG tablet    VEETID    20 tablet    Take 1 tablet (500 mg) by mouth 2 times daily    Acute cystitis with hematuria

## 2018-08-24 LAB
C TRACH DNA SPEC QL NAA+PROBE: NEGATIVE
HSV1 DNA SPEC QL NAA+PROBE: NEGATIVE
HSV2 DNA SPEC QL NAA+PROBE: NEGATIVE
N GONORRHOEA DNA SPEC QL NAA+PROBE: NEGATIVE
SPECIMEN SOURCE: NORMAL

## 2018-08-25 LAB
BACTERIA SPEC CULT: ABNORMAL
SPECIMEN SOURCE: ABNORMAL

## 2018-09-18 DIAGNOSIS — E03.9 HYPOTHYROIDISM, UNSPECIFIED TYPE: ICD-10-CM

## 2018-09-19 ENCOUNTER — TELEPHONE (OUTPATIENT)
Dept: FAMILY MEDICINE | Facility: CLINIC | Age: 43
End: 2018-09-19

## 2018-09-19 RX ORDER — LEVOTHYROXINE SODIUM 50 UG/1
TABLET ORAL
Qty: 90 TABLET | Refills: 0 | Status: SHIPPED | OUTPATIENT
Start: 2018-09-19 | End: 2018-11-02

## 2018-09-19 NOTE — TELEPHONE ENCOUNTER
"Requested Prescriptions   Pending Prescriptions Disp Refills     levothyroxine (SYNTHROID/LEVOTHROID) 50 MCG tablet [Pharmacy Med Name: LEVOTHYROXINE 0.05MG (50MCG) TAB] 90 tablet 0    Last Written Prescription Date:  08/13/18  Last Fill Quantity: 90,  # refills: 0   Last office visit: 07/24/2018 with prescribing provider:  SURYA Andre Future Office Visit:     Sig: TAKE 1 TABLET(50 MCG) BY MOUTH DAILY    Thyroid Protocol Failed    9/18/2018  3:56 PM       Failed - No positive pregnancy test in past 12 months    If patient is pregnant or has had a positive pregnancy test, please check TSH.         Passed - Patient is 12 years or older       Passed - Recent (12 mo) or future (30 days) visit within the authorizing provider's specialty    Patient had office visit in the last 12 months or has a visit in the next 30 days with authorizing provider or within the authorizing provider's specialty.  See \"Patient Info\" tab in inbasket, or \"Choose Columns\" in Meds & Orders section of the refill encounter.           Passed - Normal TSH on file in past 12 months    Recent Labs   Lab Test  04/16/18   1111   TSH  3.16             Passed - No active pregnancy on record    If patient is pregnant or has had a positive pregnancy test, please check TSH.            "

## 2018-09-19 NOTE — TELEPHONE ENCOUNTER
Routing refill request to provider for review/approval because:  Patient currently pregnant.    Cara Recinos RN on 9/19/2018 at 11:45 AM

## 2018-11-02 ENCOUNTER — OFFICE VISIT (OUTPATIENT)
Dept: FAMILY MEDICINE | Facility: CLINIC | Age: 43
End: 2018-11-02
Payer: COMMERCIAL

## 2018-11-02 VITALS
HEART RATE: 66 BPM | WEIGHT: 136 LBS | BODY MASS INDEX: 27.42 KG/M2 | SYSTOLIC BLOOD PRESSURE: 124 MMHG | HEIGHT: 59 IN | OXYGEN SATURATION: 99 % | DIASTOLIC BLOOD PRESSURE: 86 MMHG | RESPIRATION RATE: 16 BRPM

## 2018-11-02 DIAGNOSIS — E03.9 HYPOTHYROIDISM, UNSPECIFIED TYPE: Primary | ICD-10-CM

## 2018-11-02 DIAGNOSIS — B18.2 CHRONIC HEPATITIS C WITHOUT HEPATIC COMA (H): ICD-10-CM

## 2018-11-02 DIAGNOSIS — B18.1 HEPATITIS B, CHRONIC (H): ICD-10-CM

## 2018-11-02 LAB
ALBUMIN SERPL-MCNC: 3.8 G/DL (ref 3.4–5)
ALP SERPL-CCNC: 115 U/L (ref 40–150)
ALT SERPL W P-5'-P-CCNC: 26 U/L (ref 0–50)
AST SERPL W P-5'-P-CCNC: 19 U/L (ref 0–45)
BILIRUB DIRECT SERPL-MCNC: <0.1 MG/DL (ref 0–0.2)
BILIRUB SERPL-MCNC: 0.3 MG/DL (ref 0.2–1.3)
PROT SERPL-MCNC: 8.1 G/DL (ref 6.8–8.8)
TSH SERPL DL<=0.005 MIU/L-ACNC: 3.71 MU/L (ref 0.4–4)

## 2018-11-02 PROCEDURE — 86706 HEP B SURFACE ANTIBODY: CPT | Performed by: PHYSICIAN ASSISTANT

## 2018-11-02 PROCEDURE — 80076 HEPATIC FUNCTION PANEL: CPT | Performed by: PHYSICIAN ASSISTANT

## 2018-11-02 PROCEDURE — 86803 HEPATITIS C AB TEST: CPT | Performed by: PHYSICIAN ASSISTANT

## 2018-11-02 PROCEDURE — 36415 COLL VENOUS BLD VENIPUNCTURE: CPT | Performed by: PHYSICIAN ASSISTANT

## 2018-11-02 PROCEDURE — 99213 OFFICE O/P EST LOW 20 MIN: CPT | Performed by: PHYSICIAN ASSISTANT

## 2018-11-02 PROCEDURE — 84443 ASSAY THYROID STIM HORMONE: CPT | Performed by: PHYSICIAN ASSISTANT

## 2018-11-02 PROCEDURE — 87341 HEP B SURFACE AG NEUTRLZJ IA: CPT | Performed by: PHYSICIAN ASSISTANT

## 2018-11-02 PROCEDURE — 87340 HEPATITIS B SURFACE AG IA: CPT | Performed by: PHYSICIAN ASSISTANT

## 2018-11-02 RX ORDER — LEVOTHYROXINE SODIUM 50 UG/1
TABLET ORAL
Qty: 90 TABLET | Refills: 3 | Status: SHIPPED | OUTPATIENT
Start: 2018-11-02 | End: 2019-07-26

## 2018-11-02 NOTE — MR AVS SNAPSHOT
"              After Visit Summary   2018    Bridgett Hardin    MRN: [id number]           Patient Information     Date Of Birth          1975        Visit Information        Provider Department      2018 1:40 PM Roberth Trevizo PA-C [facility] [place]        Today's Diagnoses     Hypothyroidism, unspecified type    -  1    Immunization due        Hepatitis B, chronic (H)        Chronic hepatitis C without hepatic coma (H)           Follow-ups after your visit        Who to contact     Normal or non-critical lab and imaging results will be communicated to you by [employer], letter or phone within 4 business days after the clinic has received the results. If you do not hear from us within 7 days, please contact the clinic through [employer] or phone. If you have a critical or abnormal lab result, we will notify you by phone as soon as possible.  Submit refill requests through [employer] or call your pharmacy and they will forward the refill request to us. Please allow 3 business days for your refill to be completed.          If you need to speak with a  for additional information , please call: [phone]             Additional Information About Your Visit        [employer] Information     [employer] lets you send messages to your doctor, view your test results, renew your prescriptions, schedule appointments and more. To sign up, go to [email or web address] . Click on \"Log in\" on the left side of the screen, which will take you to the Welcome page. Then click on \"Sign up Now\" on the right side of the page.     You will be asked to enter the access code listed below, as well as some personal information. Please follow the directions to create your username and password.     Your access code is: [email or web address]  Expires: 2019  2:09 PM     Your access code will  in 90 days. If you need help or a new code, please call your [facility] or [phone].        Care EveryWhere ID     This " "is your Care EveryWhere ID. This could be used by other organizations to access your [place] medical records  [id number]        Your Vitals Were     Pulse Respirations Height Last Period Pulse Oximetry BMI (Body Mass Index)    66 16 4' 11\" (1.499 m) 02/22/2018 99% 27.47 kg/m2       Blood Pressure from Last 3 Encounters:   11/02/18 124/86   08/23/18 125/84   07/24/18 143/89    Weight from Last 3 Encounters:   11/02/18 136 lb (61.7 kg)   08/23/18 139 lb (63 kg)   07/24/18 139 lb 9.6 oz (63.3 kg)              We Performed the Following     **Hepatitis C Screen Reflex to RNA FUTURE anytime     ADMIN 1st VACCINE     FLU VAC PRESRV FREE QUAD SPLIT VIR, IM (3+ YRS)     Hepatic panel     Hepatitis B Surface Antibody     Hepatitis B surface antigen     TSH with free T4 reflex          Today's Medication Changes          These changes are accurate as of 11/2/18  2:10 PM.  If you have any questions, ask your nurse or doctor.               Stop taking these medicines if you haven't already. Please contact your care team if you have questions.     penicillin V potassium 500 MG tablet   Commonly known as:  VEETID   Stopped by:  Roberth Trevizo PA-C                Where to get your medicines      Some of these will need a paper prescription and others can be bought over the counter.  Ask your nurse if you have questions.     Bring a paper prescription for each of these medications     levothyroxine 50 MCG tablet                Primary Care Provider Office Phone # Fax #    America Andre [phone]       [address] [address] [facility] [address] [facility] [address] [facility] [address]        Equal Access to Services     [facility]: Hadii aad ku hadasho Soomaali, waaxda luqadaha, qaybta kaalmada adeegyada, suresh yang. So [facility] [phone].    ATENCIÓN: Si habla español, tiene a harrison disposición servicios gratuitos de asistencia lingüística. Llame al [phone].    We comply with applicable federal civil rights laws " and [place] laws. We do not discriminate on the basis of race, color, national origin, age, disability, sex, sexual orientation, or gender identity.            Thank you!     Thank you for choosing [facility]  for your care. Our goal is always to provide you with excellent care. Hearing back from our patients is one way we can continue to improve our services. Please take a few minutes to complete the written survey that you may receive in the mail after your visit with us. Thank you!             Your Updated Medication List - Protect others around you: Learn how to safely use, store and throw away your medicines at www.disposemymeds.org.          This list is accurate as of 11/2/18  2:10 PM.  Always use your most recent med list.                   Brand Name Dispense Instructions for use Diagnosis    levothyroxine 50 MCG tablet    SYNTHROID/LEVOTHROID    90 tablet    TAKE 1 TABLET(50 MCG) BY MOUTH DAILY    Hypothyroidism, unspecified type

## 2018-11-02 NOTE — PROGRESS NOTES
SUBJECTIVE:   Bridgett Hardin is a 43 year old female who presents to clinic today for the following health issues:      Hypothyroidism Follow-up      Since last visit, patient describes the following symptoms: Weight stable, no hair loss, no skin changes, no constipation, no loose stools      Amount of exercise or physical activity: 3 times weekly    Problems taking medications regularly: No    Medication side effects: none    Diet: regular (no restrictions)      Concerns re: history of chronic hep b vs c. Patient gives and unclear history. Denies symptoms       Problem list and histories reviewed & adjusted, as indicated.  Additional history: as documented    BP Readings from Last 3 Encounters:   08/23/18 125/84   07/24/18 143/89   06/08/18 121/82    Wt Readings from Last 3 Encounters:   08/23/18 139 lb (63 kg)   07/24/18 139 lb 9.6 oz (63.3 kg)   06/08/18 140 lb 12.8 oz (63.9 kg)                    Reviewed and updated as needed this visit by clinical staff  Allergies       Reviewed and updated as needed this visit by Provider         All other systems negative except as outline above  OBJECTIVE:  Eye exam - right eye normal lid, conjunctiva, cornea, pupil and fundus, left eye normal lid, conjunctiva, cornea, pupil and fundus.  Thyroid not palpable, not enlarged, no nodules detected.  CHEST:chest clear to IPPA, no tachypnea, retractions or cyanosis and S1, S2 normal, no murmur, no gallop, rate regular.  The abdomen is soft without tenderness, guarding, mass, rebound or organomegaly. Bowel sounds are normal. No CVA tenderness or inguinal adenopathy noted.    Bridgett was seen today for thyroid problem.    Diagnoses and all orders for this visit:    Hypothyroidism, unspecified type  -     TSH with free T4 reflex  -     levothyroxine (SYNTHROID/LEVOTHROID) 50 MCG tablet; TAKE 1 TABLET(50 MCG) BY MOUTH DAILY    Immunization due  -     FLU VAC PRESRV FREE QUAD SPLIT VIR, IM (3+ YRS)  -     ADMIN 1st VACCINE    Hepatitis B,  chronic (H)  -     Hepatitis B surface antigen  -     Hepatitis B Surface Antibody  -     Hepatic panel    Chronic hepatitis C without hepatic coma (H)  -     **Hepatitis C Screen Reflex to RNA FUTURE anytime  -     Hepatic panel      work on lifestyle modification

## 2018-11-05 LAB
HBV SURFACE AB SERPL IA-ACNC: 0.43 M[IU]/ML
HBV SURFACE AG SERPL QL IA: REACTIVE
HCV AB SERPL QL IA: NONREACTIVE

## 2019-02-18 ENCOUNTER — OFFICE VISIT (OUTPATIENT)
Dept: FAMILY MEDICINE | Facility: CLINIC | Age: 44
End: 2019-02-18
Payer: COMMERCIAL

## 2019-02-18 VITALS
DIASTOLIC BLOOD PRESSURE: 86 MMHG | BODY MASS INDEX: 28.02 KG/M2 | HEART RATE: 70 BPM | OXYGEN SATURATION: 99 % | SYSTOLIC BLOOD PRESSURE: 122 MMHG | TEMPERATURE: 98.2 F | HEIGHT: 59 IN | WEIGHT: 139 LBS

## 2019-02-18 DIAGNOSIS — Z32.02 PREGNANCY TEST NEGATIVE: Primary | ICD-10-CM

## 2019-02-18 DIAGNOSIS — N89.8 VAGINAL DISCHARGE: ICD-10-CM

## 2019-02-18 LAB
B-HCG SERPL-ACNC: 7 IU/L (ref 0–5)
HCG UR QL: NEGATIVE
SPECIMEN SOURCE: NORMAL
WET PREP SPEC: NORMAL

## 2019-02-18 PROCEDURE — 87591 N.GONORRHOEAE DNA AMP PROB: CPT | Performed by: NURSE PRACTITIONER

## 2019-02-18 PROCEDURE — 87210 SMEAR WET MOUNT SALINE/INK: CPT | Performed by: NURSE PRACTITIONER

## 2019-02-18 PROCEDURE — 36415 COLL VENOUS BLD VENIPUNCTURE: CPT | Performed by: NURSE PRACTITIONER

## 2019-02-18 PROCEDURE — 87491 CHLMYD TRACH DNA AMP PROBE: CPT | Performed by: NURSE PRACTITIONER

## 2019-02-18 PROCEDURE — 84702 CHORIONIC GONADOTROPIN TEST: CPT | Performed by: NURSE PRACTITIONER

## 2019-02-18 PROCEDURE — 81025 URINE PREGNANCY TEST: CPT | Performed by: NURSE PRACTITIONER

## 2019-02-18 PROCEDURE — 99213 OFFICE O/P EST LOW 20 MIN: CPT | Performed by: NURSE PRACTITIONER

## 2019-02-18 ASSESSMENT — MIFFLIN-ST. JEOR: SCORE: 1182

## 2019-02-18 NOTE — PROGRESS NOTES
SUBJECTIVE:   Bridgett Hardin is a 43 year old female who presents to clinic today for the following health issues:      Patient is here to confirm pregnancy, she had positive pregnancy test at home. She has a slight pink discharge and has noticed pink when she wipes.  Yesterday morning slight positive and today a slight positive.  No cramping, +mild breast tenderness  Menses usually every 28 days.  Patient's last menstrual period was 01/16/2019.    Miscarriage May 2018.  Unsure if she would like to be pregnant but her partner would like to be.      Problem list and histories reviewed & adjusted, as indicated.  Additional history: as documented    Patient Active Problem List   Diagnosis     Hypothyroidism, unspecified type     Supervision of high-risk pregnancy of elderly multigravida     Cold sore     Hepatitis B, chronic (H)     Hepatitis C, chronic (H)     Past Surgical History:   Procedure Laterality Date     DILATION AND CURETTAGE SUCTION N/A 5/11/2018    Procedure: DILATION AND CURETTAGE SUCTION;  Suction D & C;  Surgeon: Edmond Garvin MD;  Location:  OR       Social History     Tobacco Use     Smoking status: Never Smoker     Smokeless tobacco: Never Used   Substance Use Topics     Alcohol use: No     History reviewed. No pertinent family history.      Current Outpatient Medications   Medication Sig Dispense Refill     levothyroxine (SYNTHROID/LEVOTHROID) 50 MCG tablet TAKE 1 TABLET(50 MCG) BY MOUTH DAILY 90 tablet 3     No Known Allergies  BP Readings from Last 3 Encounters:   02/18/19 122/86   11/02/18 124/86   08/23/18 125/84    Wt Readings from Last 3 Encounters:   02/18/19 63 kg (139 lb)   11/02/18 61.7 kg (136 lb)   08/23/18 63 kg (139 lb)                    Reviewed and updated as needed this visit by clinical staff  Tobacco  Allergies  Meds  Med Hx  Surg Hx  Fam Hx  Soc Hx      Reviewed and updated as needed this visit by Provider  Tobacco  Allergies  Meds  Med Hx  Surg Hx  Fam Hx  Soc  "Hx        ROS:  Constitutional, HEENT, cardiovascular, pulmonary, gi and gu systems are negative, except as otherwise noted.    OBJECTIVE:     /86   Pulse 70   Temp 98.2  F (36.8  C) (Oral)   Ht 1.5 m (4' 11.06\")   Wt 63 kg (139 lb)   LMP 01/16/2019   SpO2 99%   Breastfeeding? No   BMI 28.02 kg/m    Body mass index is 28.02 kg/m .  GENERAL: healthy, alert and no distress  NECK: no adenopathy, no asymmetry, masses, or scars and thyroid normal to palpation  RESP: lungs clear to auscultation - no rales, rhonchi or wheezes  CV: regular rate and rhythm, normal S1 S2, no S3 or S4, no murmur, click or rub, no peripheral edema and peripheral pulses strong  ABDOMEN: soft, nontender, no hepatosplenomegaly, no masses and bowel sounds normal   (female): normal female external genitalia, normal urethral meatus , vaginal mucosa pink, moist, well rugated, normal cervix, adnexae, and uterus without masses. and dark red blood seen in canal.  Os closed- small amount of bloodoozing from opening.   MS: no gross musculoskeletal defects noted, no edema    Diagnostic Test Results:  Results for orders placed or performed in visit on 02/18/19 (from the past 24 hour(s))   HCG Qual, Urine - CSC,  Range, Nathalie  ([id number])   Result Value Ref Range    HCG Qual Urine Negative NEG^Negative   Wet prep   Result Value Ref Range    Specimen Description Vagina     Wet Prep No Trichomonas seen     Wet Prep No clue cells seen     Wet Prep No yeast seen        ASSESSMENT/PLAN:     1. Pregnancy test negative  Discussed home test may have been false positive given vaginal exam consistent with starting menses and negative UPT here.  Will confirm with quant.    - HCG Qual, Urine - CSC,  Range, Nathalie  ([id number])  - HCG Quantitative Pregnancy, Blood ([id number])    2. Vaginal discharge  - NEISSERIA GONORRHOEA PCR  - CHLAMYDIA TRACHOMATIS PCR  - Wet prep    Patient Instructions   We will call you tomorrow with the results of your pregnancy " blood test.            CORINNE Guerra [facility]

## 2019-02-19 DIAGNOSIS — Z32.02 URINE PREGNANCY TEST NEGATIVE: Primary | ICD-10-CM

## 2019-05-31 ENCOUNTER — OFFICE VISIT (OUTPATIENT)
Dept: OBGYN | Facility: CLINIC | Age: 44
End: 2019-05-31
Payer: COMMERCIAL

## 2019-05-31 VITALS
SYSTOLIC BLOOD PRESSURE: 124 MMHG | HEART RATE: 64 BPM | WEIGHT: 141 LBS | DIASTOLIC BLOOD PRESSURE: 85 MMHG | BODY MASS INDEX: 28.43 KG/M2 | OXYGEN SATURATION: 97 %

## 2019-05-31 DIAGNOSIS — O03.9 MISCARRIAGE: ICD-10-CM

## 2019-05-31 DIAGNOSIS — Z32.00 PREGNANCY EXAMINATION OR TEST, PREGNANCY UNCONFIRMED: Primary | ICD-10-CM

## 2019-05-31 PROBLEM — O20.0 THREATENED ABORTION: Status: ACTIVE | Noted: 2019-05-31

## 2019-05-31 LAB — HCG UR QL: NEGATIVE

## 2019-05-31 PROCEDURE — 99214 OFFICE O/P EST MOD 30 MIN: CPT | Performed by: OBSTETRICS & GYNECOLOGY

## 2019-05-31 PROCEDURE — 81025 URINE PREGNANCY TEST: CPT | Performed by: OBSTETRICS & GYNECOLOGY

## 2019-05-31 NOTE — PATIENT INSTRUCTIONS
If you have any questions regarding your visit, Please contact your care team.  [facility]: [phone]  [facility] CLINIC HOURS TELEPHONE NUMBER   Cephas Agbeh, M.D. Lisa -       Semaj Ramirez         Monday-[place]    8:00a.m-4:45 p.m    Tuesday--[place]     8:00a.m-4:45 p.m.    Thursday-[place]    8:00a.m-4:45 p.m.    Friday-[place]    8:00a.m-4:45 p.m    [facility]   [address] [place] [address]   [phone]-Ask for [facility]   Fax [phone]   Szbbhrf-[phone]     [facility]   [address] [place] [address] [place] [address]   [phone]    [facility]  [address] [facility] [address]  [phone]  Prbuoqx-[phone]   Urgent Care locations:    [facility] Monday-Friday  5 pm - 9 pm  Saturday and Sunday   9 am - 5 pm   Monday-Friday   5 pm - 9 pm  Saturday and Sunday  9 am - 5 pm    [phone]   If you need a medication refill, please contact your pharmacy. Please allow 3 business days for your refill to be completed.  As always, Thank you for trusting us with your healthcare needs!

## 2019-05-31 NOTE — PROGRESS NOTES
Bridgett is a 43 year old  referred here by self for consultation regarding bleeding with pregnancy. LMP in April. Has had 4 positive home pregancy tests 1 and 2 weeks ago.  She began bleeding 5 days ago with spotting and developed into heavy bleeding with passage of tissue..Bleeding has subsided today.    She had a missed AB a year ago and went on to have Suction D&C.    ROS: Ten point review of systems was reviewed and negative except the above.    Gyne: - abn pap (last pap ), - STD's    No past medical history on file.  Past Surgical History:   Procedure Laterality Date     DILATION AND CURETTAGE SUCTION N/A 2018    Procedure: DILATION AND CURETTAGE SUCTION;  Suction D & C;  Surgeon: Edmond Garvin MD;  Location:  OR     Patient Active Problem List   Diagnosis     Hypothyroidism, unspecified type     Supervision of high-risk pregnancy of elderly multigravida     Cold sore     Hepatitis B, chronic (H)     Hepatitis C, chronic (H)     Threatened        ALL/Meds: Her medication and allergy histories were reviewed and are documented in their appropriate chart areas.    SH: - tob, - EtOH,     FH: Her family history was reviewed and documented in its appropriate chart area.    PE: /85 (BP Location: Left arm, Patient Position: Chair, Cuff Size: Adult Regular)   Pulse 64   Wt 64 kg (141 lb)   LMP 2019 (Exact Date)   SpO2 97%   Breastfeeding? No   BMI 28.43 kg/m    Body mass index is 28.43 kg/m .    General Appearance:  healthy, alert, active, no distress  HEENT: NCAT  Abdomen: Soft, nontender.  Normal bowel sounds.  No masses  Pelvic:       - Ext: NEFG,        - Urethra: no lesions, no masses, no hypermobility       - Urethral Meatus: normal appearance,        - Bladder: no tenderness, no masses       - Vagina: pink, moist, normal rugae,  lesions, bloody discharge       - Cervix: no lesions, parous       - Uterus: normal sized, AV/RV/Midplane, mobile, normal contour       -  Adnexa: no masses, no tenderness       - Rectal: deferred, normal tone, negative guaic       - Pelvic support: no cystocele, no rectocele, no uterine prolapse  Results for orders placed or performed in visit on 05/31/19   HCG Qual, Urine ([id number])   Result Value Ref Range    HCG Qual Urine Negative NEG^Negative       A/P    ICD-10-CM    1. Pregnancy examination or test, pregnancy unconfirmed Z32.00 HCG Qual, Urine ([id number])     CANCELED: HCG quantitative pregnancy   2. Miscarriage O03.9      With a negative UPT today, this ia consistent with a miscarriage.  We discussed serial bhcg if she withes. She declines.  We discussed AMA as a possibility of her recent miscarriages.   She says her new spouse wants a child with her. Spouse.    ACOG pamphlet provided on pregnancy after age 35, [facility] health before pregnancy.    25 minutes was spent face to face with the patient today discussing her history, diagnosis, and follow-up plan as noted above.  Over 50% of the visit was spent in counseling and coordination of care.    Total Visit Time: 30 minutes.      CEPHAS AGBEH, MD.

## 2019-07-16 ENCOUNTER — NURSE TRIAGE (OUTPATIENT)
Dept: NURSING | Facility: CLINIC | Age: 44
End: 2019-07-16

## 2019-07-16 ENCOUNTER — TELEPHONE (OUTPATIENT)
Dept: FAMILY MEDICINE | Facility: CLINIC | Age: 44
End: 2019-07-16

## 2019-07-16 ENCOUNTER — OFFICE VISIT (OUTPATIENT)
Dept: URGENT CARE | Facility: URGENT CARE | Age: 44
End: 2019-07-16
Payer: COMMERCIAL

## 2019-07-16 VITALS
WEIGHT: 143 LBS | OXYGEN SATURATION: 99 % | HEART RATE: 68 BPM | TEMPERATURE: 98.3 F | SYSTOLIC BLOOD PRESSURE: 128 MMHG | DIASTOLIC BLOOD PRESSURE: 87 MMHG | RESPIRATION RATE: 18 BRPM | BODY MASS INDEX: 28.83 KG/M2

## 2019-07-16 DIAGNOSIS — R10.31 ABDOMINAL PAIN, RIGHT LOWER QUADRANT: Primary | ICD-10-CM

## 2019-07-16 DIAGNOSIS — N30.00 ACUTE CYSTITIS WITHOUT HEMATURIA: ICD-10-CM

## 2019-07-16 DIAGNOSIS — R82.90 NONSPECIFIC FINDING ON EXAMINATION OF URINE: ICD-10-CM

## 2019-07-16 LAB
ALBUMIN UR-MCNC: NEGATIVE MG/DL
AMORPH CRY #/AREA URNS HPF: ABNORMAL /HPF
APPEARANCE UR: ABNORMAL
BACTERIA #/AREA URNS HPF: ABNORMAL /HPF
BASOPHILS # BLD AUTO: 0 10E9/L (ref 0–0.2)
BASOPHILS NFR BLD AUTO: 0 %
BILIRUB UR QL STRIP: NEGATIVE
COLOR UR AUTO: YELLOW
DIFFERENTIAL METHOD BLD: NORMAL
EOSINOPHIL # BLD AUTO: 0.1 10E9/L (ref 0–0.7)
EOSINOPHIL NFR BLD AUTO: 1.6 %
ERYTHROCYTE [DISTWIDTH] IN BLOOD BY AUTOMATED COUNT: 12 % (ref 10–15)
GLUCOSE UR STRIP-MCNC: NEGATIVE MG/DL
HCG UR QL: NEGATIVE
HCT VFR BLD AUTO: 39.3 % (ref 35–47)
HGB BLD-MCNC: 13.5 G/DL (ref 11.7–15.7)
HGB UR QL STRIP: NEGATIVE
KETONES UR STRIP-MCNC: NEGATIVE MG/DL
LEUKOCYTE ESTERASE UR QL STRIP: ABNORMAL
LYMPHOCYTES # BLD AUTO: 2 10E9/L (ref 0.8–5.3)
LYMPHOCYTES NFR BLD AUTO: 23 %
MCH RBC QN AUTO: 31.1 PG (ref 26.5–33)
MCHC RBC AUTO-ENTMCNC: 34.4 G/DL (ref 31.5–36.5)
MCV RBC AUTO: 91 FL (ref 78–100)
MONOCYTES # BLD AUTO: 0.7 10E9/L (ref 0–1.3)
MONOCYTES NFR BLD AUTO: 8.3 %
NEUTROPHILS # BLD AUTO: 5.7 10E9/L (ref 1.6–8.3)
NEUTROPHILS NFR BLD AUTO: 67.1 %
NITRATE UR QL: POSITIVE
NON-SQ EPI CELLS #/AREA URNS LPF: ABNORMAL /LPF
PH UR STRIP: 7 PH (ref 5–7)
PLATELET # BLD AUTO: 218 10E9/L (ref 150–450)
RBC # BLD AUTO: 4.34 10E12/L (ref 3.8–5.2)
RBC #/AREA URNS AUTO: ABNORMAL /HPF
SOURCE: ABNORMAL
SP GR UR STRIP: 1.01 (ref 1–1.03)
UROBILINOGEN UR STRIP-ACNC: 1 EU/DL (ref 0.2–1)
WBC # BLD AUTO: 8.5 10E9/L (ref 4–11)
WBC #/AREA URNS AUTO: ABNORMAL /HPF

## 2019-07-16 PROCEDURE — 81025 URINE PREGNANCY TEST: CPT | Performed by: NURSE PRACTITIONER

## 2019-07-16 PROCEDURE — 81001 URINALYSIS AUTO W/SCOPE: CPT | Performed by: NURSE PRACTITIONER

## 2019-07-16 PROCEDURE — 99213 OFFICE O/P EST LOW 20 MIN: CPT | Performed by: NURSE PRACTITIONER

## 2019-07-16 PROCEDURE — 87186 SC STD MICRODIL/AGAR DIL: CPT | Performed by: NURSE PRACTITIONER

## 2019-07-16 PROCEDURE — 87088 URINE BACTERIA CULTURE: CPT | Performed by: NURSE PRACTITIONER

## 2019-07-16 PROCEDURE — 87086 URINE CULTURE/COLONY COUNT: CPT | Performed by: NURSE PRACTITIONER

## 2019-07-16 PROCEDURE — 36415 COLL VENOUS BLD VENIPUNCTURE: CPT | Performed by: NURSE PRACTITIONER

## 2019-07-16 PROCEDURE — 85025 COMPLETE CBC W/AUTO DIFF WBC: CPT | Performed by: NURSE PRACTITIONER

## 2019-07-16 RX ORDER — SULFAMETHOXAZOLE/TRIMETHOPRIM 800-160 MG
1 TABLET ORAL 2 TIMES DAILY
Qty: 6 TABLET | Refills: 0 | Status: SHIPPED | OUTPATIENT
Start: 2019-07-16 | End: 2019-07-26

## 2019-07-16 ASSESSMENT — ENCOUNTER SYMPTOMS
DIARRHEA: 0
CHILLS: 0
NAUSEA: 0
FEVER: 0
ABDOMINAL PAIN: 1
SORE THROAT: 0
SHORTNESS OF BREATH: 0
COUGH: 0
HEADACHES: 0
RHINORRHEA: 0
VOMITING: 0

## 2019-07-16 ASSESSMENT — PAIN SCALES - GENERAL: PAINLEVEL: WORST PAIN (10)

## 2019-07-16 NOTE — TELEPHONE ENCOUNTER
Clinic Action Needed: Please call back pt to advise,  ASAP about appt for abdominal pain today . .    Reason for Call: Pt wants appt with PCP America Andre at [facility] today , triaged and disposition recommended was ED but Pt prefers to see her PCP instead if PCP ok's.      [facility] triage call :   Presenting problem : Pt called.  Right lower  Abdominal pain started 7/15/19 , and walking  Slightly  bent over without a  known cause . Currently : no fever or injury , but 1&0 is ok , eating ok , RLQ abd pain is triggered by walking  And severe when walking.   Last period 6/25/19 .   Guideline used :  Abdominal pain - female - A [place] .   Disposition and recommendations : have someone drive you to ED but Pt prefers appt if PCP ok's this .   Caller verbalizes understanding and denies further questions and will call back if further symptoms to triage or questions or not re  . Narcisa Holloway RN  - [place] Nurse Advisor \    Reason for Disposition    SEVERE abdominal pain (e.g., excruciating)

## 2019-07-16 NOTE — TELEPHONE ENCOUNTER
Spoke with patient.   Providers message read as written.   Patient will go to  urgent care now.   No further questions at this time.     [place] Garcia RN, BSN, PHN

## 2019-07-16 NOTE — PATIENT INSTRUCTIONS
"  Patient Education     Bladder Infection, Female (Adult)    Urine is normally doesn't have any bacteria in it. But bacteria can get into the urinary tract from the skin around the rectum. Or they can travel in the blood from elsewhere in the body. Once they are in your urinary tract, they can cause infection in the urethra (urethritis), the bladder (cystitis), or the kidneys (pyelonephritis).  The most common place for an infection is in the bladder. This is called a bladder infection. This is one of the most common infections in women. Most bladder infections are easily treated. They are not serious unless the infection spreads to the kidney.  The phrases \"bladder infection,\" \"UTI,\" and \"cystitis\" are often used to describe the same thing. But they are not always the same. Cystitis is an inflammation of the bladder. The most common cause of cystitis is an infection.  Symptoms  The infection causes inflammation in the urethra and bladder. This causes many of the symptoms. The most common symptoms of a bladder infection are:    Pain or burning when urinating    Having to urinate more often than usual    Urgent need to urinate    Only a small amount of urine comes out    Blood in urine    Abdominal discomfort. This is usually in the lower abdomen above the pubic bone.    Cloudy urine    Strong- or bad-smelling urine    Unable to urinate (urinary retention)    Unable to hold urine in (urinary incontinence)    Fever    Loss of appetite    Confusion (in older adults)  Causes  Bladder infections are not contagious. You can't get one from someone else, from a toilet seat, or from sharing a bath.  The most common cause of bladder infections is bacteria from the bowels. The bacteria get onto the skin around the opening of the urethra. From there, they can get into the urine and travel up to the bladder, causing inflammation and infection. This usually happens because of:    Wiping improperly after urinating. Always wipe " from front to back.    Bowel incontinence    Pregnancy    Procedures such as having a catheter inserted    Older age    Not emptying your bladder. This can allow bacteria a chance to grow in your urine.    Dehydration    Constipation    Sex    Use of a diaphragm for birth control   Treatment  Bladder infections are diagnosed by a urine test. They are treated with antibiotics and usually clear up quickly without complications. Treatment helps prevent a more serious kidney infection.  Medicines  Medicines can help in the treatment of a bladder infection:    Take antibiotics until they are used up, even if you feel better. It is important to finish them to make sure the infection has cleared.    You can use acetaminophen or ibuprofen for pain, fever, or discomfort, unless another medicine was prescribed. If you have chronic liver or kidney disease, talk with your healthcare provider before using these medicines. Also talk with your provider if you've ever had a stomach ulcer or gastrointestinal bleeding, or are taking blood-thinner medicines.    If you are given phenazopydridine to reduce burning with urination, it will cause your urine to become a bright orange color. This can stain clothing.  Care and prevention  These self-care steps can help prevent future infections:    Drink plenty of fluids to prevent dehydration and flush out your bladder. Do this unless you must restrict fluids for other health reasons, or your doctor told you not to.    Proper cleaning after going to the bathroom is important. Wipe from front to back after using the toilet to prevent the spread of bacteria.    Urinate more often. Don't try to hold urine in for a long time.    Wear loose-fitting clothes and cotton underwear. Avoid tight-fitting pants.    Improve your diet and prevent constipation. Eat more fresh fruit and vegetables, and fiber, and less junk and fatty foods.    Avoid sex until your symptoms are gone.    Avoid caffeine,  alcohol, and spicy foods. These can irritate your bladder.    Urinate right after intercourse to flush out your bladder.    If you use birth control pills and have frequent bladder infections, discuss it with your doctor.  Follow-up care  Call your healthcare provider if all symptoms are not gone after 3 days of treatment. This is especially important if you have repeat infections.  If a culture was done, you will be told if your treatment needs to be changed. If directed, you can call to find out the results.  If X-rays were done, you will be told if the results will affect your treatment.  Call 911  Call 911 if any of the following occur:    Trouble breathing    Hard to wake up or confusion    Fainting or loss of consciousness    Rapid heart rate  When to seek medical advice  Call your healthcare provider right away if any of these occur:    Fever of 100.4 F (38.0 C) or higher, or as directed by your healthcare provider    Symptoms are not better by the third day of treatment    Back or belly (abdominal) pain that gets worse    Repeated vomiting, or unable to keep medicine down    Weakness or dizziness    Vaginal discharge    Pain, redness, or swelling in the outer vaginal area (labia)  Date Last Reviewed: 10/1/2016    8342-0526 [employer]. [address] [place] [address], [place] [address]. All rights reserved. This information is not intended as a substitute for professional medical care. Always follow your healthcare professional's instructions.

## 2019-07-16 NOTE — PROGRESS NOTES
SUBJECTIVE:   Bridgett Hardin is a 44 year old female presenting with a chief complaint of   Chief Complaint   Patient presents with     Abdominal Pain     RIGHT SIDED       She is an established patient of [facility].    Abdominal Pain    Location: RLQ   Radiation: None.    Pain character: sharp,   Severity: 7 on a scale of 1-10.    Duration: 2 day(s)   Course of Illness: slowly progressive.  Exacerbated by: bowel movement  Relieved by: nothing.  Associated Symptoms: none.  Female : Not applicable  Surgical History: none      Review of Systems   Constitutional: Negative for chills and fever.   HENT: Negative for congestion, ear pain, rhinorrhea and sore throat.    Respiratory: Negative for cough and shortness of breath.    Gastrointestinal: Positive for abdominal pain. Negative for diarrhea, nausea and vomiting.   Neurological: Negative for headaches.   All other systems reviewed and are negative.      No past medical history on file.  History reviewed. No pertinent family history.  Current Outpatient Medications   Medication Sig Dispense Refill     levothyroxine (SYNTHROID/LEVOTHROID) 50 MCG tablet TAKE 1 TABLET(50 MCG) BY MOUTH DAILY 90 tablet 3     sulfamethoxazole-trimethoprim (BACTRIM DS/SEPTRA DS) 800-160 MG tablet Take 1 tablet by mouth 2 times daily for 3 days 6 tablet 0     Social History     Tobacco Use     Smoking status: Never Smoker     Smokeless tobacco: Never Used   Substance Use Topics     Alcohol use: No       OBJECTIVE  /87 (BP Location: Left arm, Patient Position: Chair, Cuff Size: Adult Regular)   Pulse 68   Temp 98.3  F (36.8  C) (Oral)   Resp 18   Wt 64.9 kg (143 lb)   SpO2 99%   BMI 28.83 kg/m      Physical Exam   Constitutional: No distress.   HENT:   Head: Normocephalic and atraumatic.   Right Ear: Tympanic membrane and external ear normal.   Left Ear: Tympanic membrane and external ear normal.   Mouth/Throat: Oropharynx is clear and moist.   Eyes: Pupils are equal, round, and  reactive to light. EOM are normal.   Neck: Normal range of motion. Neck supple.   Cardiovascular: Normal rate and normal heart sounds.   Pulmonary/Chest: Effort normal and breath sounds normal. No respiratory distress.   Abdominal: Soft. Bowel sounds are normal. She exhibits no distension. There is no tenderness. There is no guarding.   Lymphadenopathy:     She has no cervical adenopathy.   Neurological: She is alert. No cranial nerve deficit.   Skin: Skin is warm and dry. She is not diaphoretic.   Psychiatric: She has a normal mood and affect.   Nursing note and vitals reviewed.    Results for orders placed or performed in visit on 07/16/19   *UA reflex to Microscopic and Culture ([facility] (except [place] and [facility])   Result Value Ref Range    Color Urine Yellow     Appearance Urine Slightly Cloudy     Glucose Urine Negative NEG^Negative mg/dL    Bilirubin Urine Negative NEG^Negative    Ketones Urine Negative NEG^Negative mg/dL    Specific Gravity Urine 1.015 1.003 - 1.035    Blood Urine Negative NEG^Negative    pH Urine 7.0 5.0 - 7.0 pH    Protein Albumin Urine Negative NEG^Negative mg/dL    Urobilinogen Urine 1.0 0.2 - 1.0 EU/dL    Nitrite Urine Positive (A) NEG^Negative    Leukocyte Esterase Urine Small (A) NEG^Negative    Source Midstream Urine    HCG Qual, Urine ([id number])   Result Value Ref Range    HCG Qual Urine Negative NEG^Negative   CBC with platelets differential   Result Value Ref Range    WBC 8.5 4.0 - 11.0 10e9/L    RBC Count 4.34 3.8 - 5.2 10e12/L    Hemoglobin 13.5 11.7 - 15.7 g/dL    Hematocrit 39.3 35.0 - 47.0 %    MCV 91 78 - 100 fl    MCH 31.1 26.5 - 33.0 pg    MCHC 34.4 31.5 - 36.5 g/dL    RDW 12.0 10.0 - 15.0 %    Platelet Count 218 150 - 450 10e9/L    % Neutrophils 67.1 %    % Lymphocytes 23.0 %    % Monocytes 8.3 %    % Eosinophils 1.6 %    % Basophils 0.0 %    Absolute Neutrophil 5.7 1.6 - 8.3 10e9/L    Absolute Lymphocytes 2.0 0.8 - 5.3 10e9/L    Absolute Monocytes  0.7 0.0 - 1.3 10e9/L    Absolute Eosinophils 0.1 0.0 - 0.7 10e9/L    Absolute Basophils 0.0 0.0 - 0.2 10e9/L    Diff Method Automated Method    Urine Microscopic   Result Value Ref Range    WBC Urine 5-10 (A) OTO5^0 - 5 /HPF    RBC Urine O - 2 OTO2^O - 2 /HPF    Squamous Epithelial /LPF Urine Few FEW^Few /LPF    Bacteria Urine Many (A) NEG^Negative /HPF    Amorphous Crystals Few (A) NEG^Negative /HPF       ASSESSMENT:      ICD-10-CM    1. Abdominal pain, right lower quadrant R10.31 *UA reflex to Microscopic and Culture ([facility] (except [place] and [place])     HCG Qual, Urine ([id number])     CBC with platelets differential     Urine Microscopic     Urine Culture Aerobic Bacterial   2. Nonspecific finding on examination of urine R82.90 Urine Culture Aerobic Bacterial   3. Acute cystitis without hematuria N30.00 sulfamethoxazole-trimethoprim (BACTRIM DS/SEPTRA DS) 800-160 MG tablet          PLAN:  I have discussed clinical findings with patient.  Side effects of medications discussed.  Symptomatic care is discussed.  I have discussed the possibility of  worsening symptoms and to RTC or ER if they occur.  All questions are answered and patient is in agreement with plan.   Patient care instructions are given to at the end of visit.      Patient Instructions     Patient Education     Bladder Infection, Female (Adult)    Urine is normally doesn't have any bacteria in it. But bacteria can get into the urinary tract from the skin around the rectum. Or they can travel in the blood from elsewhere in the body. Once they are in your urinary tract, they can cause infection in the urethra (urethritis), the bladder (cystitis), or the kidneys (pyelonephritis).  The most common place for an infection is in the bladder. This is called a bladder infection. This is one of the most common infections in women. Most bladder infections are easily treated. They are not serious unless the infection spreads to the  "kidney.  The phrases \"bladder infection,\" \"UTI,\" and \"cystitis\" are often used to describe the same thing. But they are not always the same. Cystitis is an inflammation of the bladder. The most common cause of cystitis is an infection.  Symptoms  The infection causes inflammation in the urethra and bladder. This causes many of the symptoms. The most common symptoms of a bladder infection are:    Pain or burning when urinating    Having to urinate more often than usual    Urgent need to urinate    Only a small amount of urine comes out    Blood in urine    Abdominal discomfort. This is usually in the lower abdomen above the pubic bone.    Cloudy urine    Strong- or bad-smelling urine    Unable to urinate (urinary retention)    Unable to hold urine in (urinary incontinence)    Fever    Loss of appetite    Confusion (in older adults)  Causes  Bladder infections are not contagious. You can't get one from someone else, from a toilet seat, or from sharing a bath.  The most common cause of bladder infections is bacteria from the bowels. The bacteria get onto the skin around the opening of the urethra. From there, they can get into the urine and travel up to the bladder, causing inflammation and infection. This usually happens because of:    Wiping improperly after urinating. Always wipe from front to back.    Bowel incontinence    Pregnancy    Procedures such as having a catheter inserted    Older age    Not emptying your bladder. This can allow bacteria a chance to grow in your urine.    Dehydration    Constipation    Sex    Use of a diaphragm for birth control   Treatment  Bladder infections are diagnosed by a urine test. They are treated with antibiotics and usually clear up quickly without complications. Treatment helps prevent a more serious kidney infection.  Medicines  Medicines can help in the treatment of a bladder infection:    Take antibiotics until they are used up, even if you feel better. It is important to " finish them to make sure the infection has cleared.    You can use acetaminophen or ibuprofen for pain, fever, or discomfort, unless another medicine was prescribed. If you have chronic liver or kidney disease, talk with your healthcare provider before using these medicines. Also talk with your provider if you've ever had a stomach ulcer or gastrointestinal bleeding, or are taking blood-thinner medicines.    If you are given phenazopydridine to reduce burning with urination, it will cause your urine to become a bright orange color. This can stain clothing.  Care and prevention  These self-care steps can help prevent future infections:    Drink plenty of fluids to prevent dehydration and flush out your bladder. Do this unless you must restrict fluids for other health reasons, or your doctor told you not to.    Proper cleaning after going to the bathroom is important. Wipe from front to back after using the toilet to prevent the spread of bacteria.    Urinate more often. Don't try to hold urine in for a long time.    Wear loose-fitting clothes and cotton underwear. Avoid tight-fitting pants.    Improve your diet and prevent constipation. Eat more fresh fruit and vegetables, and fiber, and less junk and fatty foods.    Avoid sex until your symptoms are gone.    Avoid caffeine, alcohol, and spicy foods. These can irritate your bladder.    Urinate right after intercourse to flush out your bladder.    If you use birth control pills and have frequent bladder infections, discuss it with your doctor.  Follow-up care  Call your healthcare provider if all symptoms are not gone after 3 days of treatment. This is especially important if you have repeat infections.  If a culture was done, you will be told if your treatment needs to be changed. If directed, you can call to find out the results.  If X-rays were done, you will be told if the results will affect your treatment.  Call 911  Call 911 if any of the following  occur:    Trouble breathing    Hard to wake up or confusion    Fainting or loss of consciousness    Rapid heart rate  When to seek medical advice  Call your healthcare provider right away if any of these occur:    Fever of 100.4 F (38.0 C) or higher, or as directed by your healthcare provider    Symptoms are not better by the third day of treatment    Back or belly (abdominal) pain that gets worse    Repeated vomiting, or unable to keep medicine down    Weakness or dizziness    Vaginal discharge    Pain, redness, or swelling in the outer vaginal area (labia)  Date Last Reviewed: 10/1/2016    4097-5242 [employer]. [address] [place] [address]. All rights reserved. This information is not intended as a substitute for professional medical care. Always follow your healthcare professional's instructions.

## 2019-07-16 NOTE — TELEPHONE ENCOUNTER
No appointments open, she should be seen by another provider, or in urgent care/ ER. CORINNE Anand, FNP-BC

## 2019-07-16 NOTE — TELEPHONE ENCOUNTER
Clinic Action Needed: Please call back pt to advise ASAP.    Reason for Call: Pt wants appt with PCP America Andre at [facility] today , triaged and disposition recommended was ED but Pt prefers to see her PCP instead if PCP ok's.      [facility] triage call :   Presenting problem : Pt called.  Right lower  Abdominal pain started 7/15/19 , and walking  Slightly  bent over without a  known cause . Currently : no fever or injury , but 1&0 is ok , eating ok , RLQ abd pain is triggered by walking  And severe when walking.   Last period 6/25/19 .   Guideline used :  Abdominal pain - female - A [place] .   Disposition and recommendations : have someone drive you to ED but Pt prefers appt if PCP ok's this .   Caller verbalizes understanding and denies further questions and will call back if further symptoms to triage or questions  . Narcisa Holloway RN  - [facility] Nurse Advisor \    Reason for Disposition    SEVERE abdominal pain (e.g., excruciating)    Additional Information    Negative: Passed out (i.e., fainted, collapsed and was not responding)    Negative: Shock suspected (e.g., cold/pale/clammy skin, too weak to stand, low BP, rapid pulse)    Negative: Sounds like a life-threatening emergency to the triager    Negative: Chest pain    Negative: Pain is mainly in upper abdomen (if needed ask: 'is it mainly above the belly button?')    Negative: Abdominal pain and pregnant > 20 weeks    Negative: Abdominal pain and pregnant < 20 weeks    Protocols used: ABDOMINAL PAIN - FEMALE-A-OH

## 2019-07-18 DIAGNOSIS — N30.00 ACUTE CYSTITIS WITHOUT HEMATURIA: Primary | ICD-10-CM

## 2019-07-18 LAB
BACTERIA SPEC CULT: ABNORMAL
SPECIMEN SOURCE: ABNORMAL

## 2019-07-18 RX ORDER — NITROFURANTOIN 25; 75 MG/1; MG/1
100 CAPSULE ORAL 2 TIMES DAILY
Qty: 14 CAPSULE | Refills: 0 | Status: SHIPPED | OUTPATIENT
Start: 2019-07-18 | End: 2019-07-26

## 2019-07-25 ENCOUNTER — TELEPHONE (OUTPATIENT)
Dept: FAMILY MEDICINE | Facility: CLINIC | Age: 44
End: 2019-07-25

## 2019-07-25 NOTE — TELEPHONE ENCOUNTER
Looks like patient was seen in UC on 7-16-19 for abdominal pain.  Started on antibiotic for acute cystitis without hematuria.  Was advised to follow up with PCP.  Done with antibiotic and still having pain.   Will send to provider to see if okay to use SD slot for tomorrow.

## 2019-07-25 NOTE — TELEPHONE ENCOUNTER
Patient states she still is having R side abdominal pain.  traige is declined.  She just wants to know what she should do now. Please call.  Ok to leave a detailed message.  Thank You.

## 2019-07-26 ENCOUNTER — OFFICE VISIT (OUTPATIENT)
Dept: FAMILY MEDICINE | Facility: CLINIC | Age: 44
End: 2019-07-26
Payer: COMMERCIAL

## 2019-07-26 VITALS
HEART RATE: 68 BPM | OXYGEN SATURATION: 99 % | TEMPERATURE: 98.6 F | SYSTOLIC BLOOD PRESSURE: 111 MMHG | RESPIRATION RATE: 16 BRPM | DIASTOLIC BLOOD PRESSURE: 76 MMHG | WEIGHT: 139.4 LBS | BODY MASS INDEX: 28.1 KG/M2

## 2019-07-26 DIAGNOSIS — R39.9 URINARY SYMPTOM OR SIGN: ICD-10-CM

## 2019-07-26 DIAGNOSIS — N92.6 MISSED PERIOD: ICD-10-CM

## 2019-07-26 DIAGNOSIS — Z87.440 RECENT URINARY TRACT INFECTION: ICD-10-CM

## 2019-07-26 DIAGNOSIS — R10.31 RLQ ABDOMINAL PAIN: ICD-10-CM

## 2019-07-26 DIAGNOSIS — Z09 FOLLOW-UP EXAM: Primary | ICD-10-CM

## 2019-07-26 DIAGNOSIS — Z87.59 HISTORY OF MISCARRIAGE: ICD-10-CM

## 2019-07-26 DIAGNOSIS — E03.9 HYPOTHYROIDISM, UNSPECIFIED TYPE: ICD-10-CM

## 2019-07-26 DIAGNOSIS — N39.0 RECURRENT UTI: ICD-10-CM

## 2019-07-26 LAB
ALBUMIN UR-MCNC: NEGATIVE MG/DL
APPEARANCE UR: CLEAR
BACTERIA #/AREA URNS HPF: ABNORMAL /HPF
BILIRUB UR QL STRIP: NEGATIVE
COLOR UR AUTO: YELLOW
GLUCOSE UR STRIP-MCNC: NEGATIVE MG/DL
HCG UR QL: NEGATIVE
HGB UR QL STRIP: NEGATIVE
KETONES UR STRIP-MCNC: NEGATIVE MG/DL
LEUKOCYTE ESTERASE UR QL STRIP: ABNORMAL
NITRATE UR QL: NEGATIVE
NON-SQ EPI CELLS #/AREA URNS LPF: ABNORMAL /LPF
PH UR STRIP: 6.5 PH (ref 5–7)
RBC #/AREA URNS AUTO: ABNORMAL /HPF
SOURCE: ABNORMAL
SP GR UR STRIP: <=1.005 (ref 1–1.03)
TSH SERPL DL<=0.005 MIU/L-ACNC: 1.95 MU/L (ref 0.4–4)
UROBILINOGEN UR STRIP-ACNC: 0.2 EU/DL (ref 0.2–1)
WBC #/AREA URNS AUTO: ABNORMAL /HPF

## 2019-07-26 PROCEDURE — 99214 OFFICE O/P EST MOD 30 MIN: CPT | Performed by: NURSE PRACTITIONER

## 2019-07-26 PROCEDURE — 81025 URINE PREGNANCY TEST: CPT | Performed by: NURSE PRACTITIONER

## 2019-07-26 PROCEDURE — 81001 URINALYSIS AUTO W/SCOPE: CPT | Performed by: NURSE PRACTITIONER

## 2019-07-26 PROCEDURE — 36415 COLL VENOUS BLD VENIPUNCTURE: CPT | Performed by: NURSE PRACTITIONER

## 2019-07-26 PROCEDURE — 84443 ASSAY THYROID STIM HORMONE: CPT | Performed by: NURSE PRACTITIONER

## 2019-07-26 RX ORDER — LEVOTHYROXINE SODIUM 50 UG/1
TABLET ORAL
Qty: 90 TABLET | Refills: 3 | Status: SHIPPED | OUTPATIENT
Start: 2019-07-26 | End: 2019-07-26

## 2019-07-26 RX ORDER — LEVOTHYROXINE SODIUM 50 UG/1
TABLET ORAL
Qty: 90 TABLET | Refills: 3 | Status: SHIPPED | OUTPATIENT
Start: 2019-07-26 | End: 2020-02-17

## 2019-07-26 RX ORDER — CIPROFLOXACIN 500 MG/1
500 TABLET, FILM COATED ORAL 2 TIMES DAILY
Qty: 6 TABLET | Refills: 0 | Status: SHIPPED | OUTPATIENT
Start: 2019-07-26 | End: 2020-02-17

## 2019-07-26 RX ORDER — SULFAMETHOXAZOLE/TRIMETHOPRIM 800-160 MG
1 TABLET ORAL 2 TIMES DAILY
Qty: 10 TABLET | Refills: 0 | Status: CANCELLED | OUTPATIENT
Start: 2019-07-26 | End: 2019-07-31

## 2019-07-26 NOTE — PROGRESS NOTES
Subjective     Bridgett Hardin is a 44 year old female who presents to clinic today for the following health issues:    HPI   ED/UC Followup:    Facility:  [facility]  Date of visit: 19  Reason for visit: abdominal pain, acute cyctitis without heraturia  Current Status: better but still having pain. Finished both antibiotics         Patient Active Problem List   Diagnosis     Hypothyroidism, unspecified type     Supervision of high-risk pregnancy of elderly multigravida     Cold sore     Hepatitis B, chronic (H)     Hepatitis C, chronic (H)     Threatened      History of miscarriage     Recurrent UTI     Missed period     Past Surgical History:   Procedure Laterality Date     DILATION AND CURETTAGE SUCTION N/A 2018    Procedure: DILATION AND CURETTAGE SUCTION;  Suction D & C;  Surgeon: Edmond Garvin MD;  Location:  OR       Social History     Tobacco Use     Smoking status: Never Smoker     Smokeless tobacco: Never Used   Substance Use Topics     Alcohol use: No     History reviewed. No pertinent family history.      Current Outpatient Medications   Medication Sig Dispense Refill     ciprofloxacin (CIPRO) 500 MG tablet Take 1 tablet (500 mg) by mouth 2 times daily 6 tablet 0     levothyroxine (SYNTHROID/LEVOTHROID) 50 MCG tablet TAKE 1 TABLET(50 MCG) BY MOUTH DAILY 90 tablet 3     No Known Allergies  Recent Labs   Lab Test 19  1635 18  1412  18  1650   A1C  --   --   --  5.8   LDL  --   --   --  88   HDL  --   --   --  56   TRIG  --   --   --  94   ALT  --  26  --   --    TSH 1.95 3.71   < > 5.85*    < > = values in this interval not displayed.      BP Readings from Last 3 Encounters:   19 111/76   19 128/87   19 124/85    Wt Readings from Last 3 Encounters:   19 63.2 kg (139 lb 6.4 oz)   19 64.9 kg (143 lb)   19 64 kg (141 lb)              Reviewed and updated as needed this visit by Provider  Tobacco  Allergies  Meds   "Problems  Med Hx  Surg Hx  Fam Hx         Review of Systems   ROS COMP: Constitutional, HEENT, cardiovascular, pulmonary, GI, , musculoskeletal, neuro, skin, endocrine and psych systems are negative, except as otherwise noted.      Objective    /76   Pulse 68   Temp 98.6  F (37  C) (Oral)   Resp 16   Wt 63.2 kg (139 lb 6.4 oz)   SpO2 99%   BMI 28.10 kg/m    Body mass index is 28.1 kg/m .     Physical Exam   GENERAL: healthy, alert and no distress  RESP: lungs clear to auscultation - no rales, rhonchi or wheezes  CV: regular rate and rhythm, normal S1 S2, no S3 or S4, no murmur, click or rub, no peripheral edema and peripheral pulses strong  ABDOMEN: soft,  no hepatosplenomegaly, no masses and bowel sounds normal, no CVA tenderness POSITIVE for RLQ tenderness with palpation, no rebound skin tenderness  SKIN: no suspicious rashes  PSYCH: mentation appears normal, affect normal/bright    Diagnostic Test Results:  Labs reviewed in Epic  See orders        Assessment & Plan       ICD-10-CM    1. Follow-up exam Z09    2. Urinary symptom or sign R39.9 UA reflex to Microscopic and Culture     Urine Microscopic   3. Hypothyroidism, unspecified type E03.9 TSH with free T4 reflex     levothyroxine (SYNTHROID/LEVOTHROID) 50 MCG tablet     TSH with free T4 reflex     DISCONTINUED: levothyroxine (SYNTHROID/LEVOTHROID) 50 MCG tablet   4. RLQ abdominal pain R10.31 US Pelvic Complete w Transvaginal   5. Recent urinary tract infection Z87.440    6. Missed period N92.6 HCG Qual, Urine ([id number])   7. Recurrent UTI N39.0 ciprofloxacin (CIPRO) 500 MG tablet   8. History of miscarriage Z87.59         BMI:   Estimated body mass index is 28.1 kg/m  as calculated from the following:    Height as of 2/18/19: 1.5 m (4' 11.06\").    Weight as of this encounter: 63.2 kg (139 lb 6.4 oz).   Weight management plan: Discussed healthy diet and exercise guidelines        See Patient Instructions:   Take full course of antibiotics. " Schedule pelvic ultrasound to make sure nothing is wrong with your ovary/ appendix- both are in the right lower quadrant. Follow up if symptoms persist after treatment or worsen.     Return in about 1 week (around 8/2/2019), or if symptoms worsen or fail to improve.    America Andre, CYNTHIA  [facility]

## 2019-07-26 NOTE — PATIENT INSTRUCTIONS
Reminders: If you are signed up for [employer] please be aware your results and communications will be sent within your mychart. Please remember to arrive 5-10 minutes early for your appointments. If you are late you may need to reschedule your appointment.      Take full course of antibiotics. Schedule pelvic ultrasound to make sure nothing is wrong with your ovary/ appendix- both are in the right lower quadrant. Follow up if symptoms persist after treatment or worsen.       Patient Education     Unknown Causes of Abdominal Pain (Female)    The exact cause of your belly (abdominal) pain is not clear. This does not mean that this is something to worry about. Everyone likes to know the exact cause of the problem. But sometimes with belly pain, there is no clear-cut cause, and this could be a good thing. The good news is that your symptoms can be treated, and you will feel better.   Your condition does not seem serious now. But sometimes the signs of a serious problem may take more time to appear. For this reason, it is important for you to watch for any new symptoms, problems, or worsening of your condition.  Over the next few days, the abdominal pain may come and go. Or it may be constant. Other common symptoms can include nausea and vomiting. Sometimes it can be difficult to tell if you feel nauseous. You may just feel bad and not connect that feeling to nausea. Constipation, diarrhea, and a fever may go along with the pain.  The pain may continue even if treated correctly over the following days. Depending on how things go, sometimes the cause can become clear and may need more or different treatment. Additional evaluations, medicines, or tests may also be needed.  Home care  Your healthcare provider may prescribe medicine for pain, symptoms, or an infection.  Follow the healthcare provider's instructions for taking these medicines.  General care    Rest as much as you can until your next exam. No strenuous  activities.    Try to find positions that ease discomfort. A small pillow placed on the abdomen may help relieve pain.    Something warm on your abdomen (such as a heating pad) may help, but be careful not to burn yourself.  Diet    Don t force yourself to eat, especially if having cramps, vomiting, or diarrhea.    Water is important so you don't get dehydrated. Soup may also be good. Sports drinks may also help, especially if they are not too acidic. Don't drink sugary drinks as this can make things worse. Take liquids in small amounts. Don t guzzle them.    Caffeine sometimes makes the pain and cramping worse.    Don t take dairy products if you have vomiting or diarrhea.    Don't eat large amounts at a time. Wait a few minutes between bites.    Eat a diet low in fiber (called a low-residue diet). Foods allowed include refined breads, white rice, fruit and vegetable juices without pulp, tender meats. These foods will pass more easily through the intestine.    Don t have whole-grain foods, whole fruits and vegetables, meats, seeds and nuts, fried or fatty foods, dairy, alcohol and spicy foods until your symptoms go away.  Follow-up care  Follow up with your healthcare provider, or as advised, if your pain does not begin to improve in the next 24 hours.  Call 911  Call 911 if any of these occur:    Trouble breathing    Confusion    Fainting or loss of consciousness    Rapid heart rate    Seizure  When to seek medical advice  Call your healthcare provider right away if any of these occur:    Pain gets worse or moves to the right lower abdomen    New or worsening vomiting or diarrhea    Swelling of the abdomen    Unable to pass stool for more than 3 days    Fever of 100.4 F (38 C) or higher, or as directed by your healthcare provider.    Blood in vomit or bowel movements (dark red or black color)    Yellow color of eyes and skin (jaundice)    Weakness, dizziness    Chest, arm, back, neck, or jaw pain    Unexpected  "vaginal bleeding or missed period    Can't keep down liquids or water and you are getting dehydrated  Date Last Reviewed: 6/1/2018 2000-2018 [employer]. [address] [place] [address], [place] [address]. All rights reserved. This information is not intended as a substitute for professional medical care. Always follow your healthcare professional's instructions.           Patient Education     Bladder Infection, Female (Adult)    Urine is normally doesn't have any bacteria in it. But bacteria can get into the urinary tract from the skin around the rectum. Or they can travel in the blood from elsewhere in the body. Once they are in your urinary tract, they can cause infection in the urethra (urethritis), the bladder (cystitis), or the kidneys (pyelonephritis).  The most common place for an infection is in the bladder. This is called a bladder infection. This is one of the most common infections in women. Most bladder infections are easily treated. They are not serious unless the infection spreads to the kidney.  The phrases \"bladder infection,\" \"UTI,\" and \"cystitis\" are often used to describe the same thing. But they are not always the same. Cystitis is an inflammation of the bladder. The most common cause of cystitis is an infection.  Symptoms  The infection causes inflammation in the urethra and bladder. This causes many of the symptoms. The most common symptoms of a bladder infection are:    Pain or burning when urinating    Having to urinate more often than usual    Urgent need to urinate    Only a small amount of urine comes out    Blood in urine    Abdominal discomfort. This is usually in the lower abdomen above the pubic bone.    Cloudy urine    Strong- or bad-smelling urine    Unable to urinate (urinary retention)    Unable to hold urine in (urinary incontinence)    Fever    Loss of appetite    Confusion (in older adults)  Causes  Bladder infections are not contagious. You can't get one from someone " else, from a toilet seat, or from sharing a bath.  The most common cause of bladder infections is bacteria from the bowels. The bacteria get onto the skin around the opening of the urethra. From there, they can get into the urine and travel up to the bladder, causing inflammation and infection. This usually happens because of:    Wiping improperly after urinating. Always wipe from front to back.    Bowel incontinence    Pregnancy    Procedures such as having a catheter inserted    Older age    Not emptying your bladder. This can allow bacteria a chance to grow in your urine.    Dehydration    Constipation    Sex    Use of a diaphragm for birth control   Treatment  Bladder infections are diagnosed by a urine test. They are treated with antibiotics and usually clear up quickly without complications. Treatment helps prevent a more serious kidney infection.  Medicines  Medicines can help in the treatment of a bladder infection:    Take antibiotics until they are used up, even if you feel better. It is important to finish them to make sure the infection has cleared.    You can use acetaminophen or ibuprofen for pain, fever, or discomfort, unless another medicine was prescribed. If you have chronic liver or kidney disease, talk with your healthcare provider before using these medicines. Also talk with your provider if you've ever had a stomach ulcer or gastrointestinal bleeding, or are taking blood-thinner medicines.    If you are given phenazopydridine to reduce burning with urination, it will cause your urine to become a bright orange color. This can stain clothing.  Care and prevention  These self-care steps can help prevent future infections:    Drink plenty of fluids to prevent dehydration and flush out your bladder. Do this unless you must restrict fluids for other health reasons, or your doctor told you not to.    Proper cleaning after going to the bathroom is important. Wipe from front to back after using the  toilet to prevent the spread of bacteria.    Urinate more often. Don't try to hold urine in for a long time.    Wear loose-fitting clothes and cotton underwear. Avoid tight-fitting pants.    Improve your diet and prevent constipation. Eat more fresh fruit and vegetables, and fiber, and less junk and fatty foods.    Avoid sex until your symptoms are gone.    Avoid caffeine, alcohol, and spicy foods. These can irritate your bladder.    Urinate right after intercourse to flush out your bladder.    If you use birth control pills and have frequent bladder infections, discuss it with your doctor.  Follow-up care  Call your healthcare provider if all symptoms are not gone after 3 days of treatment. This is especially important if you have repeat infections.  If a culture was done, you will be told if your treatment needs to be changed. If directed, you can call to find out the results.  If X-rays were done, you will be told if the results will affect your treatment.  Call 911  Call 911 if any of the following occur:    Trouble breathing    Hard to wake up or confusion    Fainting or loss of consciousness    Rapid heart rate  When to seek medical advice  Call your healthcare provider right away if any of these occur:    Fever of 100.4 F (38.0 C) or higher, or as directed by your healthcare provider    Symptoms are not better by the third day of treatment    Back or belly (abdominal) pain that gets worse    Repeated vomiting, or unable to keep medicine down    Weakness or dizziness    Vaginal discharge    Pain, redness, or swelling in the outer vaginal area (labia)  Date Last Reviewed: 10/1/2016    8712-2316 [employer]. [address] [place] [address], [place] [address]. All rights reserved. This information is not intended as a substitute for professional medical care. Always follow your healthcare professional's instructions.

## 2019-07-29 PROBLEM — N92.6 MISSED PERIOD: Status: ACTIVE | Noted: 2019-07-29

## 2019-07-30 ENCOUNTER — ANCILLARY PROCEDURE (OUTPATIENT)
Dept: ULTRASOUND IMAGING | Facility: CLINIC | Age: 44
End: 2019-07-30
Attending: NURSE PRACTITIONER
Payer: COMMERCIAL

## 2019-07-30 DIAGNOSIS — R10.31 RLQ ABDOMINAL PAIN: ICD-10-CM

## 2019-07-30 PROCEDURE — 76830 TRANSVAGINAL US NON-OB: CPT

## 2019-07-30 PROCEDURE — 76856 US EXAM PELVIC COMPLETE: CPT | Mod: 59

## 2019-08-01 DIAGNOSIS — N83.209 CYST OF OVARY, UNSPECIFIED LATERALITY: Primary | ICD-10-CM

## 2019-08-01 NOTE — RESULT ENCOUNTER NOTE
Pelvic ultrasound shows: IMPRESSION: Dominant follicle or small cyst right ovary measures 1.9  cm. Possible mild endometrial thickening. Follow-up ultrasound in 2 or 3 months could be performed to assess for resolution.  Order placed.   America Andre NP

## 2019-08-20 ENCOUNTER — ANCILLARY PROCEDURE (OUTPATIENT)
Dept: MAMMOGRAPHY | Facility: CLINIC | Age: 44
End: 2019-08-20
Payer: COMMERCIAL

## 2019-08-20 DIAGNOSIS — Z00.00 PREVENTATIVE HEALTH CARE: ICD-10-CM

## 2019-08-20 PROCEDURE — 77067 SCR MAMMO BI INCL CAD: CPT | Mod: TC

## 2019-10-04 ENCOUNTER — TELEPHONE (OUTPATIENT)
Dept: FAMILY MEDICINE | Facility: CLINIC | Age: 44
End: 2019-10-04

## 2019-10-04 ENCOUNTER — ANCILLARY PROCEDURE (OUTPATIENT)
Dept: ULTRASOUND IMAGING | Facility: CLINIC | Age: 44
End: 2019-10-04
Attending: NURSE PRACTITIONER
Payer: COMMERCIAL

## 2019-10-04 DIAGNOSIS — N83.209 CYST OF OVARY, UNSPECIFIED LATERALITY: ICD-10-CM

## 2019-10-04 PROCEDURE — 76830 TRANSVAGINAL US NON-OB: CPT | Performed by: RADIOLOGY

## 2019-10-04 PROCEDURE — 76856 US EXAM PELVIC COMPLETE: CPT | Mod: 59 | Performed by: RADIOLOGY

## 2019-10-04 NOTE — TELEPHONE ENCOUNTER
Spoke with pt, she said she started having pain again on her lower right side and it hurts to even walk. Her appt for ultrasound is not until 10/17/19 and wondering if she can get in sooner. I gave her the number for imaging but informed her that if she is in so much pain she should be evaluated in the ER. She verbalized understanding but wants to call imaging first to see if she can get in sooner and if not she will go to the ER.

## 2019-10-04 NOTE — LETTER
October 8, 2019      Bridgett Hardin  [id number] [address]        Dear ,    We are writing to inform you of your test results.    Thank you for your recent office visit.     Here are your recent results. Per radiology- Right ovarian cyst decreased from the prior study likely representing a normal evolving cyst. Otherwise, normal pelvic ultrasound.     Resulted Orders   US Pelvic Complete w Transvaginal    Narrative    EXAMINATION: US PELVIC COMPLETE WITH TRANSVAGINAL, 10/4/2019 4:11 PM     COMPARISON: 7/30/2019    HISTORY: Cyst of ovary, unspecified laterality    TECHNIQUE: The pelvis was scanned in standard fashion with  transabdominal and transvaginal transducer(s) using both grey scale  and color Doppler techniques.    FINDINGS:  The uterus measures 4.8 cm x 9.9 cm x 6.5 cm, and there is no evidence  of a focal fibroid.  The endometrium is within normal limits and  measures 9 mm. Tiny nabothian cysts. There is no free fluid in the  pelvis.    The right ovary measures 2.3 cm x 2.7 cm x 2.4 cm and the left ovary  measures 2.0 cm x 2.7 cm x 2.2 cm. Right ovarian cyst likely  representing an evolving hemorrhagic/corpus luteal cyst, decreased  from the prior study. There is no adnexal mass. There is normal blood  flow to the ovaries.      Impression    IMPRESSION: Right ovarian cyst decreased from the prior study likely  representing an evolving hemorrhagic/corpus luteal cyst. Otherwise,  normal pelvic ultrasound.    FERNANDO KENDALL MD       If you have any questions or concerns, please call the clinic at the number listed above.       Sincerely,      CORINNE Anand, FNP-BC/o

## 2019-10-04 NOTE — TELEPHONE ENCOUNTER
Reason for Call:  Other call back    Detailed comments: patient is calling stating having pain again from when ultrasound was suppose to be done and would like to discuss if can get in sooner.    Phone Number Patient can be reached at: Home number on file [phone] (home)    Best Time:     Can we leave a detailed message on this number? YES    Call taken on 10/4/2019 at 12:05 PM by Christiane Wiggins

## 2019-10-07 NOTE — RESULT ENCOUNTER NOTE
Please send letter    Ricardo Urias,    Thank you for your recent office visit.    Here are your recent results.  Per radiology- Right ovarian cyst decreased from the prior study likely  representing a normal evolving cyst. Otherwise,  normal pelvic ultrasound.    Feel free to contact me via [employer] or call the clinic at [phone].    Sincerely,    America Andre, CORINNE, FNP-BC

## 2019-11-12 DIAGNOSIS — E03.9 HYPOTHYROIDISM, UNSPECIFIED TYPE: ICD-10-CM

## 2019-11-12 NOTE — TELEPHONE ENCOUNTER
Requested Prescriptions   Pending Prescriptions Disp Refills     levothyroxine (SYNTHROID/LEVOTHROID) 50 MCG tablet 90 tablet 3     Sig: TAKE 1 TABLET(50 MCG) BY MOUTH DAILY   Last Written Prescription Date:  10-9-19  Last Fill Quantity: 90,  # refills: 0   Last office visit: 7/26/2019 with prescribing provider:  7-26-19   Future Office Visit:      There is no refill protocol information for this order

## 2019-11-13 RX ORDER — LEVOTHYROXINE SODIUM 50 UG/1
TABLET ORAL
Qty: 90 TABLET | Refills: 3 | OUTPATIENT
Start: 2019-11-13

## 2019-11-20 ENCOUNTER — OFFICE VISIT (OUTPATIENT)
Dept: FAMILY MEDICINE | Facility: CLINIC | Age: 44
End: 2019-11-20
Payer: COMMERCIAL

## 2019-11-20 VITALS
RESPIRATION RATE: 14 BRPM | HEART RATE: 71 BPM | WEIGHT: 145.1 LBS | SYSTOLIC BLOOD PRESSURE: 115 MMHG | OXYGEN SATURATION: 98 % | BODY MASS INDEX: 29.25 KG/M2 | HEIGHT: 59 IN | DIASTOLIC BLOOD PRESSURE: 79 MMHG | TEMPERATURE: 98.1 F

## 2019-11-20 DIAGNOSIS — Z32.00 PREGNANCY EXAMINATION OR TEST, PREGNANCY UNCONFIRMED: Primary | ICD-10-CM

## 2019-11-20 LAB — HCG UR QL: NEGATIVE

## 2019-11-20 PROCEDURE — 90686 IIV4 VACC NO PRSV 0.5 ML IM: CPT | Performed by: PHYSICIAN ASSISTANT

## 2019-11-20 PROCEDURE — 90471 IMMUNIZATION ADMIN: CPT | Performed by: PHYSICIAN ASSISTANT

## 2019-11-20 PROCEDURE — 99213 OFFICE O/P EST LOW 20 MIN: CPT | Mod: 25 | Performed by: PHYSICIAN ASSISTANT

## 2019-11-20 PROCEDURE — 81025 URINE PREGNANCY TEST: CPT | Performed by: PHYSICIAN ASSISTANT

## 2019-11-20 ASSESSMENT — MIFFLIN-ST. JEOR: SCORE: 1203.8

## 2019-11-20 ASSESSMENT — PAIN SCALES - GENERAL: PAINLEVEL: NO PAIN (0)

## 2019-11-20 NOTE — PATIENT INSTRUCTIONS
Patient Education     Amenorrhea     Normally, the uterine lining builds up and is shed each month during a woman s period. With amenorrhea, this process does not happen.   Menstruation occurs when a woman sheds the lining of her uterus (womb). It is also called a  period.  For most women, this happens once a month. But some women may not get their periods. This is called amenorrhea.  Amenorrhea may be due to a number of causes. These include:    Pregnancy, breastfeeding, or menopause    Hormone problems    Emotional stress    Very low body weight    Exercising too much    Poor nutrition or eating disorders    Taking certain medicines    Having certain birth defects or genetic disorders  There are 2 types of amenorrhea:    Primary amenorrhea is when a girl has not had her first period by age 15.    Secondary amenorrhea is when:  ? A girl or woman who has been having normal periods stops getting them for 3 months in a row  ? A girl or woman who has been having irregular periods stops getting them for 6 months in a row  One or more tests can be done to find out why you re not having periods. These include blood tests and imaging tests. Once the cause is found, it can be treated. Treatments can range from lifestyle and diet changes to medicines, procedures, or surgery. Your healthcare provider will discuss options with you as needed.  Follow-up care  Follow up with your healthcare provider, or as advised. You'll be told the results of any tests as soon as they are ready.   Note: Know that you can still become pregnant even if you are not having regular periods. It is important to use some form of birth control if you are sexually active and do not wish to become pregnant.   When to seek medical advice  Call your healthcare provider right way if any of these occur:    Severe pain in the abdomen (belly)    Swelling of the belly    Sudden, severe vaginal bleeding    Feeling faint or passing out  Date Last Reviewed:  10/1/2017    4005-1473 [employer]. [address] [place] [address], [place] [address]. All rights reserved. This information is not intended as a substitute for professional medical care. Always follow your healthcare professional's instructions.

## 2019-11-20 NOTE — PROGRESS NOTES
"Raffaele Hardin is a 44 year old female who presents to clinic today for the following health issues:    HPI   Amenorrhea   6 days.  LMP: 10/15/2019  Home pregnancy test is inconclusive. One was + , one was negative.  Using very regular.  Otherwise feeling well.  Hx two miscarraiges            No Known Allergies    Patient Active Problem List   Diagnosis     Hypothyroidism, unspecified type     Supervision of high-risk pregnancy of elderly multigravida     Cold sore     Hepatitis B, chronic (H)     Hepatitis C, chronic (H)     Threatened      History of miscarriage     Recurrent UTI     Missed period       No past medical history on file.    levothyroxine (SYNTHROID/LEVOTHROID) 50 MCG tablet, TAKE 1 TABLET(50 MCG) BY MOUTH DAILY  ciprofloxacin (CIPRO) 500 MG tablet, Take 1 tablet (500 mg) by mouth 2 times daily (Patient not taking: Reported on 2019)    No current facility-administered medications on file prior to visit.       Social History     Tobacco Use     Smoking status: Never Smoker     Smokeless tobacco: Never Used   Substance Use Topics     Alcohol use: No     Drug use: No       ROS:   GEN: NO fevers  GYN LMP as above  GI no n/v    OBJECTIVE:  /79 (BP Location: Left arm, Patient Position: Chair, Cuff Size: Adult Regular)   Pulse 71   Temp 98.1  F (36.7  C) (Oral)   Resp 14   Ht 1.499 m (4' 11\")   Wt 65.8 kg (145 lb 1.6 oz)   SpO2 98%   BMI 29.31 kg/m     General:   awake, alert, and cooperative.  NAD.   Head: Normocephalic, atraumatic.  Eyes: Conjunctiva clear,   Lungs: Regular rate  Neuro: Alert and oriented - normal speech.      upreg negative      ASSESSMENT:well appearing    ICD-10-CM    1. Pregnancy examination or test, pregnancy unconfirmed Z32.00 HCG qualitative urine       PLAN: repeat upreg next week  Follow up: prn   Advised about symptoms which might herald more serious problems.                "

## 2020-02-13 NOTE — PROGRESS NOTES
Subjective     Bridgett Hardin is a 44 year old female who presents to clinic today for the following health issues:    HPI     Patient would also like to check her liver and hepatitis b- hx chronic hep b and hep c, occasionally has sharp pains RUQ. Still has gallbladder.     Hypothyroidism Follow-up      Since last visit, patient describes the following symptoms: hair loss      How many servings of fruits and vegetables do you eat daily?  2-3    On average, how many sweetened beverages do you drink each day (Examples: soda, juice, sweet tea, etc.  Do NOT count diet or artificially sweetened beverages)?   0    How many days per week do you exercise enough to make your heart beat faster? 3 or less    How many minutes a day do you exercise enough to make your heart beat faster? 30 - 60    How many days per week do you miss taking your medication? 0        Patient Active Problem List   Diagnosis     Hypothyroidism, unspecified type     Supervision of high-risk pregnancy of elderly multigravida     Cold sore     Hepatitis B, chronic (H)     Hepatitis C, chronic (H)     Threatened      History of miscarriage     Recurrent UTI     Missed period     Past Surgical History:   Procedure Laterality Date     DILATION AND CURETTAGE SUCTION N/A 2018    Procedure: DILATION AND CURETTAGE SUCTION;  Suction D & C;  Surgeon: Edmond Garvin MD;  Location:  OR       Social History     Tobacco Use     Smoking status: Never Smoker     Smokeless tobacco: Never Used   Substance Use Topics     Alcohol use: No     History reviewed. No pertinent family history.      Current Outpatient Medications   Medication Sig Dispense Refill     levothyroxine (SYNTHROID/LEVOTHROID) 50 MCG tablet TAKE 1 TABLET(50 MCG) BY MOUTH DAILY 90 tablet 3     No Known Allergies  Recent Labs   Lab Test 19  1635 18  1412  18  1650   A1C  --   --   --  5.8   LDL  --   --   --  88   HDL  --   --   --  56   TRIG  --   --   --  94   ALT  --   "26  --   --    TSH 1.95 3.71   < > 5.85*    < > = values in this interval not displayed.      BP Readings from Last 3 Encounters:   02/17/20 110/77   11/20/19 115/79   07/26/19 111/76    Wt Readings from Last 3 Encounters:   02/17/20 66.6 kg (146 lb 12.8 oz)   11/20/19 65.8 kg (145 lb 1.6 oz)   07/26/19 63.2 kg (139 lb 6.4 oz)                    Reviewed and updated as needed this visit by Provider  Tobacco  Allergies  Meds  Problems  Med Hx  Surg Hx  Fam Hx         Review of Systems   ROS COMP: Constitutional, HEENT, cardiovascular, pulmonary, GI, , musculoskeletal, neuro, skin, endocrine and psych systems are negative, except as otherwise noted.      Objective    /77   Pulse 83   Temp 98.3  F (36.8  C) (Oral)   Resp 16   Ht 1.51 m (4' 11.45\")   Wt 66.6 kg (146 lb 12.8 oz)   LMP 02/16/2020 (Approximate)   SpO2 96%   Breastfeeding No   BMI 29.20 kg/m    Body mass index is 29.2 kg/m .  Physical Exam   GENERAL: healthy, alert and no distress  NECK: no adenopathy, no asymmetry, masses, or scars and thyroid normal to palpation  RESP: lungs clear to auscultation - no rales, rhonchi or wheezes  CV: regular rate and rhythm, normal S1 S2, no S3 or S4, no murmur, click or rub, no peripheral edema and peripheral pulses strong  ABDOMEN: soft, nontender, no hepatosplenomegaly, no masses and bowel sounds normal, no CVA tenderness  PSYCH: mentation appears normal, affect normal/bright    Diagnostic Test Results:  Labs reviewed in Epic  See orders        Assessment & Plan       ICD-10-CM    1. Hepatitis B, chronic (H) B18.1 Hepatitis B surface antigen     Hepatitis B core antibody     Hepatic panel     US Abdomen Limited   2. Chronic hepatitis C without hepatic coma (H) B18.2 Hepatitis C antibody     Hepatic panel     US Abdomen Limited   3. RUQ abdominal pain R10.11 Hepatitis B surface antigen     Hepatitis B core antibody     Hepatitis C antibody     Hepatic panel     US Abdomen Limited   4. " "Hypothyroidism, unspecified type E03.9 TSH with free T4 reflex     levothyroxine (SYNTHROID/LEVOTHROID) 50 MCG tablet        BMI:   Estimated body mass index is 29.2 kg/m  as calculated from the following:    Height as of this encounter: 1.51 m (4' 11.45\").    Weight as of this encounter: 66.6 kg (146 lb 12.8 oz).   Weight management plan: Discussed healthy diet and exercise guidelines        See Patient Instructions:   We will let you know your lab results and if abnormal a treatment plan for you. Follow up as needed    Return in about 3 months (around 5/17/2020), or if symptoms worsen or fail to improve.    America Andre, EDITH  [facility]    "

## 2020-02-13 NOTE — PATIENT INSTRUCTIONS
Reminders:     Please remember to arrive 5-10 minutes early for your appointments. If you are late you may need to reschedule your appointment.    If you have mychart please be aware your results and communications will be sent within your mychart. Unless results are critical and/or urgent value.     We will let you know your lab results and if abnormal a treatment plan for you. Follow up as needed

## 2020-02-17 ENCOUNTER — OFFICE VISIT (OUTPATIENT)
Dept: FAMILY MEDICINE | Facility: CLINIC | Age: 45
End: 2020-02-17
Payer: COMMERCIAL

## 2020-02-17 VITALS
RESPIRATION RATE: 16 BRPM | BODY MASS INDEX: 29.6 KG/M2 | TEMPERATURE: 98.3 F | OXYGEN SATURATION: 96 % | SYSTOLIC BLOOD PRESSURE: 110 MMHG | WEIGHT: 146.8 LBS | HEART RATE: 83 BPM | DIASTOLIC BLOOD PRESSURE: 77 MMHG | HEIGHT: 59 IN

## 2020-02-17 DIAGNOSIS — B18.2 CHRONIC HEPATITIS C WITHOUT HEPATIC COMA (H): ICD-10-CM

## 2020-02-17 DIAGNOSIS — E03.9 HYPOTHYROIDISM, UNSPECIFIED TYPE: ICD-10-CM

## 2020-02-17 DIAGNOSIS — R10.11 RUQ ABDOMINAL PAIN: ICD-10-CM

## 2020-02-17 DIAGNOSIS — B18.1 HEPATITIS B, CHRONIC (H): Primary | ICD-10-CM

## 2020-02-17 LAB
ALBUMIN SERPL-MCNC: 3.6 G/DL (ref 3.4–5)
ALP SERPL-CCNC: 85 U/L (ref 40–150)
ALT SERPL W P-5'-P-CCNC: 33 U/L (ref 0–50)
AST SERPL W P-5'-P-CCNC: 19 U/L (ref 0–45)
BILIRUB DIRECT SERPL-MCNC: <0.1 MG/DL (ref 0–0.2)
BILIRUB SERPL-MCNC: 0.5 MG/DL (ref 0.2–1.3)
PROT SERPL-MCNC: 7.6 G/DL (ref 6.8–8.8)
TSH SERPL DL<=0.005 MIU/L-ACNC: 1.47 MU/L (ref 0.4–4)

## 2020-02-17 PROCEDURE — 86704 HEP B CORE ANTIBODY TOTAL: CPT | Performed by: NURSE PRACTITIONER

## 2020-02-17 PROCEDURE — 84443 ASSAY THYROID STIM HORMONE: CPT | Performed by: NURSE PRACTITIONER

## 2020-02-17 PROCEDURE — 86803 HEPATITIS C AB TEST: CPT | Performed by: NURSE PRACTITIONER

## 2020-02-17 PROCEDURE — 87340 HEPATITIS B SURFACE AG IA: CPT | Performed by: NURSE PRACTITIONER

## 2020-02-17 PROCEDURE — 99214 OFFICE O/P EST MOD 30 MIN: CPT | Performed by: NURSE PRACTITIONER

## 2020-02-17 PROCEDURE — 87341 HEP B SURFACE AG NEUTRLZJ IA: CPT | Performed by: NURSE PRACTITIONER

## 2020-02-17 PROCEDURE — 80076 HEPATIC FUNCTION PANEL: CPT | Performed by: NURSE PRACTITIONER

## 2020-02-17 PROCEDURE — 36415 COLL VENOUS BLD VENIPUNCTURE: CPT | Performed by: NURSE PRACTITIONER

## 2020-02-17 RX ORDER — LEVOTHYROXINE SODIUM 50 UG/1
TABLET ORAL
Qty: 90 TABLET | Refills: 3 | Status: SHIPPED | OUTPATIENT
Start: 2020-02-17 | End: 2020-12-16

## 2020-02-17 ASSESSMENT — MIFFLIN-ST. JEOR: SCORE: 1218.63

## 2020-02-18 LAB
HBV CORE AB SERPL QL IA: REACTIVE
HBV SURFACE AG SERPL QL IA: REACTIVE
HCV AB SERPL QL IA: NONREACTIVE

## 2020-02-24 DIAGNOSIS — B19.10 HEPATITIS B VIRUS INFECTION, UNSPECIFIED CHRONICITY: Primary | ICD-10-CM

## 2020-02-26 ENCOUNTER — ANCILLARY PROCEDURE (OUTPATIENT)
Dept: ULTRASOUND IMAGING | Facility: CLINIC | Age: 45
End: 2020-02-26
Attending: NURSE PRACTITIONER
Payer: COMMERCIAL

## 2020-02-26 DIAGNOSIS — B18.2 CHRONIC HEPATITIS C WITHOUT HEPATIC COMA (H): ICD-10-CM

## 2020-02-26 DIAGNOSIS — B18.1 HEPATITIS B, CHRONIC (H): ICD-10-CM

## 2020-02-26 DIAGNOSIS — R10.11 RUQ ABDOMINAL PAIN: ICD-10-CM

## 2020-02-26 PROCEDURE — 76705 ECHO EXAM OF ABDOMEN: CPT

## 2020-02-27 NOTE — RESULT ENCOUNTER NOTE
Ricardo Urias,    Thank you for your recent office visit.    Here are your recent results.  Per radiology-                                                                  IMPRESSION: Fatty liver suggested. No focal hepatic lesion identified. Otherwise normal ultrasound results. Follow up if your symptoms persist or worsen.     Feel free to contact me via [employer] or call the clinic at [phone].    Sincerely,    America Andre, CORINNE, FNP-BC

## 2020-03-11 ENCOUNTER — HEALTH MAINTENANCE LETTER (OUTPATIENT)
Age: 45
End: 2020-03-11

## 2020-12-15 NOTE — PROGRESS NOTES
"Bridgett Hardin is a 45 year old female who is being evaluated via a billable telephone visit.      The patient has been notified of following:     \"This telephone visit will be conducted via a call between you and your physician/provider. We have found that certain health care needs can be provided without the need for a physical exam.  This service lets us provide the care you need with a short phone conversation.  If a prescription is necessary we can send it directly to your pharmacy.  If lab work is needed we can place an order for that and you can then stop by our lab to have the test done at a later time.    Telephone visits are billed at different rates depending on your insurance coverage. During this emergency period, for some insurers they may be billed the same as an in-person visit.  Please reach out to your insurance provider with any questions.    If during the course of the call the physician/provider feels a telephone visit is not appropriate, you will not be charged for this service.\"    Patient has given verbal consent for Telephone visit?  Yes    What phone number would you like to be contacted at? [phone]    How would you like to obtain your AVS? MyChart    Subjective     Bridgett Hardin is a 45 year old female who presents via phone visit today for the following health issues:    HPI     Vaginal Symptoms  Onset/Duration: x2wks  Description:  Vaginal Discharge: none   Itching (Pruritis): no  Burning sensation:  no  Odor: YES- fishy odor  Accompanying Signs & Symptoms:  Urinary symptoms: no  Abdominal pain: no  Fever: no  History:   Sexually active: YES  New Partner: no  Possibility of Pregnancy:  no  Recent antibiotic use: no  Previous vaginitis issues: YES  Precipitating or alleviating factors: None  Therapies tried and outcome: none      Review of Systems   Constitutional, HEENT, cardiovascular, pulmonary, GI, , musculoskeletal, neuro, skin, endocrine and psych systems are negative, except as " "otherwise noted.       Objective          Vitals:  No vitals were obtained today due to virtual visit.    healthy, alert and no distress  PSYCH: Alert and oriented times 3; coherent speech, normal   rate and volume, able to articulate logical thoughts, able   to abstract reason, no tangential thoughts, no hallucinations   or delusions  Her affect is normal  RESP: No cough, no audible wheezing, able to talk in full sentences  Remainder of exam unable to be completed due to telephone visits    See orders- patient reports hair loss- advised lab visit for TSH/T4, she is in need for levothyroxine refills- will send in refill x1, awaiting lab results.         Assessment/Plan:    Assessment & Plan     Diagnoses and all orders for this visit:    Hypothyroidism, unspecified type  -     levothyroxine (SYNTHROID/LEVOTHROID) 50 MCG tablet; TAKE 1 TABLET(50 MCG) BY MOUTH DAILY  -     **TSH with free T4 reflex FUTURE anytime; Future    Bacterial vaginosis  -     metroNIDAZOLE (FLAGYL) 500 MG tablet; Take 1 tablet (500 mg) by mouth 2 times daily for 7 days         BMI:   Estimated body mass index is 29.2 kg/m  as calculated from the following:    Height as of 2/17/20: 1.51 m (4' 11.45\").    Weight as of 2/17/20: 66.6 kg (146 lb 12.8 oz).   Weight management plan: Discussed healthy diet and exercise guidelines         Follow up as needed discussed for persistent or worsening- advised to review AVS for prevention tips.     Return in about 1 week (around 12/23/2020), or if symptoms worsen or fail to improve.    EDITH Frank  [facility]    Phone call duration:  13 minutes                "

## 2020-12-16 ENCOUNTER — VIRTUAL VISIT (OUTPATIENT)
Dept: FAMILY MEDICINE | Facility: CLINIC | Age: 45
End: 2020-12-16
Payer: COMMERCIAL

## 2020-12-16 DIAGNOSIS — B96.89 BACTERIAL VAGINOSIS: ICD-10-CM

## 2020-12-16 DIAGNOSIS — E03.9 HYPOTHYROIDISM, UNSPECIFIED TYPE: Primary | ICD-10-CM

## 2020-12-16 DIAGNOSIS — N76.0 BACTERIAL VAGINOSIS: ICD-10-CM

## 2020-12-16 PROCEDURE — 99214 OFFICE O/P EST MOD 30 MIN: CPT | Mod: 95 | Performed by: NURSE PRACTITIONER

## 2020-12-16 RX ORDER — LEVOTHYROXINE SODIUM 50 UG/1
TABLET ORAL
Qty: 90 TABLET | Refills: 0 | Status: SHIPPED | OUTPATIENT
Start: 2020-12-16 | End: 2021-02-17

## 2020-12-16 RX ORDER — METRONIDAZOLE 500 MG/1
500 TABLET ORAL 2 TIMES DAILY
Qty: 14 TABLET | Refills: 0 | Status: SHIPPED | OUTPATIENT
Start: 2020-12-16 | End: 2020-12-23

## 2020-12-16 NOTE — PATIENT INSTRUCTIONS
Patient Education     Bacterial Vaginosis    You have a vaginal infection called bacterial vaginosis (BV). Both good and bad bacteria are present in a healthy vagina. BV occurs when these bacteria get out of balance. The number of bad bacteria increase. And the number of good bacteria decrease. Although BV is associated with sexual activity, it is not a sexually transmitted disease.  BV may or may not cause symptoms. If symptoms do occur, they can include:    Thin, gray, milky-white, or sometimes green discharge    Unpleasant odor or  fishy  smell    Itching, burning, or pain in or around the vagina  It is not known what causes BV, but certain factors can make the problem more likely. This can include:    Douching    Having sex with a new partner    Having sex with more than one partner  BV will sometimes go away on its own. But treatment is usually recommended. This is because untreated BV can increase the risk of more serious health problems such as:    Pelvic inflammatory disease (PID)     delivery (giving birth to a baby early if you re pregnant)    HIV and certain other sexually transmitted diseases (STDs)    Infection after surgery on the reproductive organs  Home care  General care    BV is most often treated with medicines called antibiotics. These may be given as pills or as a vaginal cream. If antibiotics are prescribed, be sure to use them exactly as directed. Also, be sure to complete all of the medicine, even if your symptoms go away.    Don't douche or having sex during treatment.    If you have sex with a female partner, ask your healthcare provider if she should also be treated.  Prevention    Don't douche.    Don't have sex. If you do have sex, then take steps to lower your risk:  ? Use condoms when having sex.  ? Limit the number of sexual partners you have.  Follow-up care  Follow up with your healthcare provider, or as advised.  When to seek medical advice  Call your healthcare provider  right away if:    You have a fever of 100.4 F (38 C) or higher, or as directed by your provider.    Your symptoms worsen, or they don t go away within a few days of starting treatment.    You have new pain in the lower belly or pelvic region.    You have side effects that bother you or a reaction to the pills or cream you re prescribed.    You or any partners you have sex with have new symptoms, such as a rash, joint pain, or sores.  [employer] last reviewed this educational content on 10/1/2017    3522-4929 [employer]. [address] [place] [address]. All rights reserved. This information is not intended as a substitute for professional medical care. Always follow your healthcare professional's instructions.           Patient Education     Preventing Vaginal Infection  These steps can help you stay comfortable during treatment of a vaginal infection. They also help prevent vaginal infections in the future.  Keeping a healthy balance  Factors that change the normal balance in the vagina can lead to a vaginal infection. To help keep the balance normal, try these tips:    Change out of wet bathing suits and damp exercise clothes as soon as possible. Yeast thrive in a warm, moist environment.    Avoid wearing tight pants. Choose cotton underwear and pantyhose that have a cotton crotch. Cotton keeps you cooler and drier than synthetics.    Don't douche unless directed by your healthcare provider. Douching can destroy friendly bacteria and change the vagina's normal balance.    Wipe from front to back after using the toilet. This prevents bacteria from spreading from the anus to the vulva.    Wash the vulva with mild, unscented soap or with plain water.    Wash your diaphragm, spermicide applicators, and sex toys with mild soap and water after use. Dry them thoroughly before putting them away.    Change tampons often (every 2 hours to 4 hours). Leaving a tampon in for too long may disrupt the  balance of vaginal bacteria.    Avoid vaginal sprays, scented toilet paper and soaps, and deodorant tampons or pads, which can cause vaginal irritation  Staying healthy overall  Good overall health can help you resist infection. To be healthier:    Help protect yourself from STIs (sexually transmitted infections) by using latex condoms for intercourse. Ask your healthcare provider for more information about safer sex.    Eat a variety of healthy foods.    Exercise regularly.    Get enough rest and sleep.    Maintain a healthy weight. If you need to lose weight, ask your healthcare provider for advice on how to start.  [employer] last reviewed this educational content on 9/1/2017 2000-2020 [employer]. [address] [place] [address], [place] [address]. All rights reserved. This information is not intended as a substitute for professional medical care. Always follow your healthcare professional's instructions.           Patient Education     Hyperthyroidism    You have hyperthyroidism. This means you have a thyroid gland that makes too much thyroid hormone. This hormone is vital to body growth and metabolism. If you make too much thyroid hormone, many body processes speed up and may not work right. This can cause symptoms throughout the body.  There are a number of causes of hyperthyroidism. The most common cause is Grave s disease. This occurs when the body s immune system causes the thyroid to grow and make more thyroid hormone than needed. Another form of hypothyroidism, called postpartum thyroiditis, occurs shortly after childbirth.  Symptoms of hyperthyroidism include:    Nervousness, anxiety, irritability    Shaking (tremors) that affects the hands and fingers    Weight loss despite having a normal or increased appetite    Low tolerance to heat    Sweating more than normal    Fast or irregular heartbeat    Lighter or irregular periods (women only)    More frequent bowel movements    Enlarged thyroid gland  (goiter)    Bulging eyes    Problems sleeping    Muscle weakness    Fatigue    Swelling of the hands, ankles, or feet (older adults only)  Your healthcare provider will need to do some tests to see exactly which form of hypothyroidism you have. This is because the treatments are different. Treatment for hyperthyroidism may include taking medicines. For instance, antithyroid medicines may be prescribed. These help lower the amount of thyroid hormone made by the thyroid gland. Beta-blockers may be prescribed as well. Tips for taking medicines are given below.  Radioiodine ablation or surgery may also be advised. Your healthcare provider will tell you more about these options if needed.  Home care  Tips for taking medicines    Take any medicines you re prescribed as directed.    Take your medicine at the same times each day.    To decrease the chance of drug interactions, check with your pharmacist before using over-the-counter medicines with your prescribed medicines.    Use a pillbox labeled with the days of the week. This will help you remember to take your medicine each day.    Tell your provider if you have any side effects from your medicines that bother you.    Never stop taking medicines on your own. If you do, your symptoms will return.  General care    Always talk with your provider before trying other medicines or treatments for your thyroid problem.    If you see other healthcare providers, be sure to let them know about your thyroid problem.    Follow-up care  See your healthcare provider for checkups as advised. You may need regular tests to check the level of thyroid hormone in your blood.  When to seek medical advice  Call your healthcare provider right away if any of these occur:    New symptoms occur    Symptoms return, continue, or worsen even after treatment    Extreme fatigue    Puffy hands, face, or feet    Confusion  Call 911  Call 911 if any of these occur:    Fainting    Chest pain    Shortness  of breath or trouble breathing  [employer] last reviewed this educational content on 4/1/2018 2000-2020 [employer]. [address] [place] [address], [place] [address]. All rights reserved. This information is not intended as a substitute for professional medical care. Always follow your healthcare professional's instructions.           Patient Education     Hypothyroidism    You have hypothyroidism. This means your thyroid gland is not making enough thyroid hormone. This hormone is vital to body growth and metabolism. If you don t make enough, many body processes slow down. This can cause symptoms throughout the body. Hypothyroidism can range from mild to severe. The most severe form is called myxedema.  There are a number of causes of hypothyroidism. A common cause is Hashimoto s disease. This disease causes the body s own immune system to attack the thyroid gland. When you have certain treatments, such as surgery to remove the thyroid gland, this can also cause hypothyroidism. Sometimes the thyroid gland is not functioning because of lack of stimulation from the pituitary gland.  Symptoms of hypothyroidism can include:    Fatigue    Trouble concentrating or thinking clearly; forgetfulness    Dry skin    Hair loss    Weight gain    Low tolerance to cold    Constipation    Depression    Personality changes    Tingling or prickling of the hands or feet    Heavy, absent, or irregular periods (women only)  Older adults may sometimes have other symptoms. These can include:    Muscle aches and weakness    Confusion    Incontinence (unable to control urine or stool)    Trouble moving around    Falling  Treatment for hypothyroidism involves taking thyroid hormone pills daily. These pills replace the hormone your thyroid doesn t make. You will likely need to take a daily pill for the rest of your life. Tips for taking this medicine are given below.  Home care  Tips for taking your medicine    Take your thyroid  hormone pills as prescribed by your healthcare provider. This is most often 1 pill a day on an empty stomach. Use a pillbox labeled with the days of the week. This will help you remember to take your pill each day.    Don t take products that contain iron and calcium or antacids within 4 hours of taking your thyroid hormone pills.    Don t take other medicines with your thyroid hormone pill without checking with your provider first.    Tell your provider if you have any side effects from your medicines that bother you, especially any chest pain or irregular heartbeats.    Never change the dosage or stop taking your thyroid pills without talking to your provider first.  General care    Always talk with your provider before trying other medicines or treatments for your thyroid problem.    If you see other healthcare providers, be sure to let them know about your thyroid problem.    Let your healthcare provider know if you become pregnant because your dose of thyroid hormone will need to be adjusted.  Follow-up care  See your healthcare provider for checkups as advised. You may need regular tests to check the level of thyroid hormone in your blood.  When to seek medical advice  Call your healthcare provider right away if any of these occur:    New symptoms develop    Symptoms return, continue, or worsen even after treatment    Extreme fatigue    Puffy hands, face, or feet    Fast or irregular heartbeat    Confusion  Call 911  Call 911 if any of these occur:    Fainting    Chest pain    Shortness of breath or trouble breathing  [employer]phan[employer] last reviewed this educational content on 4/1/2018 2000-2020 [employer]. [address] [place] [address], [place] [address]. All rights reserved. This information is not intended as a substitute for professional medical care. Always follow your healthcare professional's instructions.

## 2020-12-17 DIAGNOSIS — E03.9 HYPOTHYROIDISM, UNSPECIFIED TYPE: ICD-10-CM

## 2020-12-17 LAB — TSH SERPL DL<=0.005 MIU/L-ACNC: 2.02 MU/L (ref 0.4–4)

## 2020-12-17 PROCEDURE — 84443 ASSAY THYROID STIM HORMONE: CPT | Performed by: NURSE PRACTITIONER

## 2020-12-17 PROCEDURE — 36415 COLL VENOUS BLD VENIPUNCTURE: CPT | Performed by: NURSE PRACTITIONER

## 2021-02-16 DIAGNOSIS — E03.9 HYPOTHYROIDISM, UNSPECIFIED TYPE: ICD-10-CM

## 2021-02-17 RX ORDER — LEVOTHYROXINE SODIUM 50 UG/1
TABLET ORAL
Qty: 90 TABLET | Refills: 0 | Status: SHIPPED | OUTPATIENT
Start: 2021-02-17 | End: 2021-09-09

## 2021-02-17 NOTE — TELEPHONE ENCOUNTER
"Prescription approved per [facility] Refill Protocol.  Requested Prescriptions   Pending Prescriptions Disp Refills     levothyroxine (SYNTHROID/LEVOTHROID) 50 MCG tablet 90 tablet 0     Sig: TAKE 1 TABLET(50 MCG) BY MOUTH DAILY       Thyroid Protocol Passed - 2/16/2021  2:37 PM        Passed - Patient is 12 years or older        Passed - Recent (12 mo) or future (30 days) visit within the authorizing provider's specialty     Patient has had an office visit with the authorizing provider or a provider within the authorizing providers department within the previous 12 mos or has a future within next 30 days. See \"Patient Info\" tab in inbasket, or \"Choose Columns\" in Meds & Orders section of the refill encounter.              Passed - Medication is active on med list        Passed - Normal TSH on file in past 12 months     Recent Labs   Lab Test 12/17/20  1542   TSH 2.02              Passed - No active pregnancy on record     If patient is pregnant or has had a positive pregnancy test, please check TSH.          Passed - No positive pregnancy test in past 12 months     If patient is pregnant or has had a positive pregnancy test, please check TSH.               "

## 2021-03-06 ENCOUNTER — HEALTH MAINTENANCE LETTER (OUTPATIENT)
Age: 46
End: 2021-03-06

## 2021-04-25 ENCOUNTER — HEALTH MAINTENANCE LETTER (OUTPATIENT)
Age: 46
End: 2021-04-25

## 2021-09-07 DIAGNOSIS — E03.9 HYPOTHYROIDISM, UNSPECIFIED TYPE: ICD-10-CM

## 2021-09-09 RX ORDER — LEVOTHYROXINE SODIUM 50 UG/1
TABLET ORAL
Qty: 90 TABLET | Refills: 0 | Status: SHIPPED | OUTPATIENT
Start: 2021-09-09 | End: 2022-01-21

## 2021-09-23 NOTE — RESULT ENCOUNTER NOTE
Ricardo Urias,    Thank you for your recent office visit.    Here are your recent results.  Your labs still show reactive for hepatitis B.  Please schedule with infectious disease to determine if any treatment is needed for you at this time.     Feel free to contact me via [employer] or call the clinic at [phone].    Sincerely,    America Andre, CORINNE, FNP-BC    
REGIONAL

## 2021-10-10 ENCOUNTER — HEALTH MAINTENANCE LETTER (OUTPATIENT)
Age: 46
End: 2021-10-10

## 2022-01-18 NOTE — PROGRESS NOTES
SUBJECTIVE:   CC: Bridgett Hardin is an 46 year old woman who presents for preventive health visit.       Patient has been advised of split billing requirements and indicates understanding: Yes  Healthy Habits:     Getting at least 3 servings of Calcium per day:  Yes    Bi-annual eye exam:  Yes    Dental care twice a year:  Yes    Sleep apnea or symptoms of sleep apnea:  None    Diet:  Regular (no restrictions)    Frequency of exercise:  2-3 days/week    Duration of exercise:  15-30 minutes    Taking medications regularly:  Yes    Medication side effects:  None    PHQ-2 Total Score: 0    Additional concerns today:  Yes     Patient would still like to discuss the following concern(s):  1. Medication check- thyroid  2. Hep b check and referral to infectious disease  3. Patients  wants to have another baby.  She believes her age is around 38; her parents increased her age when they came to the [place].  Having trouble conceiving.  Pt has had 3 previous kids,  has one from previous marriage.       Today's PHQ-2 Score:   PHQ-2 ( 1999 [employer]) 1/21/2022   Q1: Little interest or pleasure in doing things 0   Q2: Feeling down, depressed or hopeless 0   PHQ-2 Score 0   PHQ-2 Total Score (12-17 Years)- Positive if 3 or more points; Administer PHQ-A if positive -   Q1: Little interest or pleasure in doing things Not at all   Q2: Feeling down, depressed or hopeless Not at all   PHQ-2 Score 0       Abuse: Current or Past (Physical, Sexual or Emotional) - No  Do you feel safe in your environment? Yes    Have you ever done Advance Care Planning? (For example, a Health Directive, POLST, or a discussion with a medical provider or your loved ones about your wishes): No, advance care planning information given to patient to review.  Patient declined advance care planning discussion at this time.    Social History     Tobacco Use     Smoking status: Never Smoker     Smokeless tobacco: Never Used   Substance Use Topics     Alcohol  use: No         Alcohol Use 2022   Prescreen: >3 drinks/day or >7 drinks/week? No       Reviewed orders with patient.  Reviewed health maintenance and updated orders accordingly - Yes  Lab work is in process  Labs reviewed in EPIC  BP Readings from Last 3 Encounters:   22 135/89   20 110/77   19 115/79    Wt Readings from Last 3 Encounters:   22 68 kg (150 lb)   20 66.6 kg (146 lb 12.8 oz)   19 65.8 kg (145 lb 1.6 oz)                  Patient Active Problem List   Diagnosis     Hypothyroidism, unspecified type     Supervision of high-risk pregnancy of elderly multigravida     Cold sore     Hepatitis B, chronic (H)     Hepatitis C, chronic (H)     Threatened      History of miscarriage     Recurrent UTI     Missed period     Past Surgical History:   Procedure Laterality Date     DILATION AND CURETTAGE SUCTION N/A 2018    Procedure: DILATION AND CURETTAGE SUCTION;  Suction D & C;  Surgeon: Edmond Garvin MD;  Location:  OR       Social History     Tobacco Use     Smoking status: Never Smoker     Smokeless tobacco: Never Used   Substance Use Topics     Alcohol use: No     History reviewed. No pertinent family history.      Current Outpatient Medications   Medication Sig Dispense Refill     levothyroxine (SYNTHROID/LEVOTHROID) 50 MCG tablet Take 1 tablet (50 mcg) by mouth daily 90 tablet 3     No Known Allergies  Recent Labs   Lab Test 20  1542 20  1001 19  1635 18  1412 18  1111 18  1650   A1C  --   --   --   --   --  5.8   LDL  --   --   --   --   --  88   HDL  --   --   --   --   --  56   TRIG  --   --   --   --   --  94   ALT  --  33  --  26  --   --    TSH 2.02 1.47   < > 3.71   < > 5.85*    < > = values in this interval not displayed.        Breast Cancer Screening:  Any new diagnosis of family breast, ovarian, or bowel cancer? No    FHS-7: No flowsheet data found.  Mammogram Screening - Offered annual screening and  updated Health Maintenance for mutual plan based on risk factor consideration    Pertinent mammograms are reviewed under the imaging tab.    History of abnormal Pap smear: NO - age 30-65 PAP every 5 years with negative HPV co-testing recommended  PAP / HPV Latest Ref Rng & Units 2018   PAP (Historical) - NIL   HPV16 NEG:Negative Negative   HPV18 NEG:Negative Negative   HRHPV NEG:Negative Negative     Reviewed and updated as needed this visit by clinical staff  Tobacco  Allergies  Meds  Problems  Med Hx  Surg Hx  Fam Hx  Soc Hx         Reviewed and updated as needed this visit by Provider  Tobacco  Allergies  Meds  Problems  Med Hx  Surg Hx  Fam Hx        History reviewed. No pertinent past medical history.   Past Surgical History:   Procedure Laterality Date     DILATION AND CURETTAGE SUCTION N/A 2018    Procedure: DILATION AND CURETTAGE SUCTION;  Suction D & C;  Surgeon: Edmond Garvin MD;  Location: [facility] OR     OB History    Para Term  AB Living   5 3 3 0 2 3   SAB IAB Ectopic Multiple Live Births   2 0 0 0 0      # Outcome Date GA Lbr Ivan/2nd Weight Sex Delivery Anes PTL Lv   5 SAB 19           4 SAB 18 11w1d    AB, MISSED Local        Complications: History of D&C   3 Term 10/21/14   3.005 kg (6 lb 10 oz) F       2 Term 13   3.884 kg (8 lb 9 oz) M   N    1 Term 07   3.459 kg (7 lb 10 oz) M           Review of Systems  CONSTITUTIONAL: NEGATIVE for fever, chills, change in weight  INTEGUMENTARU/SKIN: NEGATIVE for worrisome rashes, moles or lesions  EYES: NEGATIVE for vision changes or irritation  ENT: NEGATIVE for ear, mouth and throat problems  RESP: NEGATIVE for significant cough or SOB  BREAST: NEGATIVE for masses, tenderness or discharge  CV: NEGATIVE for chest pain, palpitations or peripheral edema  GI: NEGATIVE for nausea, abdominal pain, heartburn, or change in bowel habits  : NEGATIVE for unusual urinary or vaginal symptoms. Periods are  "regular.  MUSCULOSKELETAL: NEGATIVE for significant arthralgias or myalgia  NEURO: NEGATIVE for weakness, dizziness or paresthesias  ENDOCRINE: NEGATIVE for temperature intolerance, skin/hair changes  HEME/ALLERGY/IMMUNE: NEGATIVE for bleeding problems  PSYCHIATRIC: NEGATIVE for changes in mood or affect     OBJECTIVE:   /89   Pulse 63   Temp 98.3  F (36.8  C) (Tympanic)   Resp 20   Ht 1.505 m (4' 11.25\")   Wt 68 kg (150 lb)   LMP 01/09/2022 (Approximate)   SpO2 100%   Breastfeeding No   BMI 30.04 kg/m    Physical Exam  GENERAL: healthy, alert and no distress  EYES: Eyes grossly normal to inspection, PERRL and conjunctivae and sclerae normal  HENT: ear canals and TM's normal, nose and mouth without ulcers or lesions  NECK: no adenopathy, no asymmetry, masses, or scars and thyroid normal to palpation  RESP: lungs clear to auscultation - no rales, rhonchi or wheezes  BREAST: deferred per guidelines asymptomatic per pt  CV: regular rate and rhythm, normal S1 S2, no S3 or S4, no murmur, click or rub, no peripheral edema and peripheral pulses strong  ABDOMEN: soft, nontender, no hepatosplenomegaly, no masses and bowel sounds normal   (female): deferred per pt- no concerns  MS: no gross musculoskeletal defects noted, no edema, no CVA tenderness  SKIN: no suspicious lesions or rashes  NEURO: Normal strength and tone, cranial nerves intact, mentation intact and speech normal  PSYCH: mentation appears normal, affect normal/bright  LYMPH: no cervical, supraclavicular, axillary adenopathy    Diagnostic Test Results:  Labs reviewed in Epic  See orders    Patient would like her hep b checked, her sisters were put on medication and their hepatitis resolved. Referral to infectious disease placed.     ASSESSMENT/PLAN:       ICD-10-CM    1. Routine general medical examination at a health care facility  Z00.00    2. Advance care planning  Z71.89    3. Hypothyroidism, unspecified type  E03.9 TSH with free T4 reflex    " " levothyroxine (SYNTHROID/LEVOTHROID) 50 MCG tablet     TSH with free T4 reflex   4. History of hepatitis B  Z86.19 Infectious Disease Referral     Hepatitis B surface antigen     Hepatitis B Surface Antibody     Hepatitis B core antibody     Hepatitis B core antibody     Hepatitis B Surface Antibody     Hepatitis B surface antigen   5. Infertility counseling  Z31.69 Infertility/AI Referral   6. Lipid screening  Z13.220 Lipid panel reflex to direct LDL Fasting     Lipid panel reflex to direct LDL Fasting   7. Encounter for screening mammogram for breast cancer  Z12.31 *MA Screening Digital Bilateral   8. Screening for diabetes mellitus  Z13.1 GLUCOSE     GLUCOSE       Patient has been advised of split billing requirements and indicates understanding: Yes  COUNSELING:  Reviewed preventive health counseling, as reflected in patient instructions    Estimated body mass index is 30.04 kg/m  as calculated from the following:    Height as of this encounter: 1.505 m (4' 11.25\").    Weight as of this encounter: 68 kg (150 lb).    Weight management plan: Discussed healthy diet and exercise guidelines    She reports that she has never smoked. She has never used smokeless tobacco.      Counseling Resources:  ATP IV Guidelines  Pooled Cohorts Equation Calculator  Breast Cancer Risk Calculator  BRCA-Related Cancer Risk Assessment: FHS-7 Tool  FRAX Risk Assessment  ICSI Preventive Guidelines  Dietary Guidelines for Americans, 2010  USDA's MyPlate  ASA Prophylaxis  Lung CA Screening    EDITH Frank  [facility]  "

## 2022-01-18 NOTE — PATIENT INSTRUCTIONS
Preventive Health Recommendations  Female Ages 40 to 49    Yearly exam:     See your health care provider every year in order to  1. Review health changes.   2. Discuss preventive care.    3. Review your medicines if your doctor prescribed any.      Get a Pap test every three years (unless you have an abnormal result and your provider advises testing more often).      If you get Pap tests with HPV test, you only need to test every 5 years, unless you have an abnormal result. You do not need a Pap test if your uterus was removed (hysterectomy) and you have not had cancer.      You should be tested each year for STDs (sexually transmitted diseases), if you're at risk.     Ask your doctor if you should have a mammogram.      Have a colonoscopy (test for colon cancer) if someone in your family has had colon cancer or polyps before age 50.       Have a cholesterol test every 5 years.       Have a diabetes test (fasting glucose) after age 45. If you are at risk for diabetes, you should have this test every 3 years.    Shots: Get a flu shot each year. Get a tetanus shot every 10 years.     Nutrition:     Eat at least 5 servings of fruits and vegetables each day.    Eat whole-grain bread, whole-wheat pasta and brown rice instead of white grains and rice.    Get adequate Calcium and Vitamin D.      Lifestyle    Exercise at least 150 minutes a week (an average of 30 minutes a day, 5 days a week). This will help you control your weight and prevent disease.    Limit alcohol to one drink per day.    No smoking.     Wear sunscreen to prevent skin cancer.    See your dentist every six months for an exam and cleaning.  Patient Education   Please make an appointment to follow up with your therapist and/or Psychiatric Clinic (phone: [phone]) within 1-3 days.     Return to the ED if you are having worsening thoughts/feelings of harming yourself or others, or any urgent/life-threatening concerns.     DISCHARGE  RESOURCES:  [facility] [phone]   [facility] [phone] (detox), [phone] (outpatient & [facility])    Crisis Intervention: 763.650.4067 or 959-274-6508 (TTY: 781.642.8938).  Call anytime.  Suicide Awareness Voices of Education (SAVE) (www.save.org): 038-709-ZLQI (8026)  National Suicide Prevention Line (www.mentalhealthmn.org): 571-314-SSZQ (5165)  National Wesley on Mental Illness (www.mn.magno.org): 157.488.4237 or 537-640-5321.  Hgom8jups: text the word LIFE to 71698 for immediate support and crisis intervention  Mental Health Consumer/Survivor Network of [place] (www.mhcsn.net): 959.622.5381 or 729-118-4653  Mental Health Association of [place] (www.mentalhealth.org): 440.707.6467 or 827-777-6772

## 2022-01-21 ENCOUNTER — OFFICE VISIT (OUTPATIENT)
Dept: FAMILY MEDICINE | Facility: CLINIC | Age: 47
End: 2022-01-21
Payer: COMMERCIAL

## 2022-01-21 VITALS
HEIGHT: 59 IN | WEIGHT: 150 LBS | SYSTOLIC BLOOD PRESSURE: 135 MMHG | DIASTOLIC BLOOD PRESSURE: 89 MMHG | HEART RATE: 63 BPM | BODY MASS INDEX: 30.24 KG/M2 | RESPIRATION RATE: 20 BRPM | OXYGEN SATURATION: 100 % | TEMPERATURE: 98.3 F

## 2022-01-21 DIAGNOSIS — Z71.89 ADVANCE CARE PLANNING: ICD-10-CM

## 2022-01-21 DIAGNOSIS — Z12.31 ENCOUNTER FOR SCREENING MAMMOGRAM FOR BREAST CANCER: ICD-10-CM

## 2022-01-21 DIAGNOSIS — E03.9 HYPOTHYROIDISM, UNSPECIFIED TYPE: ICD-10-CM

## 2022-01-21 DIAGNOSIS — Z31.69 INFERTILITY COUNSELING: ICD-10-CM

## 2022-01-21 DIAGNOSIS — Z86.19 HISTORY OF HEPATITIS B: ICD-10-CM

## 2022-01-21 DIAGNOSIS — Z13.220 LIPID SCREENING: ICD-10-CM

## 2022-01-21 DIAGNOSIS — Z13.1 SCREENING FOR DIABETES MELLITUS: ICD-10-CM

## 2022-01-21 DIAGNOSIS — B18.1 HEPATITIS B, CHRONIC (H): ICD-10-CM

## 2022-01-21 DIAGNOSIS — Z00.00 ROUTINE GENERAL MEDICAL EXAMINATION AT A HEALTH CARE FACILITY: Primary | ICD-10-CM

## 2022-01-21 LAB
CHOLEST SERPL-MCNC: 163 MG/DL
FASTING STATUS PATIENT QL REPORTED: YES
FASTING STATUS PATIENT QL REPORTED: YES
GLUCOSE BLD-MCNC: 101 MG/DL (ref 70–99)
HDLC SERPL-MCNC: 54 MG/DL
LDLC SERPL CALC-MCNC: 83 MG/DL
NONHDLC SERPL-MCNC: 109 MG/DL
T4 FREE SERPL-MCNC: 0.83 NG/DL (ref 0.76–1.46)
TRIGL SERPL-MCNC: 130 MG/DL
TSH SERPL DL<=0.005 MIU/L-ACNC: 5.17 MU/L (ref 0.4–4)

## 2022-01-21 PROCEDURE — 87340 HEPATITIS B SURFACE AG IA: CPT | Performed by: NURSE PRACTITIONER

## 2022-01-21 PROCEDURE — 36415 COLL VENOUS BLD VENIPUNCTURE: CPT | Performed by: NURSE PRACTITIONER

## 2022-01-21 PROCEDURE — 99214 OFFICE O/P EST MOD 30 MIN: CPT | Mod: 25 | Performed by: NURSE PRACTITIONER

## 2022-01-21 PROCEDURE — 80061 LIPID PANEL: CPT | Performed by: NURSE PRACTITIONER

## 2022-01-21 PROCEDURE — 84439 ASSAY OF FREE THYROXINE: CPT | Performed by: NURSE PRACTITIONER

## 2022-01-21 PROCEDURE — 99396 PREV VISIT EST AGE 40-64: CPT | Performed by: NURSE PRACTITIONER

## 2022-01-21 PROCEDURE — 82947 ASSAY GLUCOSE BLOOD QUANT: CPT | Performed by: NURSE PRACTITIONER

## 2022-01-21 PROCEDURE — 84443 ASSAY THYROID STIM HORMONE: CPT | Performed by: NURSE PRACTITIONER

## 2022-01-21 PROCEDURE — 86706 HEP B SURFACE ANTIBODY: CPT | Performed by: NURSE PRACTITIONER

## 2022-01-21 PROCEDURE — 87341 HEP B SURFACE AG NEUTRLZJ IA: CPT | Performed by: NURSE PRACTITIONER

## 2022-01-21 PROCEDURE — 86704 HEP B CORE ANTIBODY TOTAL: CPT | Performed by: NURSE PRACTITIONER

## 2022-01-21 RX ORDER — LEVOTHYROXINE SODIUM 50 UG/1
50 TABLET ORAL DAILY
Qty: 90 TABLET | Refills: 3 | Status: SHIPPED | OUTPATIENT
Start: 2022-01-21 | End: 2022-03-04

## 2022-01-21 ASSESSMENT — ENCOUNTER SYMPTOMS
HEMATOCHEZIA: 0
DIARRHEA: 0
HEMATURIA: 0
CONSTIPATION: 0
DIZZINESS: 0
ABDOMINAL PAIN: 0
CHILLS: 0
COUGH: 0

## 2022-01-21 ASSESSMENT — MIFFLIN-ST. JEOR: SCORE: 1220.03

## 2022-01-21 ASSESSMENT — PAIN SCALES - GENERAL: PAINLEVEL: NO PAIN (0)

## 2022-01-24 LAB
HBV CORE AB SERPL QL IA: REACTIVE
HBV SURFACE AB SERPL IA-ACNC: 0 M[IU]/ML
HBV SURFACE AG SERPL QL IA: REACTIVE

## 2022-01-27 NOTE — RESULT ENCOUNTER NOTE
Ricardo Urias,    Thank you for your recent office visit.    Here are your recent results.  Please schedule with infectious disease to discuss your hepatitis b labs and treatment if needed    Feel free to contact me via "[employer] or call the clinic at [phone].    Sincerely,    CORINNE Anand, FNP-BC    
immune

## 2022-02-23 NOTE — PROGRESS NOTES
[facility]  Infertility Visit   2022    Chief Complaint: Discuss fertility     History of Present Illness:  Bridgett Hardin is a 42-43 year old (age incorrectly assigned on immigration)  who presents for infertility consultation. She was remarried in  and has had two miscarriages the first requiring a D&C. Had to see a fertility specialist and used Clomid to conceive her second child. Spontaneous pregnancies with first and third child.    Patient reports attempting conception with regular, unprotected intercourse. Reports unprotected intercourse approximately 1-2 times weekly. Has been tracking her periods with a calendar and attempting to time intercourse. Reports  had a normal semen analysis. Her partner has 1 child with another partner.    Denies fevers, chills, vision changes, headaches, hot flashes, night sweats, vaginal dryness, mood disturbance, changes in vaginal discharge, galactorrhea/nipple discharge, hirsutism.     Obstetric History:  OB History    Para Term  AB Living   5 3 3 0 2 3   SAB IAB Ectopic Multiple Live Births   2 0 0 0 0      # Outcome Date GA Lbr Ivan/2nd Weight Sex Delivery Anes PTL Lv   5 SAB 19           4 SAB 18 11w1d    AB, MISSED Local        Complications: History of D&C   3 Term 10/21/14   3.005 kg (6 lb 10 oz) F       2 Term 13   3.884 kg (8 lb 9 oz) M   N    1 Term 07   3.459 kg (7 lb 10 oz) M         Gynecologic History:  - LMP: No LMP recorded.  - Menses: Cycles q28d, lasting 4-8 days, with moderate flow   - Menstrual symptoms: molimina (breast tenderness), no dysmenorrhea  - Contraception: None currently  - Pap Smears: Last pap 2018 NILM/HPV neg, no history of abnormal paps, no excisional procedures    - Sexual Activity: currently sexually active with male partner  - Sexual Concerns: none  - Hx STIs: Denies. No history of any pelvic infections.   - Hx GYN surgeries: D&C    ROS:  A 10 point review of systems was  "conducted and negative except as noted in HPI    Problem List:  Patient Active Problem List   Diagnosis     Hypothyroidism, unspecified type     Supervision of high-risk pregnancy of elderly multigravida     Cold sore     Hepatitis B, chronic (H)     Hepatitis C, chronic (H)     Threatened      History of miscarriage     Recurrent UTI     Missed period     Current Medications:  Current Outpatient Medications   Medication     levothyroxine (SYNTHROID/LEVOTHROID) 50 MCG tablet     No current facility-administered medications for this visit.     Past Medical History:  Hypothyroidism   Denies personal history of VTE, HTN, asthma, diabetes.     Past Surgical History:  Past Surgical History:   Procedure Laterality Date     DILATION AND CURETTAGE SUCTION N/A 2018    Procedure: DILATION AND CURETTAGE SUCTION;  Suction D & C;  Surgeon: Edmond Garvin MD;  Location:  OR     Social History:  Lives in [place] with her  and her three children   Works: [employer]   EtOH: Denies  Tobacco: Denies   Drugs: Denies  Abuse: Denies current concerns for physical, sexual, mental, emotional, or verbal abuse.     Family History:  Denies family history of preeclampsia, thromboembolic disease, bleeding disorders, mental retardation, stillbirth, unexplained  death, genetic disordres, chromosome abnormalities, or congenital anomalies. No family history of infertility. Denies family history of premature menopause.      Partner History:  Has had 1 childpreviously. Has had a semen analysis which was normal.     Allergies:  No Known Allergies    Exam:  /84 (BP Location: Left arm, Patient Position: Chair)   Pulse 89   Ht 1.499 m (4' 11\")   Wt 68.2 kg (150 lb 6.4 oz)   BMI 30.38 kg/m      General:  Alert, no distress   HEENT:  Normocephalic, without obvious abnormality. No hirsutism.    Pulmonary:  Non-labored breathing on room air   Cardiovascular:  Regular rate   Extremities:  Normal     Labs: " Ordered AMH, Day 3 FSH/E2, Prolactin, anticardiolipin IgG/IgM, anti-beta-2-glycoprotein IgG/IgM, lupus anticoagulant    2022 TSH 5.17, T4 0.83    Imaging: TVUS ordered    Assessment/Plan:  40-43 year old  recurrent pregnancy loss presenting to discuss infertiliyt     # Recurrent Pregnancy Loss    We discussed that miscarriage is the most common complication in early pregnancy and that the majority of miscarriages are sporadic. Chromosomal abnormalities increase with advancing maternal age and account for approximately 60% of all miscarriages. Recurrent pregnancy loss (RPL) refers to two or more consecutive losses of clinically recognized pregnancies prior to 20 weeks of gestation. It is reasonable to begin an initial evaluation for RPL after two consecutive first trimester losses. No apparent causative factor is identified in 50-75% of couples with RPL.     - Will defer karyotype for patient and her partner at this time. We reviewed that given their fertility histories I believe that this is likely to be low yield. However, could be considered to complete RPL work up in the future.   - Consider karyotype/cytogenetic analysis of products of conception with any future losses  - TVUS to evaluate for uterine structural anomalies (uterine septum, unicornuate uterus, bicornuate uterus, didelphic uterus, uterine synechiae, fibroids, etc.)   - Prolactin level to rule out hyperprolactinemia   - No personal history of unprovoked VTE or family history of inherited thrombophilias or unprovoked VTE at <50 y.o. in first degree relatives. Screening for inherited thrombophilia is not indicated.   - Will complete evaluation for antiphospholipid antibody syndrome (estimated to affect 5-20% of patients with RPL) with anticardiolipin antibody (IgG and IgM), lupus anticoagulant, and beta-2 glycoprotein (IgG and IgM). Would plan for repeat evaluation after at least 12 weeks if any of the above titers are positive.   - Will  also obtain Hgb A1C to screen for unrecognized diabetes     # Infertility   - Regular monthly cycles with molimina suggest ovulatory cycles   - Recommend assessment of ovarian reserve with AMH, cycle day 3 (2-5) FSH and estradiol  - Given that patient has conceived multiple times I think tubal factor infertility is unlikely. We discussed deferring HSG at this time pending results of initial evaluation and patient is agreeable.  - Will obtain prolactin, TVUS with antral follicle count  - TSH elevated on review of recent labs on 1/21/22. I would recommend adjusting Synthoid to maintain TSH <2.5 for optimization of fertility.     RTC for TVUS    Discussed with Dr. Pierre Greene MD  Ob/Gyn Resident, PGY-3  2/25/2022 3:32 PM     The Patient was seen in [facility] by JAHAIRA GREENE.  I reviewed the history & exam. Assessment and plan were jointly made.    Gabi Jay MD

## 2022-02-25 ENCOUNTER — OFFICE VISIT (OUTPATIENT)
Dept: OBGYN | Facility: CLINIC | Age: 47
End: 2022-02-25
Attending: NURSE PRACTITIONER
Payer: COMMERCIAL

## 2022-02-25 VITALS
HEIGHT: 59 IN | BODY MASS INDEX: 30.32 KG/M2 | SYSTOLIC BLOOD PRESSURE: 123 MMHG | WEIGHT: 150.4 LBS | DIASTOLIC BLOOD PRESSURE: 84 MMHG | HEART RATE: 89 BPM

## 2022-02-25 DIAGNOSIS — Z31.69 INFERTILITY COUNSELING: ICD-10-CM

## 2022-02-25 DIAGNOSIS — N96 RECURRENT PREGNANCY LOSS: Primary | ICD-10-CM

## 2022-02-25 PROCEDURE — 99214 OFFICE O/P EST MOD 30 MIN: CPT | Mod: GE | Performed by: STUDENT IN AN ORGANIZED HEALTH CARE EDUCATION/TRAINING PROGRAM

## 2022-02-25 PROCEDURE — G0463 HOSPITAL OUTPT CLINIC VISIT: HCPCS

## 2022-02-25 NOTE — LETTER
2022       RE: Bridgett Hardin  [address] [facility] [address]     Dear Colleague,    Thank you for referring your patient, Bridgett Hardin, to the [facility] [place] [facility]. Please see a copy of my visit note below.    [facility]  Infertility Visit   2022    Chief Complaint: Discuss fertility     History of Present Illness:  Bridgett Hardin is a 40-43 year old (age incorrectly assigned on immigration)  who presents for infertility consultation. She was remarried in  and has had two miscarriages the first requiring a D&C. Had to see a fertility specialist and used Clomid to conceive her second child. Spontaneous pregnancies with first and third child.    Patient reports attempting conception with regular, unprotected intercourse. Reports unprotected intercourse approximately 1-2 times weekly. Has been tracking her periods with a calendar and attempting to time intercourse. Reports  had a normal semen analysis. Her partner has 1 child with another partner.    Denies fevers, chills, vision changes, headaches, hot flashes, night sweats, vaginal dryness, mood disturbance, changes in vaginal discharge, galactorrhea/nipple discharge, hirsutism.     Obstetric History:  OB History    Para Term  AB Living   5 3 3 0 2 3   SAB IAB Ectopic Multiple Live Births   2 0 0 0 0      # Outcome Date GA Lbr Ivan/2nd Weight Sex Delivery Anes PTL Lv   5 SAB 19           4 SAB 18 11w1d    AB, MISSED Local        Complications: History of D&C   3 Term 10/21/14   3.005 kg (6 lb 10 oz) F       2 Term 13   3.884 kg (8 lb 9 oz) M   N    1 Term 07   3.459 kg (7 lb 10 oz) M         Gynecologic History:  - LMP: No LMP recorded.  - Menses: Cycles q28d, lasting 4-8 days, with moderate flow   - Menstrual symptoms: molimina (breast tenderness), no dysmenorrhea  - Contraception: None currently  - Pap Smears: Last  pap 2018 NILM/HPV neg, no history of abnormal paps, no excisional procedures    - Sexual Activity: currently sexually active with male partner  - Sexual Concerns: none  - Hx STIs: Denies. No history of any pelvic infections.   - Hx GYN surgeries: D&C    ROS:  A 10 point review of systems was conducted and negative except as noted in HPI    Problem List:  Patient Active Problem List   Diagnosis     Hypothyroidism, unspecified type     Supervision of high-risk pregnancy of elderly multigravida     Cold sore     Hepatitis B, chronic (H)     Hepatitis C, chronic (H)     Threatened      History of miscarriage     Recurrent UTI     Missed period     Current Medications:  Current Outpatient Medications   Medication     levothyroxine (SYNTHROID/LEVOTHROID) 50 MCG tablet     No current facility-administered medications for this visit.     Past Medical History:  Hypothyroidism   Denies personal history of VTE, HTN, asthma, diabetes.     Past Surgical History:  Past Surgical History:   Procedure Laterality Date     DILATION AND CURETTAGE SUCTION N/A 2018    Procedure: DILATION AND CURETTAGE SUCTION;  Suction D & C;  Surgeon: Edmond Garvin MD;  Location:  OR     Social History:  Lives in [place] with her  and her three children   Works:    EtOH: Denies  Tobacco: Denies   Drugs: Denies  Abuse: Denies current concerns for physical, sexual, mental, emotional, or verbal abuse.     Family History:  Denies family history of preeclampsia, thromboembolic disease, bleeding disorders, mental retardation, stillbirth, unexplained  death, genetic disordres, chromosome abnormalities, or congenital anomalies. No family history of infertility. Denies family history of premature menopause.      Partner History:  Has had 1 childpreviously. Has had a semen analysis which was normal.     Allergies:  No Known Allergies    Exam:  /84 (BP Location: Left arm, Patient Position: Chair)    "Pulse 89   Ht 1.499 m (4' 11\")   Wt 68.2 kg (150 lb 6.4 oz)   BMI 30.38 kg/m      General:  Alert, no distress   HEENT:  Normocephalic, without obvious abnormality. No hirsutism.    Pulmonary:  Non-labored breathing on room air   Cardiovascular:  Regular rate   Extremities:  Normal     Labs: Ordered AMH, Day 3 FSH/E2, Prolactin, anticardiolipin IgG/IgM, anti-beta-2-glycoprotein IgG/IgM, lupus anticoagulant    2022 TSH 5.17, T4 0.83    Imaging: TVUS ordered    Assessment/Plan:  40-43 year old  recurrent pregnancy loss presenting to discuss infertiliyt     # Recurrent Pregnancy Loss    We discussed that miscarriage is the most common complication in early pregnancy and that the majority of miscarriages are sporadic. Chromosomal abnormalities increase with advancing maternal age and account for approximately 60% of all miscarriages. Recurrent pregnancy loss (RPL) refers to two or more consecutive losses of clinically recognized pregnancies prior to 20 weeks of gestation. It is reasonable to begin an initial evaluation for RPL after two consecutive first trimester losses. No apparent causative factor is identified in 50-75% of couples with RPL.     - Will defer karyotype for patient and her partner at this time. We reviewed that given their fertility histories I believe that this is likely to be low yield. However, could be considered to complete RPL work up in the future.   - Consider karyotype/cytogenetic analysis of products of conception with any future losses  - TVUS to evaluate for uterine structural anomalies (uterine septum, unicornuate uterus, bicornuate uterus, didelphic uterus, uterine synechiae, fibroids, etc.)   - Prolactin level to rule out hyperprolactinemia   - No personal history of unprovoked VTE or family history of inherited thrombophilias or unprovoked VTE at <50 y.o. in first degree relatives. Screening for inherited thrombophilia is not indicated.   - Will complete evaluation for " antiphospholipid antibody syndrome (estimated to affect 5-20% of patients with RPL) with anticardiolipin antibody (IgG and IgM), lupus anticoagulant, and beta-2 glycoprotein (IgG and IgM). Would plan for repeat evaluation after at least 12 weeks if any of the above titers are positive.   - Will also obtain Hgb A1C to screen for unrecognized diabetes     # Infertility   - Regular monthly cycles with molimina suggest ovulatory cycles   - Recommend assessment of ovarian reserve with AMH, cycle day 3 (2-5) FSH and estradiol  - Given that patient has conceived multiple times I think tubal factor infertility is unlikely. We discussed deferring HSG at this time pending results of initial evaluation and patient is agreeable.  - Will obtain prolactin, TVUS with antral follicle count  - TSH elevated on review of recent labs on 1/21/22. I would recommend adjusting Synthoid to maintain TSH <2.5 for optimization of fertility.     RTC for TVUS    Discussed with Dr. Pierre Greene MD  Ob/Gyn Resident, PGY-3  2/25/2022 3:32 PM     The Patient was seen in [facility] by JAHAIRA GREENE.  I reviewed the history & exam. Assessment and plan were jointly made.    Gabi Jay MD

## 2022-03-01 ENCOUNTER — ANCILLARY PROCEDURE (OUTPATIENT)
Dept: MAMMOGRAPHY | Facility: CLINIC | Age: 47
End: 2022-03-01
Payer: COMMERCIAL

## 2022-03-01 DIAGNOSIS — Z12.31 ENCOUNTER FOR SCREENING MAMMOGRAM FOR BREAST CANCER: ICD-10-CM

## 2022-03-01 PROCEDURE — 77067 SCR MAMMO BI INCL CAD: CPT | Mod: TC | Performed by: RADIOLOGY

## 2022-03-03 ENCOUNTER — MYC MEDICAL ADVICE (OUTPATIENT)
Dept: FAMILY MEDICINE | Facility: CLINIC | Age: 47
End: 2022-03-03
Payer: COMMERCIAL

## 2022-03-03 DIAGNOSIS — E03.9 HYPOTHYROIDISM, UNSPECIFIED TYPE: ICD-10-CM

## 2022-03-04 RX ORDER — LEVOTHYROXINE SODIUM 75 UG/1
75 TABLET ORAL DAILY
Qty: 90 TABLET | Refills: 0 | Status: SHIPPED | OUTPATIENT
Start: 2022-03-04 | End: 2022-06-13

## 2022-03-08 ENCOUNTER — LAB (OUTPATIENT)
Dept: LAB | Facility: CLINIC | Age: 47
End: 2022-03-08
Payer: COMMERCIAL

## 2022-03-08 DIAGNOSIS — Z31.69 INFERTILITY COUNSELING: ICD-10-CM

## 2022-03-08 DIAGNOSIS — N96 RECURRENT PREGNANCY LOSS: ICD-10-CM

## 2022-03-08 LAB
ESTRADIOL SERPL-MCNC: 32 PG/ML
FSH SERPL-ACNC: 13.4 IU/L
HBA1C MFR BLD: 6.3 % (ref 0–5.6)
PROLACTIN SERPL-MCNC: 16 UG/L (ref 3–27)

## 2022-03-08 PROCEDURE — 82670 ASSAY OF TOTAL ESTRADIOL: CPT

## 2022-03-08 PROCEDURE — 85613 RUSSELL VIPER VENOM DILUTED: CPT

## 2022-03-08 PROCEDURE — 86146 BETA-2 GLYCOPROTEIN ANTIBODY: CPT | Mod: 59

## 2022-03-08 PROCEDURE — 86146 BETA-2 GLYCOPROTEIN ANTIBODY: CPT

## 2022-03-08 PROCEDURE — 85730 THROMBOPLASTIN TIME PARTIAL: CPT

## 2022-03-08 PROCEDURE — 83520 IMMUNOASSAY QUANT NOS NONAB: CPT | Mod: 90

## 2022-03-08 PROCEDURE — 84146 ASSAY OF PROLACTIN: CPT

## 2022-03-08 PROCEDURE — 83001 ASSAY OF GONADOTROPIN (FSH): CPT

## 2022-03-08 PROCEDURE — 85390 FIBRINOLYSINS SCREEN I&R: CPT | Performed by: PATHOLOGY

## 2022-03-08 PROCEDURE — 83036 HEMOGLOBIN GLYCOSYLATED A1C: CPT

## 2022-03-08 PROCEDURE — 86147 CARDIOLIPIN ANTIBODY EA IG: CPT

## 2022-03-08 PROCEDURE — 36415 COLL VENOUS BLD VENIPUNCTURE: CPT

## 2022-03-08 PROCEDURE — 99000 SPECIMEN HANDLING OFFICE-LAB: CPT

## 2022-03-09 LAB
B2 GLYCOPROT1 IGG SERPL IA-ACNC: 1.4 U/ML
B2 GLYCOPROT1 IGM SERPL IA-ACNC: <2.4 U/ML
CARDIOLIPIN IGG SER IA-ACNC: 3 GPL-U/ML
CARDIOLIPIN IGG SER IA-ACNC: NEGATIVE
CARDIOLIPIN IGM SER IA-ACNC: 7.3 MPL-U/ML
CARDIOLIPIN IGM SER IA-ACNC: NEGATIVE
DRVVT SCREEN RATIO: 0.93
INR PPP: 1.04 (ref 0.85–1.15)
LA PPP-IMP: NEGATIVE
LUPUS INTERPRETATION: NORMAL
PTT RATIO: 0.92
THROMBIN TIME: 16.8 SECONDS (ref 13–19)

## 2022-03-11 ENCOUNTER — ANCILLARY PROCEDURE (OUTPATIENT)
Dept: ULTRASOUND IMAGING | Facility: CLINIC | Age: 47
End: 2022-03-11
Payer: COMMERCIAL

## 2022-03-11 DIAGNOSIS — N96 RECURRENT PREGNANCY LOSS: ICD-10-CM

## 2022-03-11 DIAGNOSIS — Z31.69 INFERTILITY COUNSELING: ICD-10-CM

## 2022-03-11 PROCEDURE — 76830 TRANSVAGINAL US NON-OB: CPT

## 2022-03-11 PROCEDURE — 76830 TRANSVAGINAL US NON-OB: CPT | Mod: 26 | Performed by: OBSTETRICS & GYNECOLOGY

## 2022-03-14 ENCOUNTER — TELEPHONE (OUTPATIENT)
Dept: OBGYN | Facility: CLINIC | Age: 47
End: 2022-03-14
Payer: COMMERCIAL

## 2022-03-14 LAB — MIS SERPL-MCNC: 0.4 NG/ML

## 2022-03-15 ENCOUNTER — TELEPHONE (OUTPATIENT)
Dept: OBGYN | Facility: CLINIC | Age: 47
End: 2022-03-15
Payer: COMMERCIAL

## 2022-03-15 NOTE — TELEPHONE ENCOUNTER
[facility]    Phone Message    May a detailed message be left on voicemail: yes     Reason for Call: Requesting Results   Name/type of test: U/S Transvaginal Non OB  Date of test: 03/11/22  Was test done at a location other than [facility] (Please fill in the location if not [facility])?: No    Pt called and would like to schedule an appointment w/ Dr. Freeman to go over results - writer could not find anything in time frame pt requested - please call Pt to further discuss. Thanks!      Action Taken: Message routed to:  Other: [facility]    Travel Screening: Not Applicable

## 2022-03-18 ENCOUNTER — OFFICE VISIT (OUTPATIENT)
Dept: FAMILY MEDICINE | Facility: CLINIC | Age: 47
End: 2022-03-18
Payer: COMMERCIAL

## 2022-03-18 VITALS
DIASTOLIC BLOOD PRESSURE: 87 MMHG | HEART RATE: 72 BPM | SYSTOLIC BLOOD PRESSURE: 129 MMHG | TEMPERATURE: 97.6 F | WEIGHT: 150 LBS | RESPIRATION RATE: 16 BRPM | BODY MASS INDEX: 30.3 KG/M2 | OXYGEN SATURATION: 97 %

## 2022-03-18 DIAGNOSIS — E03.9 HYPOTHYROIDISM, UNSPECIFIED TYPE: ICD-10-CM

## 2022-03-18 DIAGNOSIS — R10.13 ABDOMINAL PAIN, EPIGASTRIC: Primary | ICD-10-CM

## 2022-03-18 DIAGNOSIS — A04.8 HELICOBACTER PYLORI INFECTION: ICD-10-CM

## 2022-03-18 LAB
ALBUMIN SERPL-MCNC: 3.4 G/DL (ref 3.4–5)
ALP SERPL-CCNC: 94 U/L (ref 40–150)
ALT SERPL W P-5'-P-CCNC: 44 U/L (ref 0–50)
AMYLASE SERPL-CCNC: 61 U/L (ref 30–110)
ANION GAP SERPL CALCULATED.3IONS-SCNC: 7 MMOL/L (ref 3–14)
AST SERPL W P-5'-P-CCNC: 22 U/L (ref 0–45)
BASOPHILS # BLD AUTO: 0 10E3/UL (ref 0–0.2)
BASOPHILS NFR BLD AUTO: 0 %
BILIRUB SERPL-MCNC: 0.4 MG/DL (ref 0.2–1.3)
BUN SERPL-MCNC: 13 MG/DL (ref 7–30)
CALCIUM SERPL-MCNC: 8.9 MG/DL (ref 8.5–10.1)
CHLORIDE BLD-SCNC: 107 MMOL/L (ref 94–109)
CO2 SERPL-SCNC: 25 MMOL/L (ref 20–32)
CREAT SERPL-MCNC: 0.65 MG/DL (ref 0.52–1.04)
EOSINOPHIL # BLD AUTO: 0.1 10E3/UL (ref 0–0.7)
EOSINOPHIL NFR BLD AUTO: 2 %
ERYTHROCYTE [DISTWIDTH] IN BLOOD BY AUTOMATED COUNT: 13 % (ref 10–15)
GFR SERPL CREATININE-BSD FRML MDRD: >90 ML/MIN/1.73M2
GLUCOSE BLD-MCNC: 121 MG/DL (ref 70–99)
HCT VFR BLD AUTO: 42.4 % (ref 35–47)
HGB BLD-MCNC: 13.8 G/DL (ref 11.7–15.7)
LIPASE SERPL-CCNC: 153 U/L (ref 73–393)
LYMPHOCYTES # BLD AUTO: 1.6 10E3/UL (ref 0.8–5.3)
LYMPHOCYTES NFR BLD AUTO: 19 %
MCH RBC QN AUTO: 29.1 PG (ref 26.5–33)
MCHC RBC AUTO-ENTMCNC: 32.5 G/DL (ref 31.5–36.5)
MCV RBC AUTO: 89 FL (ref 78–100)
MONOCYTES # BLD AUTO: 0.5 10E3/UL (ref 0–1.3)
MONOCYTES NFR BLD AUTO: 6 %
NEUTROPHILS # BLD AUTO: 6.3 10E3/UL (ref 1.6–8.3)
NEUTROPHILS NFR BLD AUTO: 74 %
PLATELET # BLD AUTO: 230 10E3/UL (ref 150–450)
POTASSIUM BLD-SCNC: 4.1 MMOL/L (ref 3.4–5.3)
PROT SERPL-MCNC: 7.7 G/DL (ref 6.8–8.8)
RBC # BLD AUTO: 4.75 10E6/UL (ref 3.8–5.2)
SODIUM SERPL-SCNC: 139 MMOL/L (ref 133–144)
TSH SERPL DL<=0.005 MIU/L-ACNC: 1.63 MU/L (ref 0.4–4)
WBC # BLD AUTO: 8.5 10E3/UL (ref 4–11)

## 2022-03-18 PROCEDURE — 83690 ASSAY OF LIPASE: CPT | Performed by: PHYSICIAN ASSISTANT

## 2022-03-18 PROCEDURE — 82150 ASSAY OF AMYLASE: CPT | Performed by: PHYSICIAN ASSISTANT

## 2022-03-18 PROCEDURE — 80050 GENERAL HEALTH PANEL: CPT | Performed by: PHYSICIAN ASSISTANT

## 2022-03-18 PROCEDURE — 99214 OFFICE O/P EST MOD 30 MIN: CPT | Performed by: PHYSICIAN ASSISTANT

## 2022-03-18 PROCEDURE — 36415 COLL VENOUS BLD VENIPUNCTURE: CPT | Performed by: PHYSICIAN ASSISTANT

## 2022-03-18 ASSESSMENT — PAIN SCALES - GENERAL: PAINLEVEL: MODERATE PAIN (5)

## 2022-03-18 NOTE — RESULT ENCOUNTER NOTE
MsOlena Hardin,    All of your labs were normal/near normal for you.    Please contact the clinic if you have additional questions.  Thank you.    Sincerely,    Angel Hernandez PA-C

## 2022-03-18 NOTE — NURSING NOTE
"Chief Complaint   Patient presents with     Abdominal Pain     x 2 months, all the time, worse when hungry or after eat, nausea       Initial /87   Pulse 72   Temp 97.6  F (36.4  C) (Tympanic)   Resp 16   Wt 68 kg (150 lb)   SpO2 97%   BMI 30.30 kg/m   Estimated body mass index is 30.3 kg/m  as calculated from the following:    Height as of 2/25/22: 1.499 m (4' 11\").    Weight as of this encounter: 68 kg (150 lb).  Medication Reconciliation: complete  Alanna Andrade, ARI  "

## 2022-03-18 NOTE — PATIENT INSTRUCTIONS
Patient Education     GERD (Adult)    The esophagus is a tube that carries food from the mouth to the stomach. A valve (the LES, lower esophageal sphincter) at the lower end of the esophagus prevents stomach acid from flowing upward. When this valve doesn't work properly, stomach contents may repeatedly flow back up (reflux) into the esophagus. This is called gastroesophageal reflux disease (GERD). GERD can irritate the esophagus. It can cause problems with pain, swallowing or breathing. In severe cases, GERD can cause recurrent pneumonia (from aspiration or breathing in particles) or other serious problems.  Symptoms of reflux include burning, pressure or sharp pain in the upper abdomen or mid to lower chest. The pain can spread to the neck, back, or shoulder. There may be belching, an acid taste in the back of the throat, chronic cough, or sore throat, or hoarseness. GERD symptoms often occur during the day after a big meal. They can also occur at night when lying down.   Home care  Lifestyle changes can help reduce symptoms. If needed, your healthcare provider may prescribe medicines. Symptoms often improve with treatment, but if treatment is stopped, the symptoms often return after a few months. So most persons with GERD will need to continue treatment or get treatment on and off.  Lifestyle changes    Limit or avoid fatty, fried, and spicy foods, as well as coffee, chocolate, mint, and foods with high acid content such as tomatoes and citrus fruit and juices (orange, grapefruit, lemon).    Don t eat large meals, especially at night. Frequent, smaller meals are best. Don't lie down right after eating. And don t eat anything 3 hours before going to bed.    Don't drink alcohol or smoke. As much as possible, stay away from second hand smoke.    If you are overweight, losing weight will reduce symptoms.     Don't wear tight clothing around your stomach area.    If your symptoms occur during sleep, use a foam wedge  "to elevate your upper body (not just your head.) Or, place 4\" blocks under the head of your bed. Or use 2 bed risers under your bedframe.  Medicines  If needed, medicines can help relieve the symptoms of GERD and prevent damage to the esophagus. Discuss a medicine plan with your healthcare provider. This may include one or more of the following medicines:    Antacids to help neutralize the normal acids in your stomach.    Acid blockers (Histamine or H2 blockers) to decrease acid production.    Acid inhibitors (proton pump inhibitors PPIs) to decrease acid production in a different way than the blockers. They may work better, but can take a little longer to take effect.  Take an antacid 30 to 60 minutes after eating and at bedtime, but not at the same time as an acid blocker.  Try not to take medicines such as ibuprofen and aspirin. If you are taking aspirin for your heart or other medical reasons, talk to your healthcare provider about stopping it.  Follow-up care  Follow up with your healthcare provider or as advised by our staff.  When to seek medical advice  Call your healthcare provider if any of the following occur:    Stomach pain gets worse or moves to the lower right abdomen (appendix area)    Chest pain appears or gets worse, or spreads to the back, neck, shoulder, or arm    An over-the-counter trial of medicine doesn't relieve your symptoms    Weight loss that can't be explained    Trouble or pain swallowing    Frequent vomiting (can t keep down liquids)    Blood in the stool or vomit (red or black in color)    Feeling weak or dizzy    Fever of 100.4 F (38 C) or higher, or as directed by your healthcare provider  Stephanie last reviewed this educational content on 3/1/2018    1868-7947 [employer]. All rights reserved. This information is not intended as a substitute for professional medical care. Always follow your healthcare professional's instructions.           "

## 2022-03-18 NOTE — TELEPHONE ENCOUNTER
[facility] Center    Phone Message    May a detailed message be left on voicemail: yes     Reason for Call: Other: Pt called to schedule appt with Dr Freeman to review results of US on 3/11.  First available appt writer can schedule for is in May.   Pt asking if a nurse may be able to call her back or can she schedule with any available doctor to review results, either telephone visit or in person?    Please give pt a call to discuss.     Action Taken: Message routed to:  [facility]    Travel Screening: Not Applicable

## 2022-03-20 PROCEDURE — 87338 HPYLORI STOOL AG IA: CPT | Performed by: PHYSICIAN ASSISTANT

## 2022-03-22 LAB — H PYLORI AG STL QL IA: POSITIVE

## 2022-03-22 RX ORDER — AMOXICILLIN 500 MG/1
1000 CAPSULE ORAL 2 TIMES DAILY
Qty: 56 CAPSULE | Refills: 0 | Status: SHIPPED | OUTPATIENT
Start: 2022-03-22 | End: 2022-04-05

## 2022-03-22 RX ORDER — CLARITHROMYCIN 500 MG
500 TABLET ORAL 2 TIMES DAILY
Qty: 28 TABLET | Refills: 0 | Status: SHIPPED | OUTPATIENT
Start: 2022-03-22 | End: 2022-04-05

## 2022-03-22 NOTE — RESULT ENCOUNTER NOTE
Please call patient and let her know her H. pylori test was positive and a prescription for antibiotics were sent to the pharmacy.  She is to take this with her omeprazole daily for 2 weeks and follow-up as needed.

## 2022-03-22 NOTE — TELEPHONE ENCOUNTER
The patient and her  are calling again regarding scheduling sooner appointment with Dr. Freeman. Writer could not find anything before 5/12/22. They are wondering if they can schedule with someone else? Or is someone able to call to go over results. Please call patient ASAP. Thank you.

## 2022-03-25 ENCOUNTER — OFFICE VISIT (OUTPATIENT)
Dept: OBGYN | Facility: CLINIC | Age: 47
End: 2022-03-25
Payer: COMMERCIAL

## 2022-03-25 VITALS
HEIGHT: 59 IN | BODY MASS INDEX: 30.64 KG/M2 | DIASTOLIC BLOOD PRESSURE: 84 MMHG | HEART RATE: 82 BPM | WEIGHT: 152 LBS | SYSTOLIC BLOOD PRESSURE: 131 MMHG

## 2022-03-25 DIAGNOSIS — Z87.59 HISTORY OF MISCARRIAGE: ICD-10-CM

## 2022-03-25 DIAGNOSIS — Z31.41 ENCOUNTER FOR FERTILITY TESTING: Primary | ICD-10-CM

## 2022-03-25 PROCEDURE — G0463 HOSPITAL OUTPT CLINIC VISIT: HCPCS

## 2022-03-25 PROCEDURE — 99213 OFFICE O/P EST LOW 20 MIN: CPT | Mod: GE | Performed by: OBSTETRICS & GYNECOLOGY

## 2022-03-25 NOTE — PROGRESS NOTES
"[facility]  Follow-Up Visit    S: Ms. Bridgett Hardin is a 46 year old  here for follow-up of her laboratory and US results. Patient was previously seen by Dr. Freeman on  for fertility workup. She is still interested in pursuing fertility, as she is with a new partner and they would like a child together.     O:  /84   Pulse 82   Ht 1.499 m (4' 11\")   Wt 68.9 kg (152 lb)   LMP 2022   BMI 30.70 kg/m    Gen: Well-appearing, NAD  HEENT: Normocephalic, atraumatic  CV:  Well perfused  Pulm: Nonlabored respirations on room air     Labs:   FSH 13.4  Estradiol 32  AMH 0.404  TSH (3/18) 1.63  Prolactin 16  HgbA1c 6.3  Lupus anticoagulant panel, Beta 2 glycoprotein IgG and IgM all normal       Gynecological Ultrasonography:   Uterus: anteverted. Somewhat bulky, heterogeneous.  Size: 8.9 x 7.0 x 5.0 cm  Endometrium: Thickness Total 4.4 mm     Right Ovary: 1.7 x 2.9 x 1.9 cm. Wnl  Left Ovary: 2.4 x 1.7 x 1.3 cm. Wnl  Cul de Sac Free Fluid: No free fluid     Impression: Possible adenomyosis, otherwise normal pelvic ultrasound.    A/P:  Ms. Bridgett Hardin is a 46 year old  here for discussion of labs and US. I explained that her AMH and FSH indicate low ovarian reserve. Discussed that as women age, their egg quantity and quality decrease. Highly encouraged her to seek care from [facility] as soon as able if she would like to pursue fertility treatment. List of [facility] clinics in the area given to patient. If patient wishes to pursue ovulation induction, will still need HSG however if she decides to pursue IVF this will not be necessary. Patient plans to contact [facility] for consultation.     Also discussed recent H. Pylori diagnosis and encouraged her to  antibiotics from her pharmacy. Hgb A1c was also reviewed as patient in pre-diabetic range. She will plan to follow-up these issues with her PCP.     Patient discussed with Dr. Lee.     Lore Pacheco MD  Obstetrics and Gynecology, " PGY-4  3/25/2022 1:38 PM    The Patient was seen in [facility] by MUSHTAQ LUONG.  I reviewed the history & exam. Assessment and plan were jointly made.    America Lee MD

## 2022-03-29 ENCOUNTER — TELEPHONE (OUTPATIENT)
Dept: OBGYN | Facility: CLINIC | Age: 47
End: 2022-03-29
Payer: COMMERCIAL

## 2022-03-29 DIAGNOSIS — Z31.41 ENCOUNTER FOR FERTILITY TESTING: Primary | ICD-10-CM

## 2022-03-29 NOTE — TELEPHONE ENCOUNTER
[facility]    Phone Message    May a detailed message be left on voicemail: yes     Reason for Call: Other: Pt called to obtain a referral to [facility] in [place] and asked to have med recs sent to them as well.   Please give pt a call to confirm this has been sent.      Action Taken: Message routed to:  [facility]    Travel Screening: Not Applicable

## 2022-03-30 NOTE — TELEPHONE ENCOUNTER
Referral discussed and recommended in previous office visit. Referral generated per protocol and faxed along with records to [facility]. Called and updated patient.

## 2022-04-26 DIAGNOSIS — R10.13 ABDOMINAL PAIN, EPIGASTRIC: ICD-10-CM

## 2022-04-27 NOTE — TELEPHONE ENCOUNTER
Routing refill request to provider for review/approval because:  Previous prescription .  Magy Hutson BSN, RN

## 2022-06-12 DIAGNOSIS — E03.9 HYPOTHYROIDISM, UNSPECIFIED TYPE: ICD-10-CM

## 2022-06-13 ENCOUNTER — MYC REFILL (OUTPATIENT)
Dept: FAMILY MEDICINE | Facility: CLINIC | Age: 47
End: 2022-06-13
Payer: COMMERCIAL

## 2022-06-13 DIAGNOSIS — E03.9 HYPOTHYROIDISM, UNSPECIFIED TYPE: ICD-10-CM

## 2022-06-13 RX ORDER — LEVOTHYROXINE SODIUM 75 UG/1
75 TABLET ORAL DAILY
Qty: 90 TABLET | Refills: 0 | Status: CANCELLED | OUTPATIENT
Start: 2022-06-13

## 2022-06-13 RX ORDER — LEVOTHYROXINE SODIUM 75 UG/1
TABLET ORAL
Qty: 90 TABLET | Refills: 0 | Status: SHIPPED | OUTPATIENT
Start: 2022-06-13 | End: 2022-09-28

## 2022-09-17 ENCOUNTER — HEALTH MAINTENANCE LETTER (OUTPATIENT)
Age: 47
End: 2022-09-17

## 2023-01-27 DIAGNOSIS — E03.9 HYPOTHYROIDISM, UNSPECIFIED TYPE: ICD-10-CM

## 2023-01-30 RX ORDER — LEVOTHYROXINE SODIUM 75 UG/1
TABLET ORAL
Qty: 90 TABLET | Refills: 0 | Status: SHIPPED | OUTPATIENT
Start: 2023-01-30 | End: 2023-03-17

## 2023-01-30 NOTE — TELEPHONE ENCOUNTER
According to the pharmacy, patient filled his Levothyroxine (SYNTHROID/LEVOTHROID) 75 MCG tablets last on 9/29/22.     He must have called in an old bottle of the 50 mcg dose, however patient has not more refills on file for either dose.     TSH   Date Value Ref Range Status   03/18/2022 1.63 0.40 - 4.00 mU/L Final   12/17/2020 2.02 0.40 - 4.00 mU/L Final     Patient due for physical and last in the next few months. My Chart message sent to patient.    Ada Mcgee RN BSN  [facility]

## 2023-01-31 ENCOUNTER — ANCILLARY PROCEDURE (OUTPATIENT)
Dept: GENERAL RADIOLOGY | Facility: CLINIC | Age: 48
End: 2023-01-31
Attending: PHYSICIAN ASSISTANT
Payer: COMMERCIAL

## 2023-01-31 ENCOUNTER — OFFICE VISIT (OUTPATIENT)
Dept: URGENT CARE | Facility: URGENT CARE | Age: 48
End: 2023-01-31
Payer: COMMERCIAL

## 2023-01-31 VITALS
WEIGHT: 152.4 LBS | TEMPERATURE: 96.8 F | SYSTOLIC BLOOD PRESSURE: 129 MMHG | DIASTOLIC BLOOD PRESSURE: 92 MMHG | BODY MASS INDEX: 30.78 KG/M2 | HEART RATE: 75 BPM | OXYGEN SATURATION: 98 %

## 2023-01-31 DIAGNOSIS — M54.6 ACUTE MIDLINE THORACIC BACK PAIN: Primary | ICD-10-CM

## 2023-01-31 DIAGNOSIS — M54.6 ACUTE MIDLINE THORACIC BACK PAIN: ICD-10-CM

## 2023-01-31 DIAGNOSIS — R10.13 EPIGASTRIC PAIN: ICD-10-CM

## 2023-01-31 PROCEDURE — 72070 X-RAY EXAM THORAC SPINE 2VWS: CPT | Mod: TC | Performed by: RADIOLOGY

## 2023-01-31 PROCEDURE — 99214 OFFICE O/P EST MOD 30 MIN: CPT | Performed by: PHYSICIAN ASSISTANT

## 2023-01-31 RX ORDER — ACETAMINOPHEN 500 MG
500-1000 TABLET ORAL EVERY 8 HOURS PRN
Qty: 30 TABLET | Refills: 0 | Status: SHIPPED | OUTPATIENT
Start: 2023-01-31 | End: 2023-02-10

## 2023-01-31 RX ORDER — CYCLOBENZAPRINE HCL 10 MG
10 TABLET ORAL
Qty: 20 TABLET | Refills: 0 | Status: SHIPPED | OUTPATIENT
Start: 2023-01-31 | End: 2023-02-20

## 2023-01-31 NOTE — PROGRESS NOTES
Chief Complaint   Patient presents with     Urgent Care     Back Pain     Upper back for awhile and sometimes it radiate to left side shoulder blade and causing chills, dizzy, and headache     Abdominal Pain     feel like stomach ulcer, if eat anything hard or spicy would get discomfort in stomach     xray-I see?  Narrowing of the T4 vertebrae    Results for orders placed or performed in visit on 01/31/23   XR Thoracic Spine 2 Views     Status: None    Narrative    EXAM: XR THORACIC SPINE 2 VIEWS  LOCATION: [facility]  DATE/TIME: 1/31/2023 4:46 PM    INDICATION:  Acute midline thoracic back pain  COMPARISON: None.      Impression    IMPRESSION: Mild upper right thoracic curve. No definite fracture. Normal extraspinal structures.            Impression:     ICD-10-CM    1. Acute midline thoracic back pain  M54.6 XR Thoracic Spine 2 Views     acetaminophen (TYLENOL) 500 MG tablet     cyclobenzaprine (FLEXERIL) 10 MG tablet     Orthopedic  Referral      2. Epigastric pain  R10.13 Adult GI  Referral - Procedure Only     omeprazole (PRILOSEC) 20 MG DR capsule     acetaminophen (TYLENOL) 500 MG tablet     cyclobenzaprine (FLEXERIL) 10 MG tablet            Plan: Thoracic back pain, very localized on the spine.  Tylenol and muscle relaxant at bedtime.  Follow-up with Ortho.  Epigastric pain.  Patient worried about ulcer.  Referral for endoscopy.  Prilosec started.    Yamileth Luna PA-C      SUBJECTIVE:  47-year-old female comes in for 2 concerns:  #1 thoracic back pain for at least 2 months.  Has gone through physical therapy for this in the past.  #2 epigastric pain that comes and goes.  Had work-up for this in April and was positive for H. pylori and treated accordingly.  She states to review care she had a recent H. pylori test that was negative.  She wonders if the 2 pains are related or not.        No Known Allergies    No past medical history on  file.    levothyroxine (SYNTHROID/LEVOTHROID) 75 MCG tablet, TAKE 1 TABLET(75 MCG) BY MOUTH DAILY  omeprazole (PRILOSEC) 20 MG DR capsule, TAKE 1 CAPSULE(20 MG) BY MOUTH TWICE DAILY    No current facility-administered medications on file prior to visit.      Past Surgical History:   Procedure Laterality Date     DILATION AND CURETTAGE SUCTION N/A 5/11/2018    Procedure: DILATION AND CURETTAGE SUCTION;  Suction D & C;  Surgeon: Edmond Garvin MD;  Location:  OR       Social History     Socioeconomic History     Marital status:      Spouse name: Not on file     Number of children: Not on file     Years of education: Not on file     Highest education level: Not on file   Occupational History     Not on file   Tobacco Use     Smoking status: Never     Smokeless tobacco: Never   Vaping Use     Vaping Use: Never used   Substance and Sexual Activity     Alcohol use: No     Drug use: No     Sexual activity: Yes     Partners: Male   Other Topics Concern     Parent/sibling w/ CABG, MI or angioplasty before 65F 55M? Not Asked   Social History Narrative     Not on file     Social Determinants of Health     Financial Resource Strain: Not on file   Food Insecurity: Not on file   Transportation Needs: Not on file   Physical Activity: Not on file   Stress: Not on file   Social Connections: Not on file   Intimate Partner Violence: Not on file   Housing Stability: Not on file       REVIEW OF SYSTEMS  General: negative for fever  Resp: negative for chest pain   CV: negative for chest pain  : negative for dysuria , incontinence, frequency  Musculoskeletal: as above  Neurologic: negative for ataxia, saddle anesthesia, fecal incontinence, one sided weakness,  paresthesias    Physical Exam:  Vitals: BP (!) 129/92 (BP Location: Left arm, Patient Position: Sitting, Cuff Size: Adult Regular)   Pulse 75   Temp 96.8  F (36  C) (Tympanic)   Wt 69.1 kg (152 lb 6.4 oz)   SpO2 98%   BMI 30.78 kg/m    BMI= Body mass index is  30.78 kg/m .  Constitutional: healthy, alert and no acute distress   CARDIO: RRR, no MRG  RESP: lungs CTA, NAD  NEURO: Patellar  and ankle reflexes intact and equal bilaterally  BACK: Localized tenderness upper midline thoracic spine, approximately T5 level.   GAIT: intact  Psychiatric: mentation appears normal and affect normal/bright  Abdomen-mild epigastric tenderness.  Normal active bowel sounds.  No hepatosplenomegaly.  No rigidity, guarding, rebound.      Yamileth Luna PA-C

## 2023-02-10 ENCOUNTER — TELEPHONE (OUTPATIENT)
Dept: GASTROENTEROLOGY | Facility: CLINIC | Age: 48
End: 2023-02-10
Payer: COMMERCIAL

## 2023-02-10 NOTE — TELEPHONE ENCOUNTER
Pre assessment questions completed for upcoming Upper endoscopy (EGD) procedure scheduled on 2/17/23    COVID policy reviewed.     Reviewed procedural arrival time 0650, procedure time 0735 and facility location [facility]; [address], [place] [address]    Designated  policy reviewed. Instructed to have someone stay 24 hours post procedure.     Anticoagulation/blood thinners? No    Electronic implanted devices? No    Reviewed procedure prep instructions.     Patient verbalized understanding and had no questions or concerns at this time.    Leonila Jones, RN  Endoscopy Procedure Pre Assessment RN

## 2023-02-10 NOTE — TELEPHONE ENCOUNTER
Patient scheduled for Upper endoscopy (EGD) on 2/17/23.     Discuss Covid policy.     Arrival time: 0650. Procedure time 0735    Facility location: [facility]; [address] [address] [place] [address], [address], [place] [address]    Sedation type: MAC    Anticoagulations? No    Electronic implanted devices? No    Diabetic? No    Indication for procedure: epigastric pain    Prep instructions sent via [employer]     Pre visit planning completed.    Leonila Jones RN  Endoscopy Procedure Pre Assessment RN

## 2023-02-10 NOTE — TELEPHONE ENCOUNTER
Screening Questions  BLUE  KIND OF PREP RED  LOCATION [review exclusion criteria] GREEN  SEDATION TYPE    Y  Are you active on mychart?   Yamileth Luna PA-C  Ordering/Referring Provider?    [employer]  What type of coverage do you have?  N Have you had a positive covid test in the last 14 days?    30.7  1. BMI  [BMI 40+ - review exclusion criteria - MAC + [facility]]            *NEED PAC APPT AT [facility] + MAC (ONLY)*     SELF   2. Are you able to give consent for your medical care? [IF NO,RN REVIEW]          N  3. Are you taking any prescription pain medications on a routine schedule   (ex narcotics: tramadol, oxycodone, roxicodone, oxycontin,  and percocet)?    [RN Review]             N  3a. EXTENDED PREP What kind of prescription?     N 4. Do you have any chemical dependencies such as alcohol, street drugs, or methadone?    **If yes 3- 5 , please schedule with MAC sedation.**          IF YES TO ANY 6 - 10 - HOSPITAL SETTING ONLY.     N 6.   Do you need assistance transferring?     N 7.   Have you had a heart or lung transplant?    N 8.   Are you currently on dialysis?   N 9.   Do you use daily home oxygen?   N 10. Do you take nitroglycerin?   10a. N If yes, how often?     11. [FEMALES]   Are you currently pregnant?     11a.  If yes, how many weeks? [ Greater than 12 weeks, OR NEEDED]    N 12. [review exclusion criteria]  Do you have any implantable devices in your body (pacemaker, defib, LVAD)?            *NEED PAC APPT AT [facility]*     N 13. Do you have Pulmonary Hypertension?             *NEED PAC APPT AT [facility]*     N 14. In the past 6 months, have you had any heart related issues including cardiomyopathy or heart attack?     N 14a. If yes, did it require cardiac stenting if so when?     N 15. Have you had a stroke or Transient ischemic attack (TIA - aka  mini stroke ) within 6 months?      N 16. Do you have mod to severe Obstructive Sleep Apnea?  [Hospital only]    N 17. Do you have SEVERE AND UNCONTROLLED asthma?             "  *NEED PAC APPT AT [facility]*     N 18. Are you currently taking any blood thinners?     18a. If yes, inform patient to \"follow up w/ ordering provider for bridging instructions.\"    N 19. Do you take the medication Phentermine?    19a. If yes, \"Hold for 7 days before procedure.  Please consult your prescribing provider if you have questions about holding this medication.\"     N  20. Do you have chronic kidney disease?      N  21. Do you have a diagnosis of diabetes?     N  22. On a regular basis do you go 3-5 days between bowel movements?     23. Preferred LOCAL Pharmacy for Pre Prescription    [ LIST ONLY ONE PHARMACY]     [id number] - [place], [place] - [address] [facility] [address]        - CLOSING REMINDERS -    Informed patient they will need an adult          Cannot take any type of public or medical transportation alone    Conscious Sedation- Needs  for 6 hours after the procedure        MAC/General-Needs  for 24 hours after procedure    Pre-Procedure Covid test to be completed         [[facility] [facility]/HOME Testing Required] + ADULT to ACCOMPANY     Confirmed Nurse will call to complete assessment       - SCHEDULING DETAILS -      Hospital Setting Required? If yes, what is the exclusion?:    N       Additional comments:  N      N  Surgeon   02/17/2023  Date of Procedure  MAC - 1ST AVAIL  Sedation Type     N  PAC / Pre-op Required   Location  [facility] [place]       Type of Procedure Scheduled  Upper Endoscopy [EGD]              "

## 2023-02-15 ENCOUNTER — ANESTHESIA EVENT (OUTPATIENT)
Dept: SURGERY | Facility: AMBULATORY SURGERY CENTER | Age: 48
End: 2023-02-15
Payer: COMMERCIAL

## 2023-02-15 NOTE — ANESTHESIA PREPROCEDURE EVALUATION
Anesthesia Pre-Procedure Evaluation    Patient: Bridgett Hardin   MRN: [id number] : 1975        Procedure : Procedure(s):  ESOPHAGOGASTRODUODENOSCOPY, WITH CO2 INSUFFLATION          No past medical history on file.   Past Surgical History:   Procedure Laterality Date     DILATION AND CURETTAGE SUCTION N/A 2018    Procedure: DILATION AND CURETTAGE SUCTION;  Suction D & C;  Surgeon: Edmond Garvin MD;  Location: [facility] OR      No Known Allergies   Social History     Tobacco Use     Smoking status: Never     Smokeless tobacco: Never   Substance Use Topics     Alcohol use: No      Wt Readings from Last 1 Encounters:   23 69.1 kg (152 lb 6.4 oz)           Physical Exam    Airway        Mallampati: III   TM distance: > 3 FB   Neck ROM: full   Mouth opening: > 3 cm    Respiratory Devices and Support         Dental       (+) Multiple crowns, permanant bridges      Cardiovascular   cardiovascular exam normal          Pulmonary   pulmonary exam normal                OUTSIDE LABS:  CBC:   Lab Results   Component Value Date    WBC 8.5 2022    WBC 8.5 2019    HGB 13.8 2022    HGB 13.5 2019    HCT 42.4 2022    HCT 39.3 2019     2022     2019     BMP:   Lab Results   Component Value Date     2022    POTASSIUM 4.1 2022    CHLORIDE 107 2022    CO2 25 2022    BUN 13 2022    CR 0.65 2022     (H) 2022     (H) 2022     COAGS:   Lab Results   Component Value Date    INR 1.04 2022     POC:   Lab Results   Component Value Date    HCG Negative 2019     HEPATIC:   Lab Results   Component Value Date    ALBUMIN 3.4 2022    PROTTOTAL 7.7 2022    ALT 44 2022    AST 22 2022    ALKPHOS 94 2022    BILITOTAL 0.4 2022     OTHER:   Lab Results   Component Value Date    A1C 6.3 (H) 2022    EDWARD 8.9 2022    LIPASE 153 2022    AMYLASE 61 2022     TSH 1.63 03/18/2022    T4 0.83 01/21/2022       Anesthesia Plan    ASA Status:  2   NPO Status:  NPO Appropriate    Anesthesia Type: MAC.     - Reason for MAC: straight local not clinically adequate   Induction: Intravenous, Propofol.   Maintenance: TIVA.        Consents    Anesthesia Plan(s) and associated risks, benefits, and realistic alternatives discussed. Questions answered and patient/representative(s) expressed understanding.    - Discussed:     - Discussed with:  Patient      - Extended Intubation/Ventilatory Support Discussed: No.      - Patient is DNR/DNI Status: No    Use of blood products discussed: No .     Postoperative Care       PONV prophylaxis: Ondansetron (or other 5HT-3), Background Propofol Infusion     Comments:                Tera Adler MD

## 2023-02-17 ENCOUNTER — HOSPITAL ENCOUNTER (OUTPATIENT)
Facility: AMBULATORY SURGERY CENTER | Age: 48
Discharge: HOME | End: 2023-02-17
Attending: INTERNAL MEDICINE
Payer: COMMERCIAL

## 2023-02-17 ENCOUNTER — ANESTHESIA (OUTPATIENT)
Dept: SURGERY | Facility: AMBULATORY SURGERY CENTER | Age: 48
End: 2023-02-17
Payer: COMMERCIAL

## 2023-02-17 VITALS
OXYGEN SATURATION: 99 % | RESPIRATION RATE: 16 BRPM | HEART RATE: 71 BPM | SYSTOLIC BLOOD PRESSURE: 139 MMHG | DIASTOLIC BLOOD PRESSURE: 77 MMHG

## 2023-02-17 VITALS — HEART RATE: 92 BPM

## 2023-02-17 LAB — UPPER GI ENDOSCOPY: NORMAL

## 2023-02-17 PROCEDURE — G8918 PT W/O PREOP ORDER IV AB PRO: HCPCS

## 2023-02-17 PROCEDURE — G8907 PT DOC NO EVENTS ON DISCHARG: HCPCS

## 2023-02-17 PROCEDURE — 43239 EGD BIOPSY SINGLE/MULTIPLE: CPT

## 2023-02-17 PROCEDURE — 88305 TISSUE EXAM BY PATHOLOGIST: CPT | Performed by: STUDENT IN AN ORGANIZED HEALTH CARE EDUCATION/TRAINING PROGRAM

## 2023-02-17 PROCEDURE — 88342 IMHCHEM/IMCYTCHM 1ST ANTB: CPT | Performed by: STUDENT IN AN ORGANIZED HEALTH CARE EDUCATION/TRAINING PROGRAM

## 2023-02-17 RX ORDER — NALOXONE HYDROCHLORIDE 0.4 MG/ML
0.4 INJECTION, SOLUTION INTRAMUSCULAR; INTRAVENOUS; SUBCUTANEOUS
Status: DISCONTINUED | OUTPATIENT
Start: 2023-02-17 | End: 2023-02-18 | Stop reason: HOSPADM

## 2023-02-17 RX ORDER — SODIUM CHLORIDE, SODIUM LACTATE, POTASSIUM CHLORIDE, CALCIUM CHLORIDE 600; 310; 30; 20 MG/100ML; MG/100ML; MG/100ML; MG/100ML
INJECTION, SOLUTION INTRAVENOUS CONTINUOUS
Status: DISCONTINUED | OUTPATIENT
Start: 2023-02-17 | End: 2023-02-18 | Stop reason: HOSPADM

## 2023-02-17 RX ORDER — LIDOCAINE 40 MG/G
CREAM TOPICAL
Status: DISCONTINUED | OUTPATIENT
Start: 2023-02-17 | End: 2023-02-18 | Stop reason: HOSPADM

## 2023-02-17 RX ORDER — FLUMAZENIL 0.1 MG/ML
0.2 INJECTION, SOLUTION INTRAVENOUS
Status: ACTIVE | OUTPATIENT
Start: 2023-02-17 | End: 2023-02-17

## 2023-02-17 RX ORDER — ONDANSETRON 2 MG/ML
4 INJECTION INTRAMUSCULAR; INTRAVENOUS EVERY 6 HOURS PRN
Status: DISCONTINUED | OUTPATIENT
Start: 2023-02-17 | End: 2023-02-18 | Stop reason: HOSPADM

## 2023-02-17 RX ORDER — ONDANSETRON 2 MG/ML
4 INJECTION INTRAMUSCULAR; INTRAVENOUS
Status: DISCONTINUED | OUTPATIENT
Start: 2023-02-17 | End: 2023-02-18 | Stop reason: HOSPADM

## 2023-02-17 RX ORDER — PROPOFOL 10 MG/ML
INJECTION, EMULSION INTRAVENOUS PRN
Status: DISCONTINUED | OUTPATIENT
Start: 2023-02-17 | End: 2023-02-17

## 2023-02-17 RX ORDER — PROCHLORPERAZINE MALEATE 10 MG
10 TABLET ORAL EVERY 6 HOURS PRN
Status: DISCONTINUED | OUTPATIENT
Start: 2023-02-17 | End: 2023-02-18 | Stop reason: HOSPADM

## 2023-02-17 RX ORDER — NALOXONE HYDROCHLORIDE 0.4 MG/ML
0.2 INJECTION, SOLUTION INTRAMUSCULAR; INTRAVENOUS; SUBCUTANEOUS
Status: DISCONTINUED | OUTPATIENT
Start: 2023-02-17 | End: 2023-02-18 | Stop reason: HOSPADM

## 2023-02-17 RX ORDER — LIDOCAINE HYDROCHLORIDE 20 MG/ML
INJECTION, SOLUTION INFILTRATION; PERINEURAL PRN
Status: DISCONTINUED | OUTPATIENT
Start: 2023-02-17 | End: 2023-02-17

## 2023-02-17 RX ORDER — ONDANSETRON 4 MG/1
4 TABLET, ORALLY DISINTEGRATING ORAL EVERY 6 HOURS PRN
Status: DISCONTINUED | OUTPATIENT
Start: 2023-02-17 | End: 2023-02-18 | Stop reason: HOSPADM

## 2023-02-17 RX ADMIN — SODIUM CHLORIDE, SODIUM LACTATE, POTASSIUM CHLORIDE, CALCIUM CHLORIDE: 600; 310; 30; 20 INJECTION, SOLUTION INTRAVENOUS at 07:28

## 2023-02-17 RX ADMIN — PROPOFOL 50 MG: 10 INJECTION, EMULSION INTRAVENOUS at 07:33

## 2023-02-17 RX ADMIN — PROPOFOL 30 MG: 10 INJECTION, EMULSION INTRAVENOUS at 07:34

## 2023-02-17 RX ADMIN — LIDOCAINE HYDROCHLORIDE 100 MG: 20 INJECTION, SOLUTION INFILTRATION; PERINEURAL at 07:33

## 2023-02-17 NOTE — H&P
[facility] Anesthesia Pre-op History and Physical    Bridgett Hardin MRN# [id number]   Age: 47 year old YOB: 1975     Date of Exam 2023         Primary care provider: America Alvarez         Chief Complaint and/or Reason for Procedure:     Epigastric pain - history of h. Pylori - did complete triple therapy around 2022         Active problem list:     Patient Active Problem List    Diagnosis Date Noted     Missed period 2019     Priority: Medium     History of miscarriage 2019     Priority: Medium     Recurrent UTI 2019     Priority: Medium     Threatened  2019     Priority: Medium     Hepatitis B, chronic (H) 2018     Priority: Medium     Hepatitis C, chronic (H) 2018     Priority: Medium     Cold sore 2018     Priority: Medium     Supervision of high-risk pregnancy of elderly multigravida 2018     Priority: Medium     Hypothyroidism, unspecified type 2018     Priority: Medium            Medications (include herbals and vitamins):   Any Plavix use in the last 7 days? No     Current Outpatient Medications   Medication Sig     cyclobenzaprine (FLEXERIL) 10 MG tablet Take 1 tablet (10 mg) by mouth nightly as needed for muscle spasms     levothyroxine (SYNTHROID/LEVOTHROID) 75 MCG tablet TAKE 1 TABLET(75 MCG) BY MOUTH DAILY     omeprazole (PRILOSEC) 20 MG DR capsule Take 1 capsule (20 mg) by mouth daily for 30 days     Current Facility-Administered Medications   Medication     lactated ringers infusion     lidocaine (LMX4) kit     lidocaine 1 % 0.1-1 mL     ondansetron (ZOFRAN) injection 4 mg     sodium chloride (PF) 0.9% PF flush 3 mL     sodium chloride (PF) 0.9% PF flush 3 mL             Allergies:    No Known Allergies  Allergy to Latex? No  Allergy to tape?   No  Intolerances:             Physical Exam:   All vitals have been reviewed  Patient Vitals for the past 8 hrs:   BP Temp src Pulse Resp SpO2   23 0719  (!) 141/97 Temporal 71 16 97 %     No intake/output data recorded.  Lungs:   No increased work of breathing, good air exchange, clear to auscultation bilaterally, no crackles or wheezing     Cardiovascular:   normal S1 and S2             Lab / Radiology Results:            Anesthetic risk and/or ASA classification:     2  Naomi Bray DO

## 2023-02-17 NOTE — ANESTHESIA POSTPROCEDURE EVALUATION
Patient: Bridgett Hardin    Procedure: Procedure(s):  ESOPHAGOGASTRODUODENOSCOPY, WITH BIOPSY       Anesthesia Type:  MAC    Note:  Disposition: Outpatient   Postop Pain Control: Uneventful            Sign Out: Well controlled pain   PONV:    Neuro/Psych: Uneventful            Sign Out: Acceptable/Baseline neuro status   Airway/Respiratory: Uneventful            Sign Out: Acceptable/Baseline resp. status   CV/Hemodynamics: Uneventful            Sign Out: Acceptable CV status; No obvious hypovolemia; No obvious fluid overload   Other NRE:    DID A NON-ROUTINE EVENT OCCUR?            Last vitals:  Vitals Value Taken Time   /77 02/17/23 0809   Temp     Pulse     Resp 16 02/17/23 0809   SpO2 99 % 02/17/23 0809       Electronically Signed By: Tera Adler MD  February 17, 2023  3:08 PM

## 2023-02-17 NOTE — ANESTHESIA CARE TRANSFER NOTE
Patient: Bridgett Hardin    Procedure: Procedure(s):  ESOPHAGOGASTRODUODENOSCOPY, WITH BIOPSY       Diagnosis: Epigastric pain [R10.13]  Diagnosis Additional Information: No value filed.    Anesthesia Type:   MAC     Note:    Oropharynx: oropharynx clear of all foreign objects  Level of Consciousness: drowsy  Oxygen Supplementation: room air    Independent Airway: airway patency satisfactory and stable  Dentition: dentition unchanged  Vital Signs Stable: post-procedure vital signs reviewed and stable  Report to RN Given: handoff report given  Patient transferred to: Phase II  Comments: RN states comfortable with patient.        Vitals:  Vitals Value Taken Time   BP     Temp     Pulse 92 02/17/23 0736   Resp     SpO2         Electronically Signed By: CORINNE Haley CRNA  February 17, 2023  7:42 AM

## 2023-02-17 NOTE — RESULT ENCOUNTER NOTE
Ricardo Urias,    Thank you for your recent office visit.    Here are your recent results.  Per GI-Impression:               - Z-line regular, 37 cm from the incisors.                             - Gastroesophageal flap valve classified as Hill                             Grade II (fold present, opens with respiration).                             - Gastritis. Biopsied.                             - Normal examined duodenum.   Recommendation:           - Await pathology results.                             - Patient has a contact number available for                             emergencies. The signs and symptoms of potential                             delayed complications were discussed with the                             patient. Return to normal activities tomorrow.                             Written discharge instructions were provided to the                             patient.                                            Feel free to contact me via [employer] ([employer]: [place]) or call the clinic at [phone].    Sincerely,    America Andre, CORINNE, FNP-BC

## 2023-02-23 LAB
PATH REPORT.COMMENTS IMP SPEC: NORMAL
PATH REPORT.COMMENTS IMP SPEC: NORMAL
PATH REPORT.FINAL DX SPEC: NORMAL
PATH REPORT.GROSS SPEC: NORMAL
PATH REPORT.MICROSCOPIC SPEC OTHER STN: NORMAL
PATH REPORT.RELEVANT HX SPEC: NORMAL
PHOTO IMAGE: NORMAL

## 2023-03-09 ENCOUNTER — TELEPHONE (OUTPATIENT)
Dept: URGENT CARE | Facility: URGENT CARE | Age: 48
End: 2023-03-09
Payer: COMMERCIAL

## 2023-03-09 NOTE — TELEPHONE ENCOUNTER
Pharmacy /patient requesting  med last sent from Urgent Care:    omeprazole (PRILOSEC) 20 MG DR capsule

## 2023-03-09 NOTE — TELEPHONE ENCOUNTER
Pt's primary care provider with [facility]. Needs visit or can request from primary care provider team.

## 2023-03-16 NOTE — PROGRESS NOTES
"  Assessment & Plan     Cervical cancer screening    - Pap Screen with HPV - recommended age 30 - 65 years    Visit for screening mammogram    - *MA Screening Digital Bilateral; Future    Encounter for screening mammogram for breast cancer      Hypothyroidism, unspecified type    - TSH WITH FREE T4 REFLEX; Future  - levothyroxine (SYNTHROID/LEVOTHROID) 75 MCG tablet; TAKE 1 TABLET(75 MCG) BY MOUTH DAILY  - TSH WITH FREE T4 REFLEX    History of hepatitis B    - Hepatic panel (Albumin, ALT, AST, Bili, Alk Phos, TP); Future  - US Abdomen Limited; Future  - Hepatic panel (Albumin, ALT, AST, Bili, Alk Phos, TP)    Hepatitis B, chronic (H)      Chronic bilateral thoracic back pain    - Spine  Referral; Future  - celecoxib (CELEBREX) 100 MG capsule; Take 1 capsule (100 mg) by mouth 2 times daily Take with food  - HYDROcodone-acetaminophen (NORCO) 5-325 MG tablet; Take 1 tablet by mouth every 6 hours as needed for pain (use sparingly, do not drive after taking)  - tiZANidine (ZANAFLEX) 4 MG tablet; Take 1 tablet (4 mg) by mouth 2 times daily as needed for muscle spasms (thoracic back pain)      30 minutes spent on the date of the encounter doing chart review, history and exam, documentation and further activities per the note     BMI:   Estimated body mass index is 30.56 kg/m  as calculated from the following:    Height as of this encounter: 1.508 m (4' 11.37\").    Weight as of this encounter: 69.5 kg (153 lb 3.2 oz).     See Patient Instructions: Discussed take medication as prescribed, needed, use pain medication sparingly.  Look for email from [employer] regarding medical marijuana for her pain.  Continue massage, chiropractor, cupping as able for her symptoms.  Schedule with spine for further evaluation.  Let us know if medication is helping or not.  We will let you know lab results, if abnormal a treatment plan.  Schedule mammogram and ultrasound.  Patient reports she had colon cancer " screening from [facility] completed that was normal.  Follow-up as needed for healthcare questions concerns or worsening symptoms.  Review after visit summary tips.    Return in about 6 months (around 9/17/2023), or if symptoms worsen or fail to improve.    EDITH LÓPEZ  [facility] [place] Urias is a 47 year old, presenting for the following health issues:  Recheck Medication, Pain, and Medication Reconciliation (Patient has been taking omeprazole. )    Patient has been having back pain and abdominal pain. Patient is also taking omeprazole and would like a medication refill as she feels this medication is helping her abdominal pain. Patient would also like to have PAP completed today.     Per chart review it appears she went to urgent care in January.  They gave her Flexeril, Tylenol, and omeprazole.  They did an x-ray of her thoracic spine which did show curvature to the right; they note possible narrowing of the T4 vertebrae.  She has been doing massage she tried chiropractor, acupuncture, home physical therapy stretches she feels that her upper back pain is constant and worsening.  At times it is midline, at times it is on the left or right but it is constant.  Currently the pain is rated 6 out of 10.  She denies night sweats or unexplained weight loss or injury.  At times the pain wraps around to her front rib cage, she denies shortness of breath or trouble breathing nor heart pain.  The pain medication given to her did not help at all.  The omeprazole is helping her stomach.  She has a history of chronic hepatitis she would like liver function checked as well as liver ultrasound as she has not had this done in a while.  Discussed thyroid lab needs to be done for refill she is tolerating her medication.  Discussed trying alternative pain medication, take strong anti-inflammatory with food we will try different muscle relaxer, use narcotics sparingly, do not drive.   "Discussed medical marijuana she is interested in trying this possibly topically.  Discussed process of medical marijuana certification, she will receive an email from the [place] [employer] follow their directions to remain certified for example do not drive under the influence, do not share as this is a medication.  She will then be notified how to schedule with the dispensary pharmacist to discuss the types of medical marijuana for her current symptoms.  Patient in agreement to this.  Additionally advised her to schedule with spine in case pain persists they can discuss next steps which may be injection versus surgery.  She would like to wait on MRI.    She reports [facility] a colon cancer screening that was negative.  She is due for a mammogram and we will complete Pap today.    History of Present Illness       Reason for visit:  Upper backach borthering    She eats 2-3 servings of fruits and vegetables daily.She consumes 1 sweetened beverage(s) daily.She exercises with enough effort to increase her heart rate 20 to 29 minutes per day.  She exercises with enough effort to increase her heart rate 4 days per week.   She is taking medications regularly.       Medication Followup of levothyroxine     Taking Medication as prescribed: yes    Side Effects:  None    Medication Helping Symptoms:  yes      Review of Systems   Constitutional, HEENT, cardiovascular, pulmonary, GI, , musculoskeletal, neuro, skin, endocrine and psych systems are negative, except as otherwise noted.      Objective    /84   Pulse 91   Temp 99  F (37.2  C) (Temporal)   Resp 22   Ht 1.508 m (4' 11.37\")   Wt 69.5 kg (153 lb 3.2 oz)   LMP 02/16/2023 (Approximate)   SpO2 99%   BMI 30.56 kg/m    Body mass index is 30.56 kg/m .  Physical Exam   GENERAL: healthy, alert and no distress  NECK: no adenopathy, no asymmetry, masses, or scars and thyroid normal to palpation  RESP: lungs clear to auscultation - no rales, " rhonchi or wheezes; no pain with deep breaths  CV: regular rate and rhythm, normal S1 S2, no S3 or S4, no murmur, click or rub, no peripheral edema and peripheral pulses strong  MS: no gross musculoskeletal defects noted, no edema No CVA tenderness, no pain with palpation of midline spine, no pain with palpation of anterior ribs POSITIVE for pain with palpation of left thoracic spine/muscles tense  SKIN: no suspicious lesions or rashes  NEURO: Normal strength and tone in upper extremities against resistance, normal range of motion of neck, mentation intact and speech normal  PSYCH: mentation appears normal, affect normal/bright    See orders

## 2023-03-17 ENCOUNTER — OFFICE VISIT (OUTPATIENT)
Dept: FAMILY MEDICINE | Facility: CLINIC | Age: 48
End: 2023-03-17
Payer: COMMERCIAL

## 2023-03-17 VITALS
RESPIRATION RATE: 22 BRPM | SYSTOLIC BLOOD PRESSURE: 124 MMHG | TEMPERATURE: 99 F | OXYGEN SATURATION: 99 % | HEIGHT: 59 IN | BODY MASS INDEX: 30.88 KG/M2 | DIASTOLIC BLOOD PRESSURE: 84 MMHG | WEIGHT: 153.2 LBS | HEART RATE: 91 BPM

## 2023-03-17 DIAGNOSIS — E03.9 HYPOTHYROIDISM, UNSPECIFIED TYPE: ICD-10-CM

## 2023-03-17 DIAGNOSIS — M54.6 CHRONIC BILATERAL THORACIC BACK PAIN: ICD-10-CM

## 2023-03-17 DIAGNOSIS — B18.1 HEPATITIS B, CHRONIC (H): ICD-10-CM

## 2023-03-17 DIAGNOSIS — Z86.19 HISTORY OF HEPATITIS B: Primary | ICD-10-CM

## 2023-03-17 DIAGNOSIS — K21.00 GASTROESOPHAGEAL REFLUX DISEASE WITH ESOPHAGITIS WITHOUT HEMORRHAGE: ICD-10-CM

## 2023-03-17 DIAGNOSIS — Z12.4 CERVICAL CANCER SCREENING: ICD-10-CM

## 2023-03-17 DIAGNOSIS — Z09 FOLLOW-UP EXAM: ICD-10-CM

## 2023-03-17 DIAGNOSIS — Z12.31 ENCOUNTER FOR SCREENING MAMMOGRAM FOR BREAST CANCER: ICD-10-CM

## 2023-03-17 DIAGNOSIS — G89.29 CHRONIC BILATERAL THORACIC BACK PAIN: ICD-10-CM

## 2023-03-17 DIAGNOSIS — Z12.31 VISIT FOR SCREENING MAMMOGRAM: ICD-10-CM

## 2023-03-17 LAB
ALBUMIN SERPL-MCNC: 3.5 G/DL (ref 3.4–5)
ALP SERPL-CCNC: 99 U/L (ref 40–150)
ALT SERPL W P-5'-P-CCNC: 25 U/L (ref 0–50)
AST SERPL W P-5'-P-CCNC: 15 U/L (ref 0–45)
BILIRUB DIRECT SERPL-MCNC: <0.1 MG/DL (ref 0–0.2)
BILIRUB SERPL-MCNC: 0.3 MG/DL (ref 0.2–1.3)
PROT SERPL-MCNC: 7.8 G/DL (ref 6.8–8.8)
TSH SERPL DL<=0.005 MIU/L-ACNC: 1.21 MU/L (ref 0.4–4)

## 2023-03-17 PROCEDURE — 36415 COLL VENOUS BLD VENIPUNCTURE: CPT | Performed by: NURSE PRACTITIONER

## 2023-03-17 PROCEDURE — 84443 ASSAY THYROID STIM HORMONE: CPT | Performed by: NURSE PRACTITIONER

## 2023-03-17 PROCEDURE — G0145 SCR C/V CYTO,THINLAYER,RESCR: HCPCS | Performed by: NURSE PRACTITIONER

## 2023-03-17 PROCEDURE — 99214 OFFICE O/P EST MOD 30 MIN: CPT | Performed by: NURSE PRACTITIONER

## 2023-03-17 PROCEDURE — 87624 HPV HI-RISK TYP POOLED RSLT: CPT | Performed by: NURSE PRACTITIONER

## 2023-03-17 PROCEDURE — 80076 HEPATIC FUNCTION PANEL: CPT | Performed by: NURSE PRACTITIONER

## 2023-03-17 RX ORDER — HYDROCODONE BITARTRATE AND ACETAMINOPHEN 5; 325 MG/1; MG/1
1 TABLET ORAL EVERY 6 HOURS PRN
Qty: 12 TABLET | Refills: 0 | Status: SHIPPED | OUTPATIENT
Start: 2023-03-17 | End: 2023-03-20

## 2023-03-17 RX ORDER — LEVOTHYROXINE SODIUM 75 UG/1
TABLET ORAL
Qty: 90 TABLET | Refills: 3 | Status: SHIPPED | OUTPATIENT
Start: 2023-03-17 | End: 2023-05-12

## 2023-03-17 RX ORDER — CELECOXIB 100 MG/1
100 CAPSULE ORAL 2 TIMES DAILY
Qty: 180 CAPSULE | Refills: 1 | Status: SHIPPED | OUTPATIENT
Start: 2023-03-17 | End: 2023-07-28

## 2023-03-17 ASSESSMENT — PAIN SCALES - GENERAL: PAINLEVEL: EXTREME PAIN (8)

## 2023-03-17 NOTE — RESULT ENCOUNTER NOTE
Ricardo Urias,    Thank you for your recent office visit.    Here are your recent results.  Normal blood labs    Feel free to contact me via [employer] or call the clinic at [phone].    Sincerely,    CORINNE Anand, FNP-BC

## 2023-03-21 LAB
BKR LAB AP GYN ADEQUACY: NORMAL
BKR LAB AP GYN INTERPRETATION: NORMAL
BKR LAB AP HPV REFLEX: NORMAL
BKR LAB AP PREVIOUS ABNORMAL: NORMAL
PATH REPORT.COMMENTS IMP SPEC: NORMAL
PATH REPORT.COMMENTS IMP SPEC: NORMAL
PATH REPORT.RELEVANT HX SPEC: NORMAL

## 2023-03-22 LAB
HUMAN PAPILLOMA VIRUS 16 DNA: NEGATIVE
HUMAN PAPILLOMA VIRUS 18 DNA: NEGATIVE
HUMAN PAPILLOMA VIRUS FINAL DIAGNOSIS: NORMAL
HUMAN PAPILLOMA VIRUS OTHER HR: NEGATIVE

## 2023-03-31 ENCOUNTER — ANCILLARY PROCEDURE (OUTPATIENT)
Dept: ULTRASOUND IMAGING | Facility: CLINIC | Age: 48
End: 2023-03-31
Attending: NURSE PRACTITIONER
Payer: COMMERCIAL

## 2023-03-31 DIAGNOSIS — Z86.19 HISTORY OF HEPATITIS B: ICD-10-CM

## 2023-03-31 PROCEDURE — 76705 ECHO EXAM OF ABDOMEN: CPT | Mod: TC | Performed by: RADIOLOGY

## 2023-04-03 NOTE — RESULT ENCOUNTER NOTE
Ricardo Urias,    Thank you for your recent office visit.    Here are your recent results.  Per radiologist:                                                                  IMPRESSION:  1.  Stable hepatic steatosis (fatty liver). No evidence for cirrhosis or focal liver  lesion.  2.  Stable borderline extra hepatic biliary ductal dilatation. No  evidence for choledocholithiasis.    Feel free to contact me via [employer] or call the clinic at [phone].    Sincerely,    Ameirca Andre, CORINNE, FNP-BC

## 2023-04-21 ENCOUNTER — ANCILLARY PROCEDURE (OUTPATIENT)
Dept: MAMMOGRAPHY | Facility: CLINIC | Age: 48
End: 2023-04-21
Attending: NURSE PRACTITIONER
Payer: COMMERCIAL

## 2023-04-21 DIAGNOSIS — Z12.31 VISIT FOR SCREENING MAMMOGRAM: ICD-10-CM

## 2023-04-21 PROCEDURE — 77067 SCR MAMMO BI INCL CAD: CPT | Mod: TC | Performed by: RADIOLOGY

## 2023-05-06 ENCOUNTER — HEALTH MAINTENANCE LETTER (OUTPATIENT)
Age: 48
End: 2023-05-06

## 2023-05-12 ENCOUNTER — TELEPHONE (OUTPATIENT)
Dept: FAMILY MEDICINE | Facility: CLINIC | Age: 48
End: 2023-05-12
Payer: COMMERCIAL

## 2023-05-12 DIAGNOSIS — E03.9 HYPOTHYROIDISM, UNSPECIFIED TYPE: ICD-10-CM

## 2023-05-12 RX ORDER — LEVOTHYROXINE SODIUM 75 UG/1
TABLET ORAL
Qty: 90 TABLET | Refills: 2 | Status: SHIPPED | OUTPATIENT
Start: 2023-05-12 | End: 2023-07-28

## 2023-05-12 NOTE — TELEPHONE ENCOUNTER
"Looks like she should have refills of 75 mcg levothyroxine at pharmacy?      I called pharmacy, they had this Rx but it was \"closed out of their system for some reason\" so need new Rx sent.    I re-sent the remaining refills again as \"provider recommended\" now.    Paty Mendes RN  [facility]              "

## 2023-05-12 NOTE — TELEPHONE ENCOUNTER
Medication Question or Refill        What medication are you calling about (include dose and sig)?: levothyroxine (SYNTHROID/LEVOTHROID) 75 MCG tablet    Preferred Pharmacy:   [id number] - [place], [place] - [address]  [address]  [facility] [address]  Phone: [phone] Fax: [phone]      Controlled Substance Agreement on file:   CSA -- Patient Level:    CSA: None found at the patient level.       Who prescribed the medication?: PCP    Do you need a refill? Yes, no pills left pt stated    When did you use the medication last? na    Patient offered an appointment? No    Do you have any questions or concerns?  No      Could we send this information to you in [facility] or would you prefer to receive a phone call?:   Patient would prefer a phone call   Okay to leave a detailed message?: Yes at Cell number on file:    Telephone Information:   Mobile [phone]

## 2023-06-29 ENCOUNTER — TELEPHONE (OUTPATIENT)
Dept: FAMILY MEDICINE | Facility: CLINIC | Age: 48
End: 2023-06-29
Payer: COMMERCIAL

## 2023-06-29 NOTE — TELEPHONE ENCOUNTER
Patient has refills already at the pharmacy.    Called and notified patient.      Brittany RN BSN  Triage Nurse  [facility]: [facility]  Ph: [phone]

## 2023-06-29 NOTE — TELEPHONE ENCOUNTER
Medication Question or Refill    Contacts       Type Contact Phone/Fax    06/29/2023 07:41 AM CDT Phone (Incoming) Bridgett Hardin (Self) [phone] (M)          What medication are you calling about (include dose and sig)?: omeprazole (PRILOSEC) 20 MG      Preferred Pharmacy:   [facility] #[id number] - [facility] [address] [place] [address]  [address]  [facility] [address]  Phone: [phone] Fax: [phone]      Controlled Substance Agreement on file:   CSA -- Patient Level:    CSA: None found at the patient level.       Who prescribed the medication?: America Andre    Do you need a refill? Yes    When did you use the medication last? 3 days ago    Patient offered an appointment? Yes: 7/28/2023    Do you have any questions or concerns?  Yes: wants status of refill      Could we send this information to you in [facility] or would you prefer to receive a phone call?:   Patient would prefer a phone call   Okay to leave a detailed message?: Yes at Cell number on file:    Telephone Information:   Mobile [phone]

## 2023-07-25 ASSESSMENT — ENCOUNTER SYMPTOMS
NAUSEA: 0
MYALGIAS: 1
HEADACHES: 0
SHORTNESS OF BREATH: 0
PALPITATIONS: 0
CHILLS: 0
FEVER: 0
SORE THROAT: 0
JOINT SWELLING: 0
BREAST MASS: 0
WEAKNESS: 0
ARTHRALGIAS: 0
HEARTBURN: 1
COUGH: 0
DYSURIA: 0
HEMATOCHEZIA: 0
DIARRHEA: 0
HEMATURIA: 0
DIZZINESS: 0
PARESTHESIAS: 0
ABDOMINAL PAIN: 0
NERVOUS/ANXIOUS: 0
CONSTIPATION: 0
EYE PAIN: 0
FREQUENCY: 0

## 2023-07-28 ENCOUNTER — OFFICE VISIT (OUTPATIENT)
Dept: FAMILY MEDICINE | Facility: CLINIC | Age: 48
End: 2023-07-28
Payer: COMMERCIAL

## 2023-07-28 ENCOUNTER — ANCILLARY PROCEDURE (OUTPATIENT)
Dept: GENERAL RADIOLOGY | Facility: CLINIC | Age: 48
End: 2023-07-28
Attending: NURSE PRACTITIONER
Payer: COMMERCIAL

## 2023-07-28 VITALS
SYSTOLIC BLOOD PRESSURE: 124 MMHG | HEART RATE: 80 BPM | DIASTOLIC BLOOD PRESSURE: 100 MMHG | HEIGHT: 59 IN | WEIGHT: 151.2 LBS | BODY MASS INDEX: 30.48 KG/M2 | OXYGEN SATURATION: 98 % | RESPIRATION RATE: 20 BRPM | TEMPERATURE: 97.2 F

## 2023-07-28 DIAGNOSIS — G89.29 CHRONIC BILATERAL THORACIC BACK PAIN: ICD-10-CM

## 2023-07-28 DIAGNOSIS — G56.01 CARPAL TUNNEL SYNDROME OF RIGHT WRIST: ICD-10-CM

## 2023-07-28 DIAGNOSIS — E03.9 ACQUIRED HYPOTHYROIDISM: ICD-10-CM

## 2023-07-28 DIAGNOSIS — Z12.11 SCREEN FOR COLON CANCER: ICD-10-CM

## 2023-07-28 DIAGNOSIS — M77.32 HEEL SPUR, LEFT: ICD-10-CM

## 2023-07-28 DIAGNOSIS — M79.672 PAIN OF LEFT HEEL: ICD-10-CM

## 2023-07-28 DIAGNOSIS — Z00.00 LABORATORY EXAMINATION ORDERED AS PART OF A ROUTINE GENERAL MEDICAL EXAMINATION: ICD-10-CM

## 2023-07-28 DIAGNOSIS — M54.6 CHRONIC BILATERAL THORACIC BACK PAIN: ICD-10-CM

## 2023-07-28 DIAGNOSIS — K21.00 GASTROESOPHAGEAL REFLUX DISEASE WITH ESOPHAGITIS WITHOUT HEMORRHAGE: Primary | ICD-10-CM

## 2023-07-28 DIAGNOSIS — R03.0 ELEVATED BLOOD PRESSURE READING WITHOUT DIAGNOSIS OF HYPERTENSION: ICD-10-CM

## 2023-07-28 DIAGNOSIS — Z13.1 SCREENING FOR DIABETES MELLITUS: ICD-10-CM

## 2023-07-28 DIAGNOSIS — E03.9 HYPOTHYROIDISM, UNSPECIFIED TYPE: ICD-10-CM

## 2023-07-28 DIAGNOSIS — Z13.220 LIPID SCREENING: ICD-10-CM

## 2023-07-28 DIAGNOSIS — Z00.00 ROUTINE GENERAL MEDICAL EXAMINATION AT A HEALTH CARE FACILITY: ICD-10-CM

## 2023-07-28 PROCEDURE — 84443 ASSAY THYROID STIM HORMONE: CPT | Performed by: NURSE PRACTITIONER

## 2023-07-28 PROCEDURE — 73650 X-RAY EXAM OF HEEL: CPT | Mod: TC | Performed by: RADIOLOGY

## 2023-07-28 PROCEDURE — 90471 IMMUNIZATION ADMIN: CPT | Performed by: NURSE PRACTITIONER

## 2023-07-28 PROCEDURE — 99396 PREV VISIT EST AGE 40-64: CPT | Mod: 25 | Performed by: NURSE PRACTITIONER

## 2023-07-28 PROCEDURE — 82570 ASSAY OF URINE CREATININE: CPT | Performed by: NURSE PRACTITIONER

## 2023-07-28 PROCEDURE — 99214 OFFICE O/P EST MOD 30 MIN: CPT | Mod: 25 | Performed by: NURSE PRACTITIONER

## 2023-07-28 PROCEDURE — 80061 LIPID PANEL: CPT | Performed by: NURSE PRACTITIONER

## 2023-07-28 PROCEDURE — 91313 COVID-19 BIVALENT 18+ (MODERNA): CPT | Performed by: NURSE PRACTITIONER

## 2023-07-28 PROCEDURE — 82043 UR ALBUMIN QUANTITATIVE: CPT | Performed by: NURSE PRACTITIONER

## 2023-07-28 PROCEDURE — 90472 IMMUNIZATION ADMIN EACH ADD: CPT | Performed by: NURSE PRACTITIONER

## 2023-07-28 PROCEDURE — 36415 COLL VENOUS BLD VENIPUNCTURE: CPT | Performed by: NURSE PRACTITIONER

## 2023-07-28 PROCEDURE — 90715 TDAP VACCINE 7 YRS/> IM: CPT | Performed by: NURSE PRACTITIONER

## 2023-07-28 PROCEDURE — 90677 PCV20 VACCINE IM: CPT | Performed by: NURSE PRACTITIONER

## 2023-07-28 PROCEDURE — 80069 RENAL FUNCTION PANEL: CPT | Performed by: NURSE PRACTITIONER

## 2023-07-28 PROCEDURE — 0134A COVID-19 BIVALENT 18+ (MODERNA): CPT | Performed by: NURSE PRACTITIONER

## 2023-07-28 RX ORDER — LEVOTHYROXINE SODIUM 75 UG/1
TABLET ORAL
Qty: 90 TABLET | Refills: 3 | Status: SHIPPED | OUTPATIENT
Start: 2023-07-28 | End: 2024-08-27

## 2023-07-28 RX ORDER — HYDROCHLOROTHIAZIDE 12.5 MG/1
12.5 TABLET ORAL DAILY
Qty: 90 TABLET | Refills: 1 | Status: SHIPPED | OUTPATIENT
Start: 2023-07-28 | End: 2023-08-23

## 2023-07-28 ASSESSMENT — ENCOUNTER SYMPTOMS
ABDOMINAL PAIN: 0
HEMATOCHEZIA: 0
EYE PAIN: 0
CHILLS: 0
HEADACHES: 0
ARTHRALGIAS: 0
DIZZINESS: 0
COUGH: 0
DYSURIA: 0
FREQUENCY: 0
CONSTIPATION: 0
FEVER: 0
SORE THROAT: 0
DIARRHEA: 0
WEAKNESS: 0
HEARTBURN: 1
SHORTNESS OF BREATH: 0
NAUSEA: 0
MYALGIAS: 1
PARESTHESIAS: 0
PALPITATIONS: 0
JOINT SWELLING: 0
BREAST MASS: 0
NERVOUS/ANXIOUS: 0
HEMATURIA: 0

## 2023-07-28 ASSESSMENT — PAIN SCALES - GENERAL: PAINLEVEL: MODERATE PAIN (5)

## 2023-07-28 NOTE — NURSING NOTE
Prior to immunization administration, verified patients identity using patient s name and date of birth. Please see Immunization Activity for additional information.     Screening Questionnaire for Adult Immunization    Are you sick today?   No   Do you have allergies to medications, food, a vaccine component or latex?   No   Have you ever had a serious reaction after receiving a vaccination?   No   Do you have a long-term health problem with heart, lung, kidney, or metabolic disease (e.g., diabetes), asthma, a blood disorder, no spleen, complement component deficiency, a cochlear implant, or a spinal fluid leak?  Are you on long-term aspirin therapy?   No   Do you have cancer, leukemia, HIV/AIDS, or any other immune system problem?   No   Do you have a parent, brother, or sister with an immune system problem?   No   In the past 3 months, have you taken medications that affect  your immune system, such as prednisone, other steroids, or anticancer drugs; drugs for the treatment of rheumatoid arthritis, Crohn s disease, or psoriasis; or have you had radiation treatments?   No   Have you had a seizure, or a brain or other nervous system problem?   No   During the past year, have you received a transfusion of blood or blood    products, or been given immune (gamma) globulin or antiviral drug?   No   For women: Are you pregnant or is there a chance you could become       pregnant during the next month?   No   Have you received any vaccinations in the past 4 weeks?   No     Immunization questionnaire answers were all negative.      Patient instructed to remain in clinic for 15 minutes afterwards, and to report any adverse reactions.     Screening performed by Jamee Campos MA on 7/28/2023 at 10:33 AM.

## 2023-07-28 NOTE — PROGRESS NOTES
SUBJECTIVE:   CC: Bridgett is an 48 year old who presents for preventive health visit.       2023     9:16 AM   Additional Questions   Roomed by Jamee   Accompanied by n/a         2023     9:16 AM   Patient Reported Additional Medications   Patient reports taking the following new medications THC Cannabis Product green tablets 2.5mg CBD per tab.       Healthy Habits:     Getting at least 3 servings of Calcium per day:  Yes    Bi-annual eye exam:  NO    Dental care twice a year:  Yes    Sleep apnea or symptoms of sleep apnea:  None    Diet:  Regular (no restrictions)    Frequency of exercise:  2-3 days/week    Duration of exercise:  15-30 minutes    Taking medications regularly:  Yes    Medication side effects:  None    Additional concerns today:  Yes      Patient would still like to discuss the followin.) heel pain- worse after sitting for period of time and then getting up to walk. Pain is sharp, in one spot. When massages, feels like possible bony lump; unsure if could be bone growth. Xray shows 2 small bone spurs- see AVS.   2.) wrist pain-R wrist pain- down thumb into wrist. Worse with repetitive movements- works in medical assembly- repetitive tool. Rest helps. Wearing wrist brace helps- limits mobility. R hand weakness at times, has not been dropping things. Discussed carpal tunnel- brace, rest, heat, ice, stretches/exercises, voltaren gel, PT if needed/ surgery. Tips given on AVS.   3.) menstrual cycle- irregular menstrual cycle- age 48. Had menses , now July 10. Normal heaviness, no pain, normal duration 5-6 days. Discussed perimenopause. Menopause. When to be concerned- excessive bleeding-symptomatic- dizzy, SOB; period after not having period x 1 full year. Can still get pregnant. Tips given on AVS. Not having hot flashes, mood changes.       She is going to [facility] soon with  other people. Wanting her pneumonia vaccine, tdap, covid. Ok with blood labs. Needing  medication refill- thyroid, omeprazole- helps GERD, PRN tizanidine helps back pain.  Wanting heel xray.  Discussed PT referral if other interventions not helping. Wanting kidney function checked- was recommended by her insurance. Diastolic BP elevated x2, chart review shows this has been consistent, will try low dose dieuretic, instructions given to monitor BP. Discussed colon cancer screening she will let us know what she prefers to do once she returns from her trip.         Social History     Tobacco Use    Smoking status: Never    Smokeless tobacco: Never   Substance Use Topics    Alcohol use: No             2023    10:40 AM   Alcohol Use   Prescreen: >3 drinks/day or >7 drinks/week? No     Reviewed orders with patient.  Reviewed health maintenance and updated orders accordingly - Yes  Lab work is in process  Labs reviewed in EPIC  BP Readings from Last 3 Encounters:   23 (!) 124/100   23 124/84   23 139/77    Wt Readings from Last 3 Encounters:   23 68.6 kg (151 lb 3.2 oz)   23 69.5 kg (153 lb 3.2 oz)   23 69.1 kg (152 lb 6.4 oz)                  Patient Active Problem List   Diagnosis    Hypothyroidism, unspecified type    Supervision of high-risk pregnancy of elderly multigravida    Cold sore    Hepatitis B, chronic (H)    Hepatitis C, chronic (H)    Threatened     History of miscarriage    Recurrent UTI    Missed period    Chronic bilateral thoracic back pain    Gastroesophageal reflux disease with esophagitis without hemorrhage     Past Surgical History:   Procedure Laterality Date    COMBINED ESOPHAGOSCOPY, GASTROSCOPY, DUODENOSCOPY (EGD) WITH CO2 INSUFFLATION N/A 2023    Procedure: Combined Esophagoscopy, Gastroscopy, Duodenoscopy (Egd) With Co2 Insufflation;  Surgeon: Naomi Bray DO;  Location: [facility]    DILATION AND CURETTAGE SUCTION N/A 2018    Procedure: DILATION AND CURETTAGE SUCTION;  Suction D & C;  Surgeon: Edmond Garvin MD;   Location: [facility]    ESOPHAGOSCOPY, GASTROSCOPY, DUODENOSCOPY (EGD), COMBINED N/A 2023    Procedure: ESOPHAGOGASTRODUODENOSCOPY, WITH BIOPSY;  Surgeon: Naomi Bray DO;  Location: [facility]       Social History     Tobacco Use    Smoking status: Never    Smokeless tobacco: Never   Substance Use Topics    Alcohol use: No     History reviewed. No pertinent family history.      Current Outpatient Medications   Medication Sig Dispense Refill    diclofenac (VOLTAREN) 1 % topical gel Apply 4 g topically 4 times daily 350 g 4    hydrochlorothiazide (HYDRODIURIL) 12.5 MG tablet Take 1 tablet (12.5 mg) by mouth daily Is for 5 minutes and check blood pressure. Lt me know if BP not < 140/80 at rest 90 tablet 1    levothyroxine (SYNTHROID/LEVOTHROID) 75 MCG tablet TAKE 1 TABLET(75 MCG) BY MOUTH DAILY 90 tablet 3    omeprazole (PRILOSEC) 20 MG DR capsule Take 1 capsule (20 mg) by mouth daily 90 capsule 3    tiZANidine (ZANAFLEX) 4 MG tablet Take 1 tablet (4 mg) by mouth 2 times daily as needed for muscle spasms (thoracic back pain) 60 tablet 4     No Known Allergies  Recent Labs   Lab Test 23  0933 22  0822 22  1633 22  1054 20  1542 20  1001 18  1111 18  1650   A1C  --   --  6.3*  --   --   --   --  5.8   LDL  --   --   --  83  --   --   --  88   HDL  --   --   --  54  --   --   --  56   TRIG  --   --   --  130  --   --   --  94   ALT 25 44  --   --   --  33   < >  --    CR  --  0.65  --   --   --   --   --   --    GFRESTIMATED  --  >90  --   --   --   --   --   --    POTASSIUM  --  4.1  --   --   --   --   --   --    TSH 1.21 1.63  --  5.17*   < > 1.47   < > 5.85*    < > = values in this interval not displayed.        Breast Cancer Screenin/21/2022    10:05 AM   Breast CA Risk Assessment (FHS-7)   Do you have a family history of breast, colon, or ovarian cancer? No / Unknown       Mammogram Screening: Recommended annual mammography  Pertinent mammograms are  reviewed under the imaging tab.    History of abnormal Pap smear: NO - age 30-65 PAP every 5 years with negative HPV co-testing recommended      Latest Ref Rng & Units 3/17/2023     9:09 AM 2018     4:42 PM 2018     4:40 PM   PAP / HPV   PAP  Negative for Intraepithelial Lesion or Malignancy (NILM)      PAP (Historical)   NIL     HPV 16 DNA Negative Negative   Negative    HPV 18 DNA Negative Negative   Negative    Other HR HPV Negative Negative   Negative      Reviewed and updated as needed this visit by clinical staff   Tobacco  Allergies  Meds              Reviewed and updated as needed this visit by Provider                 History reviewed. No pertinent past medical history.   Past Surgical History:   Procedure Laterality Date    COMBINED ESOPHAGOSCOPY, GASTROSCOPY, DUODENOSCOPY (EGD) WITH CO2 INSUFFLATION N/A 2023    Procedure: Combined Esophagoscopy, Gastroscopy, Duodenoscopy (Egd) With Co2 Insufflation;  Surgeon: Naomi Bray DO;  [facility]    DILATION AND CURETTAGE SUCTION N/A 2018    Procedure: DILATION AND CURETTAGE SUCTION;  Suction D & C;  Surgeon: Edmond Garvin MD;  [facility]    ESOPHAGOSCOPY, GASTROSCOPY, DUODENOSCOPY (EGD), COMBINED N/A 2023    Procedure: ESOPHAGOGASTRODUODENOSCOPY, WITH BIOPSY;  Surgeon: Naomi Bray DO;  Location: [facility]     OB History    Para Term  AB Living   5 3 3 0 2 3   SAB IAB Ectopic Multiple Live Births   2 0 0 0 0      # Outcome Date GA Lbr Ivan/2nd Weight Sex Delivery Anes PTL Lv   5 SAB 19           4 SAB 18 11w1d    AB, MISSED Local        Complications: History of D&C   3 Term 10/21/14   3.005 kg (6 lb 10 oz) F       2 Term 13   3.884 kg (8 lb 9 oz) M   N    1 Term 07   3.459 kg (7 lb 10 oz) M           Review of Systems   Constitutional:  Negative for chills and fever.   HENT:  Negative for congestion, ear pain, hearing loss and sore throat.    Eyes:  Positive for visual  "disturbance. Negative for pain.   Respiratory:  Negative for cough and shortness of breath.    Cardiovascular:  Negative for chest pain, palpitations and peripheral edema.   Gastrointestinal:  Positive for heartburn. Negative for abdominal pain, constipation, diarrhea, hematochezia and nausea.   Breasts:  Negative for tenderness, breast mass and discharge.   Genitourinary:  Negative for dysuria, frequency, genital sores, hematuria, pelvic pain, urgency, vaginal bleeding and vaginal discharge.   Musculoskeletal:  Positive for myalgias. Negative for arthralgias and joint swelling.   Skin:  Negative for rash.   Neurological:  Negative for dizziness, weakness, headaches and paresthesias.   Psychiatric/Behavioral:  Negative for mood changes. The patient is not nervous/anxious.           OBJECTIVE:   BP (!) 126/92   Pulse 80   Temp 97.2  F (36.2  C) (Temporal)   Resp 20   Ht 1.51 m (4' 11.45\")   Wt 68.6 kg (151 lb 3.2 oz)   LMP 07/10/2023 (Exact Date)   SpO2 98%   BMI 30.08 kg/m    Physical Exam  GENERAL: healthy, alert and no distress  EYES: Eyes grossly normal to inspection, PERRL and conjunctivae and sclerae normal  HENT: ear canals and TM's normal, nose and mouth without ulcers or lesions  NECK: no adenopathy, no asymmetry, masses, or scars and thyroid normal to palpation  RESP: lungs clear to auscultation - no rales, rhonchi or wheezes  BREAST: deferred per guidelines- asymptomatic per pt  CV: regular rate and rhythm, normal S1 S2, no S3 or S4, no murmur, click or rub, no peripheral edema and peripheral pulses strong  ABDOMEN: soft, nontender, no hepatosplenomegaly, no masses and bowel sounds normal   (female): deferred per pt  MS: no gross musculoskeletal defects noted, no edema, no CVA tenderness. POSITIVE for pain with palpation of L heel; no pain with dependent ROM of left foot/ ankle. No pain with palpation of R wrist nor with dependent ROM. No edema or discoloration seen.   SKIN: no suspicious lesions " or rashes  NEURO: Normal strength and tone, cranial nerves intact, mentation intact and speech normal  PSYCH: mentation appears normal, affect normal/bright  LYMPH: no cervical, supraclavicular adenopathy    Diagnostic Test Results:  Labs reviewed in Epic  See orders    ASSESSMENT/PLAN:       ICD-10-CM    1. Routine general medical examination at a health care facility  Z00.00       2. Screen for colon cancer  Z12.11       3. Pain of left heel  M79.672 XR Calcaneus Left G/E 2 Views      4. Hypothyroidism, unspecified type  E03.9 levothyroxine (SYNTHROID/LEVOTHROID) 75 MCG tablet      5. Acquired hypothyroidism  E03.9 TSH with free T4 reflex     TSH with free T4 reflex      6. Screening for diabetes mellitus  Z13.1 Renal panel (Alb, BUN, Ca, Cl, CO2, Creat, Gluc, Phos, K, Na)     Renal panel (Alb, BUN, Ca, Cl, CO2, Creat, Gluc, Phos, K, Na)      7. Laboratory examination ordered as part of a routine general medical examination  Z00.00 Renal panel (Alb, BUN, Ca, Cl, CO2, Creat, Gluc, Phos, K, Na)     Albumin Random Urine Quantitative with Creat Ratio     Renal panel (Alb, BUN, Ca, Cl, CO2, Creat, Gluc, Phos, K, Na)     Albumin Random Urine Quantitative with Creat Ratio      8. Chronic bilateral thoracic back pain  M54.6 tiZANidine (ZANAFLEX) 4 MG tablet    G89.29       9. Gastroesophageal reflux disease with esophagitis without hemorrhage  K21.00 omeprazole (PRILOSEC) 20 MG DR capsule      10. Carpal tunnel syndrome of right wrist  G56.01 diclofenac (VOLTAREN) 1 % topical gel     Physical Therapy Referral      11. Heel spur, left  M77.32 diclofenac (VOLTAREN) 1 % topical gel      12. Lipid screening  Z13.220 Lipid panel reflex to direct LDL Fasting     Lipid panel reflex to direct LDL Fasting      13. Elevated blood pressure reading without diagnosis of hypertension  R03.0 hydrochlorothiazide (HYDRODIURIL) 12.5 MG tablet          Patient has been advised of split billing requirements and indicates understanding:  "Yes      COUNSELING:  Reviewed preventive health counseling, as reflected in patient instructions  Review below tips. If carpal tunnel pain is not improving with brace, voltaren gel, stretches/ exercises, schedule with physical therapy. Your heel xray shows that you actually have 2 small heel spurs (bony growths). These are not cancerous.  Wear well supportive shoes with heel insert for heel spur/ plantar fasciitis. If heel pain persist with wearing good shoes with walking at all times and voltaren gel we will need to place referral for podiatry- then more than likely you would need heel surgery- review below information. Let me know if that is the case.     BMI:   Estimated body mass index is 30.08 kg/m  as calculated from the following:    Height as of this encounter: 1.51 m (4' 11.45\").    Weight as of this encounter: 68.6 kg (151 lb 3.2 oz).   Weight management plan: Discussed healthy diet and exercise guidelines patient reports she walks regularly, runs on tread mill. Discussed doing exercise that is less traumatic on heel- biking, rowing, etc.       She reports that she has never smoked. She has never used smokeless tobacco.          EDITH LÓPEZ  [facility]  "

## 2023-07-28 NOTE — PATIENT INSTRUCTIONS
Review below tips. If carpal tunnel pain is not improving with brace, voltaren gel, stretches/ exercises, schedule with physical therapy. Your heel xray shows that you actually have 2 small heel spurs (bony growths). These are not cancerous.  Wear well supportive shoes with heel insert for heel spur/ plantar fasciitis. If heel pain persist with wearing good shoes with walking at all times and voltaren gel we will need to place referral for podiatry- then more than likely you would need heel surgery- review below information. Let me know if that is the case.     Preventive Health Recommendations  Female Ages 50 - 64    Yearly exam: See your health care provider every year in order to  Review health changes.   Discuss preventive care.    Review your medicines if your doctor has prescribed any.    Get a Pap test every three years (unless you have an abnormal result and your provider advises testing more often).  If you get Pap tests with HPV test, you only need to test every 5 years, unless you have an abnormal result.   You do not need a Pap test if your uterus was removed (hysterectomy) and you have not had cancer.  You should be tested each year for STDs (sexually transmitted diseases) if you're at risk.   Have a mammogram every 1 to 2 years.  Have a colonoscopy at age 50, or have a yearly FIT test (stool test). These exams screen for colon cancer.    Have a cholesterol test every 5 years, or more often if advised.  Have a diabetes test (fasting glucose) every three years. If you are at risk for diabetes, you should have this test more often.   If you are at risk for osteoporosis (brittle bone disease), think about having a bone density scan (DEXA).    Shots: Get a flu shot each year. Get a tetanus shot every 10 years.    Nutrition:   Eat at least 5 servings of fruits and vegetables each day.  Eat whole-grain bread, whole-wheat pasta and brown rice instead of white grains and rice.  Get adequate Calcium and Vitamin  D.     Lifestyle  Exercise at least 150 minutes a week (30 minutes a day, 5 days a week). This will help you control your weight and prevent disease.  Limit alcohol to one drink per day.  No smoking.   Wear sunscreen to prevent skin cancer.   See your dentist every six months for an exam and cleaning.  See your eye doctor every 1 to 2 years.

## 2023-07-29 LAB
ALBUMIN SERPL BCG-MCNC: 4.4 G/DL (ref 3.5–5.2)
ANION GAP SERPL CALCULATED.3IONS-SCNC: 8 MMOL/L (ref 7–15)
BUN SERPL-MCNC: 17.5 MG/DL (ref 6–20)
CALCIUM SERPL-MCNC: 9.4 MG/DL (ref 8.6–10)
CHLORIDE SERPL-SCNC: 104 MMOL/L (ref 98–107)
CHOLEST SERPL-MCNC: 173 MG/DL
CREAT SERPL-MCNC: 0.71 MG/DL (ref 0.51–0.95)
CREAT UR-MCNC: 95.7 MG/DL
DEPRECATED HCO3 PLAS-SCNC: 24 MMOL/L (ref 22–29)
GFR SERPL CREATININE-BSD FRML MDRD: >90 ML/MIN/1.73M2
GLUCOSE SERPL-MCNC: 83 MG/DL (ref 70–99)
HDLC SERPL-MCNC: 47 MG/DL
LDLC SERPL CALC-MCNC: 97 MG/DL
MICROALBUMIN UR-MCNC: <12 MG/L
MICROALBUMIN/CREAT UR: NORMAL MG/G{CREAT}
NONHDLC SERPL-MCNC: 126 MG/DL
PHOSPHATE SERPL-MCNC: 3.6 MG/DL (ref 2.5–4.5)
POTASSIUM SERPL-SCNC: 4 MMOL/L (ref 3.4–5.3)
SODIUM SERPL-SCNC: 136 MMOL/L (ref 136–145)
TRIGL SERPL-MCNC: 145 MG/DL
TSH SERPL DL<=0.005 MIU/L-ACNC: 1.73 UIU/ML (ref 0.3–4.2)

## 2023-07-31 DIAGNOSIS — R03.0 ELEVATED BLOOD PRESSURE READING WITHOUT DIAGNOSIS OF HYPERTENSION: ICD-10-CM

## 2023-07-31 DIAGNOSIS — G56.01 CARPAL TUNNEL SYNDROME OF RIGHT WRIST: Primary | ICD-10-CM

## 2023-07-31 RX ORDER — HYDROCHLOROTHIAZIDE 12.5 MG/1
12.5 TABLET ORAL DAILY
Qty: 90 TABLET | Refills: 1 | OUTPATIENT
Start: 2023-07-31

## 2023-07-31 NOTE — RESULT ENCOUNTER NOTE
Ricardo Urias,    Thank you for your recent office visit.    Here are your recent results. Normal thyroid labs, normal kidney function.  Normal glucose, you are not diabetic. Normal cholesterol for a non-diabetic.     Feel free to contact me via [employer] or call the clinic at [phone].    Sincerely,    CORINNE Anand, FNP-BC

## 2023-08-02 ENCOUNTER — OFFICE VISIT (OUTPATIENT)
Dept: URGENT CARE | Facility: URGENT CARE | Age: 48
End: 2023-08-02
Payer: COMMERCIAL

## 2023-08-02 ENCOUNTER — NURSE TRIAGE (OUTPATIENT)
Dept: NURSING | Facility: CLINIC | Age: 48
End: 2023-08-02
Payer: COMMERCIAL

## 2023-08-02 VITALS
BODY MASS INDEX: 30.36 KG/M2 | WEIGHT: 152.6 LBS | HEART RATE: 86 BPM | OXYGEN SATURATION: 100 % | SYSTOLIC BLOOD PRESSURE: 128 MMHG | DIASTOLIC BLOOD PRESSURE: 87 MMHG | TEMPERATURE: 98.1 F

## 2023-08-02 DIAGNOSIS — T50.Z95A ADVERSE EFFECT OF VACCINE, INITIAL ENCOUNTER: Primary | ICD-10-CM

## 2023-08-02 PROCEDURE — 99213 OFFICE O/P EST LOW 20 MIN: CPT | Performed by: PHYSICIAN ASSISTANT

## 2023-08-02 RX ORDER — CETIRIZINE HYDROCHLORIDE 10 MG/1
20 TABLET ORAL DAILY
Qty: 30 TABLET | Refills: 0 | Status: SHIPPED | OUTPATIENT
Start: 2023-08-02 | End: 2024-08-27

## 2023-08-02 NOTE — PATIENT INSTRUCTIONS
You can take over the counter antihistamines like Allegra, Claritin, Zyrtec or Xyzal for the itching. Take 2-3x as many pills as the bottle says to take. So if it says take one pill once a day, take 2-3 pills once a day. If it says take 1 pill twice a day, take 2-3 pills twice a day.    Cold packs

## 2023-08-02 NOTE — PROGRESS NOTES
Chief Complaint   Patient presents with    Allergic Reaction     PT RECEIVED 2 SHOTS IN RIGHT ARM LAST FRIDAY , AREA IS RED AND HOT (BURNING) TO THE TOUCH, A LITTLE PAIN TO THE TOUCH, ITCHY, POSSIBLE ALLERGIC REACTION       ASSESSMENT/PLAN:  Bridgett was seen today for allergic reaction.    Diagnoses and all orders for this visit:    Adverse effect of vaccine, initial encounter  -     cetirizine (ZYRTEC) 10 MG tablet; Take 2 tablets (20 mg) by mouth daily    Hypersensitivity reaction secondary to vaccines.  Does not seem consistent with cellulitis.  Start on Zyrtec above and continue cold compresses.  Follow-up if not improved in the next 3 to 4 days    Dioni Portillo PA-C      SUBJECTIVE:  Bridgett is a 48 year old female who presents to urgent care with questions about a possible allergic reaction after receiving immunization.  Received the tetanus and pneumococcal shot 7/28/2023, 4 days ago in her right shoulder and has now developed pain, redness and burning over the injection site.  Still little bit tender.  No shortness of breath, wheezing, difficulty swallowing, hoarseness, lightheadedness or dizziness.  No nausea or vomiting.  She initially had some fevers, chills and body aches but these resolved.  It is more itchy than painful  ROS: Pertinent ROS neg other than the symptoms noted above in the HPI.     OBJECTIVE:  /87 (BP Location: Left arm, Patient Position: Sitting, Cuff Size: Adult Regular)   Pulse 86   Temp 98.1  F (36.7  C) (Oral)   Wt 69.2 kg (152 lb 9.6 oz)   LMP 07/10/2023 (Exact Date)   SpO2 100%   BMI 30.36 kg/m     GENERAL: healthy, alert and no distress  MS: no gross musculoskeletal defects noted, mild nonpitting edema of the right upper extremity  SKIN: Patchy erythema over the right deltoid and bicep at    DIAGNOSTICS    No results found for any visits on 08/02/23.     Current Outpatient Medications   Medication    diclofenac (VOLTAREN) 1 % topical gel    hydrochlorothiazide  (HYDRODIURIL) 12.5 MG tablet    levothyroxine (SYNTHROID/LEVOTHROID) 75 MCG tablet    omeprazole (PRILOSEC) 20 MG DR capsule    tiZANidine (ZANAFLEX) 4 MG tablet     No current facility-administered medications for this visit.      Patient Active Problem List   Diagnosis    Hypothyroidism, unspecified type    Supervision of high-risk pregnancy of elderly multigravida    Cold sore    Hepatitis B, chronic (H)    Hepatitis C, chronic (H)    Threatened     History of miscarriage    Recurrent UTI    Missed period    Chronic bilateral thoracic back pain    Gastroesophageal reflux disease with esophagitis without hemorrhage      No past medical history on file.  Past Surgical History:   Procedure Laterality Date    COMBINED ESOPHAGOSCOPY, GASTROSCOPY, DUODENOSCOPY (EGD) WITH CO2 INSUFFLATION N/A 2023    Procedure: Combined Esophagoscopy, Gastroscopy, Duodenoscopy (Egd) With Co2 Insufflation;  Surgeon: Naomi Bray DO;  Location: [facility]    DILATION AND CURETTAGE SUCTION N/A 2018    Procedure: DILATION AND CURETTAGE SUCTION;  Suction D & C;  Surgeon: Edmond Garvin MD;  Location: [facility]    ESOPHAGOSCOPY, GASTROSCOPY, DUODENOSCOPY (EGD), COMBINED N/A 2023    Procedure: ESOPHAGOGASTRODUODENOSCOPY, WITH BIOPSY;  Surgeon: Naomi Bray DO;  Location: [facility]     No family history on file.  Social History     Tobacco Use    Smoking status: Never    Smokeless tobacco: Never   Substance Use Topics    Alcohol use: No              The plan of care was discussed with the patient. They understand and agree with the course of treatment prescribed. A printed summary was given including instructions and medications.  The use of Dragon/iJoule dictation services may have been used to construct the content in this note; any grammatical or spelling errors are non-intentional. Please contact the author of this note directly if you are in need of any clarification.

## 2023-08-02 NOTE — LETTER
August 2, 2023      Bridgett Hardin  [address] [address]        To Whom It May Concern:    Bridgett Hardin was seen in our clinic. Excuse her from work today and [facility]      Sincerely,        Dioni Portillo PA-C

## 2023-08-02 NOTE — TELEPHONE ENCOUNTER
"Patient states that last Friday was seen by America Andre NP for reflux disease.  Patient was given covid 19 vaccine,  Patient states covid 19 Bivalent was given in the left arm and two other vaccines Pneumococcal and TDAP  in the right arm.  Today the right arm is swollen which started on Tuesday and arm is \"hot\".  Patient is using ice.  Arm is red and is going down to elbow.  Arm currently is itching and hot.  Symptoms are lasting more than 2 days.  Patient states that she will go to urgent care [place] this am.        Reason for Disposition   Sounds like a severe, unusual reaction to the triager    Additional Information   Negative: Difficulty breathing or swallowing starts within 2 hours after injection   Negative: Difficult to awaken or acting confused (e.g., disoriented, slurred speech)   Negative: Unresponsive, passed out, or very weak   Negative: Sounds like a life-threatening emergency to the triager   Negative: Fever > 104 F (40 C)   Negative: Measles vaccine rash (onset day 6-12) and purple or blood-colored    Protocols used: Immunization Sbdidkole-O-DJ    "

## 2023-08-14 DIAGNOSIS — R03.0 ELEVATED BLOOD PRESSURE READING WITHOUT DIAGNOSIS OF HYPERTENSION: ICD-10-CM

## 2023-08-14 RX ORDER — HYDROCHLOROTHIAZIDE 12.5 MG/1
12.5 TABLET ORAL DAILY
Qty: 90 TABLET | Refills: 1 | OUTPATIENT
Start: 2023-08-14

## 2023-08-21 ENCOUNTER — TELEPHONE (OUTPATIENT)
Dept: FAMILY MEDICINE | Facility: CLINIC | Age: 48
End: 2023-08-21
Payer: COMMERCIAL

## 2023-08-21 DIAGNOSIS — R03.0 ELEVATED BLOOD PRESSURE READING WITHOUT DIAGNOSIS OF HYPERTENSION: ICD-10-CM

## 2023-08-21 NOTE — TELEPHONE ENCOUNTER
"Received call from Benita with [facility], calling regarding: hydrochlorothiazide (HYDRODIURIL) 12.5 MG tablet     She was looking for clarification on below directions attached to rx:    \"Sig - Route: Take 1 tablet (12.5 mg) by mouth daily Is for 5 minutes and check blood pressure. Lt me know if BP not < 140/80 at rest - Oral \"    Please clarify.    Callback #: [phone]    ARGENTINA PerezN AUDREY  [facility]  "

## 2023-08-23 RX ORDER — HYDROCHLOROTHIAZIDE 12.5 MG/1
12.5 TABLET ORAL DAILY
Qty: 90 TABLET | Refills: 1 | Status: SHIPPED | OUTPATIENT
Start: 2023-08-23 | End: 2023-10-01

## 2023-09-11 ENCOUNTER — THERAPY VISIT (OUTPATIENT)
Dept: OCCUPATIONAL THERAPY | Facility: CLINIC | Age: 48
End: 2023-09-11
Attending: NURSE PRACTITIONER
Payer: OTHER MISCELLANEOUS

## 2023-09-11 DIAGNOSIS — M25.531 RIGHT WRIST PAIN: Primary | ICD-10-CM

## 2023-09-11 DIAGNOSIS — G56.01 CARPAL TUNNEL SYNDROME OF RIGHT WRIST: ICD-10-CM

## 2023-09-11 PROCEDURE — 97535 SELF CARE MNGMENT TRAINING: CPT | Mod: GO | Performed by: OCCUPATIONAL THERAPIST

## 2023-09-11 PROCEDURE — 97110 THERAPEUTIC EXERCISES: CPT | Mod: GO | Performed by: OCCUPATIONAL THERAPIST

## 2023-09-11 PROCEDURE — 97760 ORTHOTIC MGMT&TRAING 1ST ENC: CPT | Mod: GO | Performed by: OCCUPATIONAL THERAPIST

## 2023-09-11 PROCEDURE — 97165 OT EVAL LOW COMPLEX 30 MIN: CPT | Mod: GO | Performed by: OCCUPATIONAL THERAPIST

## 2023-09-11 NOTE — PROGRESS NOTES
OCCUPATIONAL THERAPY EVALUATION  Type of Visit: Evaluation    See electronic medical record for Abuse and Falls Screening details.    Subjective      Presenting condition or subjective complaint: hand wrist pain  Date of onset: 07/31/23 (therapy referral)    Relevant medical history: Dizziness   Dates & types of surgery: none per pt report    Prior diagnostic imaging/testing results:       Prior therapy history for the same diagnosis, illness or injury: No      Prior Level of Function  ADL: Independent    Living Environment  Social support: With a significant other or spouse   Type of home: House; 2-story   Stairs to enter the home: Yes       Ramp: no  Help at home: None    Employment: Yes [employer]  Hobbies/Interests: walking     Objective   ADDITIONAL HISTORY:  Right hand dominant  Patient reports symptoms of pain, edema, numbness, and tingling  of the right hand and wrist with insidious onset over the past 2 months  Transportation: drives  Currently working in normal job without restrictions    Functional Outcome Measure:   Functional Outcome Measure:  Upper Extremity Functional Index  SCORE:   Column Totals: 32/80  (A lower score indicates greater disability.)    Pain Level (Scale 0-10)   9/11/2023   At Rest 0   With Use 8-9     Pain Description  Date 9/11/2023   Location Dorsal wrist and into extensor wad   Pain Quality Aching and Sharp   Frequency intermittent     Pain is worst  daytime > nighttime   Exacerbated by  Repetitive use   Relieved by cold and wrist wrap   Progression Unchanged      Posture  Forward Neck Posture and Rounded Forward Shoulders    Edema  Mild in thumb/thenar eminence     Sensation  Decreased intermittently in thumb and long finger (Median Nerve distribution) per pt report    ROM   WNL wrist, thumb and fingers, pain with all planes. No thenar or intrinsic atrophy noted on exam.    Special Tests   - none    + mild    ++ moderate    +++ severe         9/11/2023   Carlos Manuel Test  NT   Elbow Flexion Test -   Tinels Cubital Tunnel -   Tinels Guyons Canal -   Median Nerve Compression at Pronator -   Carpal-Compression Test + slight thenars   Rojas test for lumbrical incursion  (fist x 30 secs) -   Tinels at Carpal Tunnel + slight   Phalens -   WHAT Test +   Finkelsteins +   CMC Passive Retroposition  +     Neural Tension Testing  MNT: Median Neurodynamic Test (based on DS Lorena's ULNT)   9/11/2023   0-5 Scale 4+/5   0/5: Arm across abdomen in coronal plane  1/5: Depress shoulder, ER to neutral Abd shoulder to 45 degrees  2/5: ER shoulder to end range, keep elbow at 90 degrees  3/5: Extend elbow to 0 degrees  4/5: Fully supinate forearm  5/5: Extend wrist, fingers and thumb  Notes:  (+) indicates beyond grade level but less than senior care to next level  (-) indicates over senior care to level  S1 onset/change of patient's symptoms  S2 definite stop point based on patient's discomfort level    Resisted Testing  Pain Report:  - none    + mild    ++ moderate    +++ severe    9/11/2023   Elbow Extension -   Elbow Flexion -   Supination  + extensors   Pronation +   Wrist Ext with RD, Elbow Ext -   Wrist Ext with UD, Elbow Ext -   Wrist Flex with RD, Elbow Ext -   Wrist Flex with UD, Elbow Ext -   EDC with Elbow Ext + dorsal wrist   Long Finger Test + wrist and ext   FDS + index   EPB ++ 1st dc   APL + 1st dc     Strength    Unable to assess, tools out for calibration     Palpation  Pain Report:  - none    + mild    ++ moderate    +++ severe    9/11/2023   LEP +   Extensor Wad +   PIN Site +   1st DC ++   Thumb CMC Joint + slight   Thenars  -   MEP -   Flexor Wad + slight     Assessment & Plan   CLINICAL IMPRESSIONS  Medical Diagnosis: Right wrist CTS    Treatment Diagnosis: Right wrist and thumb pain    Impression/Assessment: Pt is a 48 year old female presenting to Occupational Therapy due to right wrist and thumb pain.  Patient's limitations or Problem List includes: Pain, Decreased ROM/motion,  Weakness, Sensory disturbance, Decreased , Decreased pinch, and Tightness in musculature of the right wrist and thumb which interferes with the patient's ability to perform Self Care Tasks (dressing, eating, bathing), Work Tasks, Sleep Patterns, Recreational Activities, Household Chores, and Driving  as compared to previous level of function.    Clinical Decision Making (Complexity):  Assessment of Occupational Performance: 5 or more Performance Deficits  Occupational Performance Limitations: bathing/showering, dressing, hygiene and grooming, driving and community mobility, home establishment and management, meal preparation and cleanup, sleep, work, and leisure activities  Clinical Decision Making (Complexity): Low complexity    PLAN OF CARE  Treatment Interventions:  Modalities:  US  Therapeutic Exercise:  AROM, PROM, Tendon Gliding, Isotonics, Isometrics, and Stabilization  Neuromuscular re-education:  Nerve Gliding, Posture, and Kinesiotaping  Manual Techniques:  Friction massage and Myofascial release  Orthotic Fabrication:  Static Forearm based  Self Care:  Self Care Tasks and Ergonomic Considerations    Long Term Goals   OT Goal 1  Goal Identifier: work  Goal Description: Pt will report decreased pain to be able to perform repetitive work tasks with mild to no difficulty; pain 3/10 or less  Rationale:  (for safety and independence with ADL's and pain free work tasks)  Goal Progress: pain 9/10  Target Date: 11/09/23      Frequency of Treatment: 1 x per week  Duration of Treatment: 8 weeks     Recommended Referrals to Other Professionals:  none  Education Assessment: Learner/Method: Patient;Demonstration;Pictures/Video  Education Comments: PTRx set up on phone     Risks and benefits of evaluation/treatment have been explained.   Patient/Family/caregiver agrees with Plan of Care.     Evaluation Time:    OT Eval, Low Complexity Minutes (93156): 20   Present: Not applicable     Signing Clinician:  QUINTIN Mandujano [facility]                                                                                   OUTPATIENT OCCUPATIONAL THERAPY      PLAN OF TREATMENT FOR OUTPATIENT REHABILITATION   Patient's Last Name, First Name, Bridgett Masterson YOB: 1975   Provider's Name   [facility]   Medical Record No.  [id number]     Onset Date: 07/31/23 (therapy referral) Start of Care Date: 09/11/23     Medical Diagnosis:  Right wrist CTS      OT Treatment Diagnosis:  Right wrist and thumb pain Plan of Treatment  Frequency/Duration:1 x per week/8 weeks    Certification date from 09/11/23   To 11/09/23        See note for plan of treatment details and functional goals     Jen Villaseñor OT                         I CERTIFY THE NEED FOR THESE SERVICES FURNISHED UNDER        THIS PLAN OF TREATMENT AND WHILE UNDER MY CARE     (Physician attestation of this document indicates review and certification of the therapy plan).                Referring Provider:  America Alvarez      Initial Assessment  See Epic Evaluation- 09/11/23

## 2023-09-18 DIAGNOSIS — M79.672 PAIN OF LEFT HEEL: Primary | ICD-10-CM

## 2023-09-25 ENCOUNTER — NURSE TRIAGE (OUTPATIENT)
Dept: FAMILY MEDICINE | Facility: CLINIC | Age: 48
End: 2023-09-25

## 2023-09-25 ENCOUNTER — THERAPY VISIT (OUTPATIENT)
Dept: OCCUPATIONAL THERAPY | Facility: CLINIC | Age: 48
End: 2023-09-25
Payer: OTHER MISCELLANEOUS

## 2023-09-25 ENCOUNTER — OFFICE VISIT (OUTPATIENT)
Dept: FAMILY MEDICINE | Facility: CLINIC | Age: 48
End: 2023-09-25
Payer: COMMERCIAL

## 2023-09-25 VITALS
OXYGEN SATURATION: 100 % | WEIGHT: 153 LBS | SYSTOLIC BLOOD PRESSURE: 126 MMHG | DIASTOLIC BLOOD PRESSURE: 80 MMHG | TEMPERATURE: 98 F | HEIGHT: 59 IN | HEART RATE: 63 BPM | BODY MASS INDEX: 30.84 KG/M2

## 2023-09-25 DIAGNOSIS — M25.531 RIGHT WRIST PAIN: ICD-10-CM

## 2023-09-25 DIAGNOSIS — G56.01 CARPAL TUNNEL SYNDROME OF RIGHT WRIST: Primary | ICD-10-CM

## 2023-09-25 DIAGNOSIS — H81.10 BENIGN PAROXYSMAL POSITIONAL VERTIGO, UNSPECIFIED LATERALITY: Primary | ICD-10-CM

## 2023-09-25 LAB — HGB BLD-MCNC: 11.1 G/DL (ref 11.7–15.7)

## 2023-09-25 PROCEDURE — 97035 APP MDLTY 1+ULTRASOUND EA 15: CPT | Mod: GO | Performed by: OCCUPATIONAL THERAPIST

## 2023-09-25 PROCEDURE — 85018 HEMOGLOBIN: CPT | Performed by: PHYSICIAN ASSISTANT

## 2023-09-25 PROCEDURE — 93000 ELECTROCARDIOGRAM COMPLETE: CPT | Performed by: PHYSICIAN ASSISTANT

## 2023-09-25 PROCEDURE — 99214 OFFICE O/P EST MOD 30 MIN: CPT | Mod: 25 | Performed by: PHYSICIAN ASSISTANT

## 2023-09-25 PROCEDURE — 80048 BASIC METABOLIC PNL TOTAL CA: CPT | Performed by: PHYSICIAN ASSISTANT

## 2023-09-25 PROCEDURE — 97140 MANUAL THERAPY 1/> REGIONS: CPT | Mod: GO | Performed by: OCCUPATIONAL THERAPIST

## 2023-09-25 PROCEDURE — 90682 RIV4 VACC RECOMBINANT DNA IM: CPT | Performed by: PHYSICIAN ASSISTANT

## 2023-09-25 PROCEDURE — 90471 IMMUNIZATION ADMIN: CPT | Performed by: PHYSICIAN ASSISTANT

## 2023-09-25 PROCEDURE — 36415 COLL VENOUS BLD VENIPUNCTURE: CPT | Performed by: PHYSICIAN ASSISTANT

## 2023-09-25 PROCEDURE — 97110 THERAPEUTIC EXERCISES: CPT | Mod: GO | Performed by: OCCUPATIONAL THERAPIST

## 2023-09-25 RX ORDER — MECLIZINE HYDROCHLORIDE 25 MG/1
25 TABLET ORAL 3 TIMES DAILY PRN
Qty: 30 TABLET | Refills: 0 | Status: SHIPPED | OUTPATIENT
Start: 2023-09-25 | End: 2024-08-27

## 2023-09-25 ASSESSMENT — ENCOUNTER SYMPTOMS: DIZZINESS: 1

## 2023-09-25 NOTE — PROGRESS NOTES
Assessment & Plan   Problem List Items Addressed This Visit    None  Visit Diagnoses       Benign paroxysmal positional vertigo, unspecified laterality    -  Primary    Relevant Medications    meclizine (ANTIVERT) 25 MG tablet    Other Relevant Orders    Physical Therapy Referral    EKG 12-lead complete w/read - Clinics (Completed)    Hemoglobin (Completed)    Basic metabolic panel  (Ca, Cl, CO2, Creat, Gluc, K, Na, BUN) (Completed)           Dizziness from unknown etiology, but most consistent with BPPV given it is intermittent and worse with head movement. No anemia or worrisome arrythmia/ electrolyte abnormality, or other worrisome process. Exam benign. Declined Tavo Hallpike maneuver. Will treat with Epley, psuhing of fluids, meclizine and follow up if not improving in next week to consider furhter workup such as neuro imaging.     Complete history and physical exam as below. Afebrile with normal vital signs.    DDx and Dx discussed with and explained to the pt to their satisfaction.  All questions were answered at this time. Pt expressed understanding of and agreement with this dx, tx, and plan. No further workup warranted and standard medication warnings given. I have given the patient a list of pertinent indications for re-evaluation. Will go to the Emergency Department if symptoms worsen or new concerning symptoms arise. Patient left in no apparent distress.    Ordering of each unique test  Prescription drug management  31 minutes spent by me on the date of the encounter doing chart review, history and exam, documentation and further activities per the note     See Patient Instructions    JACQUELYN Healy  [facility] JOHN Urias is a 48 year old, presenting for the following health issues:    Dizziness        9/25/2023     4:14 PM   Additional Questions   Roomed by Morena SENIOR         9/25/2023     4:14 PM   Patient Reported Additional Medications   Patient reports  "taking the following new medications No new medications to add       History of Present Illness       Reason for visit:  Having dizziness  Symptom onset:  3-4 weeks ago  Symptoms include:  Happened when laydown on bed,get up from bed or tuning my head.  Symptom intensity:  Severe  Symptom progression:  Staying the same  Had these symptoms before:  No  What makes it worse:  N/a  What makes it better:  None    She eats 2-3 servings of fruits and vegetables daily.She consumes 0 sweetened beverage(s) daily.She exercises with enough effort to increase her heart rate 10 to 19 minutes per day.  She exercises with enough effort to increase her heart rate 3 or less days per week.   She is taking medications regularly.     Head movement causes dizziness to worsen. Movement type dizziness. No feeling of being faint. Vision seems baseline. No numbness/tingling. Sitting still makes it better. Intermittent. No falls. No new medications. Eating and drinking well. Morning sickness medicine helped her while in [place]. Here with daughter. They declined .    Review of Systems   Neurological:  Positive for dizziness.      Constitutional, HEENT, cardiovascular, pulmonary, gi and gu systems are negative, except as otherwise noted.      Objective    /80 (BP Location: Right arm, Patient Position: Chair, Cuff Size: Adult Regular)   Pulse 63   Temp 98  F (36.7  C) (Tympanic)   Ht 1.51 m (4' 11.45\")   Wt 69.4 kg (153 lb)   LMP 07/10/2023 (Exact Date)   SpO2 100%   BMI 30.44 kg/m    Body mass index is 30.44 kg/m .  Physical Exam  Vitals and nursing note reviewed.   Constitutional:       General: She is not in acute distress.     Appearance: She is not ill-appearing or diaphoretic.   HENT:      Head: Normocephalic and atraumatic.      Mouth/Throat:      Mouth: Mucous membranes are moist.   Eyes:      Conjunctiva/sclera: Conjunctivae normal.   Cardiovascular:      Rate and Rhythm: Normal rate and regular rhythm.      " Heart sounds: Normal heart sounds. No murmur heard.     No friction rub. No gallop.   Pulmonary:      Effort: Pulmonary effort is normal. No respiratory distress.      Breath sounds: Normal breath sounds. No stridor. No wheezing, rhonchi or rales.   Abdominal:      General: Bowel sounds are normal. There is no distension.      Palpations: Abdomen is soft. There is no mass.      Tenderness: There is no abdominal tenderness. There is no guarding or rebound.      Hernia: No hernia is present.   Skin:     General: Skin is warm and dry.   Neurological:      General: No focal deficit present.      Mental Status: She is alert. Mental status is at baseline.      Comments: Negative finger-nose-finger, heel-to-shin and pronator drift.  Face symmetric.  Speech clear. CN 2-12 intact. Normal strength and sensation in face and upper/lower extremities bilaterally.   Psychiatric:         Mood and Affect: Mood normal.         Behavior: Behavior normal.          EKG - Reviewed and interpreted by me sinus bradycardia, normal axis, normal intervals, no acute ST/T changes c/w ischemia, no LVH by voltage criteria, there are no prior tracings available  Results for orders placed or performed in visit on 09/25/23   Hemoglobin     Status: Abnormal   Result Value Ref Range    Hemoglobin 11.1 (L) 11.7 - 15.7 g/dL   Basic metabolic panel  (Ca, Cl, CO2, Creat, Gluc, K, Na, BUN)     Status: Abnormal   Result Value Ref Range    Sodium 137 135 - 145 mmol/L    Potassium 4.4 3.4 - 5.3 mmol/L    Chloride 105 98 - 107 mmol/L    Carbon Dioxide (CO2) 22 22 - 29 mmol/L    Anion Gap 10 7 - 15 mmol/L    Urea Nitrogen 15.3 6.0 - 20.0 mg/dL    Creatinine 0.69 0.51 - 0.95 mg/dL    GFR Estimate >90 >60 mL/min/1.73m2    Calcium 8.8 8.6 - 10.0 mg/dL    Glucose 101 (H) 70 - 99 mg/dL

## 2023-09-25 NOTE — TELEPHONE ENCOUNTER
Reason for Disposition   Patient wants to be seen    Additional Information   Negative: SEVERE difficulty breathing (e.g., struggling for each breath, speaks in single words)   Negative: Shock suspected (e.g., cold/pale/clammy skin, too weak to stand, low BP, rapid pulse)   Negative: Difficult to awaken or acting confused (e.g., disoriented, slurred speech)   Negative: Fainted, and still feels dizzy afterwards   Negative: Overdose (accidental or intentional) of medications   Negative: New neurologic deficit that is present now: * Weakness of the face, arm, or leg on one side of the body * Numbness of the face, arm, or leg on one side of the body * Loss of speech or garbled speech   Negative: Heart beating < 50 beats per minute OR > 140 beats per minute   Negative: Sounds like a life-threatening emergency to the triager   Negative: Chest pain   Negative: Rectal bleeding, bloody stool, or tarry-black stool   Negative: Vomiting is main symptom   Negative: Diarrhea is main symptom   Negative: Headache is main symptom   Negative: Heat exhaustion suspected (i.e., dehydration from heat exposure)   Negative: Patient states that they are having an anxiety or panic attack   Negative: Dizziness from low blood sugar (i.e., < 60 mg/dl or 3.5 mmol/l)   Negative: SEVERE dizziness (e.g., unable to stand, requires support to walk, feels like passing out now)   Negative: SEVERE headache or neck pain   Negative: Spinning or tilting sensation (vertigo) present now and one or more stroke risk factors (i.e., hypertension, diabetes mellitus, prior stroke/TIA, heart attack, age over 60) (Exception: Prior physician evaluation for this AND no different/worse than usual.)   Negative: Neurologic deficit that was brief (now gone), ANY of the following:* Weakness of the face, arm, or leg on one side of the body* Numbness of the face, arm, or leg on one side of the body* Loss of speech or garbled speech   Negative: Loss of vision or double vision  " (Exception: Similar to previous migraines.)   Negative: Extra heartbeats, irregular heart beating, or heart is beating very fast (i.e., 'palpitations')   Negative: Difficulty breathing   Negative: Drinking very little and dehydration suspected (e.g., no urine > 12 hours, very dry mouth, very lightheaded)   Negative: Follows bleeding (e.g., stomach, rectum, vagina)  (Exception: Became dizzy from sight of small amount blood.)   Negative: Patient sounds very sick or weak to the triager   Negative: Lightheadedness (dizziness) present now, after 2 hours of rest and fluids   Negative: Spinning or tilting sensation (vertigo) present now   Negative: Fever > 103 F (39.4 C)   Negative: Fever > 100.0 F (37.8 C) and has diabetes mellitus or a weak immune system (e.g., HIV positive, cancer chemotherapy, organ transplant, splenectomy, chronic steroids)   Negative: MODERATE dizziness (e.g., interferes with normal activities)  (Exception: Dizziness caused by heat exposure, sudden standing, or poor fluid intake.)   Negative: Vomiting occurs with dizziness    Answer Assessment - Initial Assessment Questions  1. DESCRIPTION: \"Describe your dizziness.\"      Spinning upside down feeling  2. LIGHTHEADED: \"Do you feel lightheaded?\" (e.g., somewhat faint, woozy, weak upon standing)      No to all   3. VERTIGO: \"Do you feel like either you or the room is spinning or tilting?\" (i.e. vertigo)      Yes to both   4. SEVERITY: \"How bad is it?\"  \"Do you feel like you are going to faint?\" \"Can you stand and walk?\"    - MILD: Feels slightly dizzy, but walking normally.    - MODERATE: Feels unsteady when walking, but not falling; interferes with normal activities (e.g., school, work).    - SEVERE: Unable to walk without falling, or requires assistance to walk without falling; feels like passing out now.       Mild  5. ONSET:  \"When did the dizziness begin?\"      Once in August then this current episode has lasted a couple days   6. AGGRAVATING " "FACTORS: \"Does anything make it worse?\" (e.g., standing, change in head position)      Moving her head or closing eye  7. HEART RATE: \"Can you tell me your heart rate?\" \"How many beats in 15 seconds?\"  (Note: not all patients can do this)        No, heart rate is normal   8. CAUSE: \"What do you think is causing the dizziness?\"      Not sure   9. RECURRENT SYMPTOM: \"Have you had dizziness before?\" If Yes, ask: \"When was the last time?\" \"What happened that time?\"      Yes once before but has since resolved.   10. OTHER SYMPTOMS: \"Do you have any other symptoms?\" (e.g., fever, chest pain, vomiting, diarrhea, bleeding)        No to all   11. PREGNANCY: \"Is there any chance you are pregnant?\" \"When was your last menstrual period?\"        No    Protocols used: Dizziness-A-OH    "

## 2023-09-25 NOTE — PROGRESS NOTES
Objective Information for 9/25/2023:    Pain Level (Scale 0-10)   9/11/2023 9/25/2023   At Rest 0    With Use 8-9 7     Special Tests   - none    + mild    ++ moderate    +++ severe         9/11/2023   Carlos Manuel Test NT   Elbow Flexion Test -   Tinels Cubital Tunnel -   Tinels Guyons Canal -   Median Nerve Compression at Pronator -   Carpal-Compression Test + slight thenars   Rojas test for lumbrical incursion  (fist x 30 secs) -   Tinels at Carpal Tunnel + slight   Phalens -   WHAT Test +   Finkelsteins +   CMC Passive Retroposition  +     Palpation  Pain Report:  - none    + mild    ++ moderate    +++ severe    9/11/2023 9/25/2023   LEP + + slight   Extensor Wad + + slight   PIN Site +    1st DC ++ +   Thumb CMC Joint + slight -   Thenars  - -   MEP - -   Flexor Wad + slight -

## 2023-09-25 NOTE — PATIENT INSTRUCTIONS
Treva Urias,    Thank you for allowing [facility] to manage your care.    I am unsure of the cause of your symptoms, but your exam is reassuring. We will see what our workup shows. This is most likely BPPV.    If you develop worsening/changing symptoms at any time, please call 911 or go to the emergency department for evaluation as we discussed.    I ordered some lab work. Please go to the laboratory to get your studies.    I sent your prescriptions to your pharmacy. For dizziness, please use meclizine as prescribed. Do not use this medication while driving, operating machinery, with other sedating medications, or while drinking alcohol as it will make you drowsy.    I made a referral to physical therapy. They will be calling in approximately 1 week to set up your appointment.  If you do not hear from them, please call the specialty number on your after visit summary.     Please allow 1-2 business days for our office to contact you in regards to your laboratory/radiological studies.  If not done so, I encourage you to login into [facility] [email or web address] to review your results as well.     Drink 8-10 glasses of fluid daily to stay well-hydrated.    If you have any questions or concerns, please feel free to call us at [phone]    Sincerely,    Mekhi Weiner PA-C    Did you know?      You can schedule a video visit for follow-up appointments as well as future appointments for certain conditions.  Please see the below link.     [email or web address]    If you have not already done so,  I encourage you to sign up for [employer]t ([email or web address]).  This will allow you to review your results, securely communicate with a provider, and schedule virtual visits as well.

## 2023-09-26 LAB
ANION GAP SERPL CALCULATED.3IONS-SCNC: 10 MMOL/L (ref 7–15)
BUN SERPL-MCNC: 15.3 MG/DL (ref 6–20)
CALCIUM SERPL-MCNC: 8.8 MG/DL (ref 8.6–10)
CHLORIDE SERPL-SCNC: 105 MMOL/L (ref 98–107)
CREAT SERPL-MCNC: 0.69 MG/DL (ref 0.51–0.95)
DEPRECATED HCO3 PLAS-SCNC: 22 MMOL/L (ref 22–29)
EGFRCR SERPLBLD CKD-EPI 2021: >90 ML/MIN/1.73M2
GLUCOSE SERPL-MCNC: 101 MG/DL (ref 70–99)
POTASSIUM SERPL-SCNC: 4.4 MMOL/L (ref 3.4–5.3)
SODIUM SERPL-SCNC: 137 MMOL/L (ref 135–145)

## 2023-10-06 ENCOUNTER — OFFICE VISIT (OUTPATIENT)
Dept: PODIATRY | Facility: CLINIC | Age: 48
End: 2023-10-06
Attending: NURSE PRACTITIONER
Payer: COMMERCIAL

## 2023-10-06 VITALS
DIASTOLIC BLOOD PRESSURE: 87 MMHG | SYSTOLIC BLOOD PRESSURE: 134 MMHG | HEART RATE: 72 BPM | BODY MASS INDEX: 30.84 KG/M2 | HEIGHT: 59 IN | WEIGHT: 153 LBS

## 2023-10-06 DIAGNOSIS — M72.2 PLANTAR FASCIITIS, LEFT: Primary | ICD-10-CM

## 2023-10-06 DIAGNOSIS — M79.672 PAIN OF LEFT HEEL: ICD-10-CM

## 2023-10-06 PROCEDURE — 99203 OFFICE O/P NEW LOW 30 MIN: CPT | Performed by: PODIATRIST

## 2023-10-06 RX ORDER — MELOXICAM 7.5 MG/1
7.5 TABLET ORAL DAILY
Qty: 30 TABLET | Refills: 1 | Status: SHIPPED | OUTPATIENT
Start: 2023-10-06 | End: 2024-08-27

## 2023-10-06 ASSESSMENT — PAIN SCALES - GENERAL: PAINLEVEL: WORST PAIN (10)

## 2023-10-06 NOTE — PATIENT INSTRUCTIONS

## 2023-10-06 NOTE — PROGRESS NOTES
PATIENT HISTORY:  Bridgett Hardin is a 48 year old female who presents to clinic in consultation at the request of  America Alvarez N.P. with a chief complaint of left heel pain.  The patient is seen with their .  The patient relates the pain is primarily located around the bottom radiating into the medial aspect of the heel.  Reports insidious onset without acute precipitating event. that has been going on for several  month(s).  The patient has previously tried tylenol, volteran gel, icy hot and massage with little relief.  Denies any prior history of similar issues..  The patient is currently employed as assembly .  Any previous notes and studies that pertain to the patient's condition were reviewed.    Pertinent medical, surgical and family history was reviewed in the Epic chart.    Past Medical History: No past medical history on file.    Medications:   Current Outpatient Medications:     levothyroxine (SYNTHROID/LEVOTHROID) 75 MCG tablet, TAKE 1 TABLET(75 MCG) BY MOUTH DAILY, Disp: 90 tablet, Rfl: 3    meclizine (ANTIVERT) 25 MG tablet, Take 1 tablet (25 mg) by mouth 3 times daily as needed for dizziness, Disp: 30 tablet, Rfl: 0    meloxicam (MOBIC) 7.5 MG tablet, Take 1 tablet (7.5 mg) by mouth daily, Disp: 30 tablet, Rfl: 1    cetirizine (ZYRTEC) 10 MG tablet, Take 2 tablets (20 mg) by mouth daily (Patient not taking: Reported on 10/6/2023), Disp: 30 tablet, Rfl: 0    diclofenac (VOLTAREN) 1 % topical gel, Apply 4 g topically 4 times daily (Patient not taking: Reported on 10/6/2023), Disp: 350 g, Rfl: 4    omeprazole (PRILOSEC) 20 MG DR capsule, Take 1 capsule (20 mg) by mouth daily (Patient not taking: Reported on 10/6/2023), Disp: 90 capsule, Rfl: 3    tiZANidine (ZANAFLEX) 4 MG tablet, Take 1 tablet (4 mg) by mouth 2 times daily as needed for muscle spasms (thoracic back pain) (Patient not taking: Reported on 10/6/2023), Disp: 60 tablet, Rfl: 4     Allergies:  No Known Allergies    Vitals: BP  "134/87   Pulse 72   Ht 1.51 m (4' 11.45\")   Wt 69.4 kg (153 lb)   BMI 30.44 kg/m    BMI= Body mass index is 30.44 kg/m .    LOWER EXTREMITY PHYSICAL EXAM    Dermatologic: Skin is intact to left lower extremity without significant lesions, rash or abrasion.        Vascular: DP & PT pulses are intact & regular on the left.   CFT and skin temperature is normal to the left lower extremity.     Neurologic: Lower extremity sensation is intact to light touch.  No evidence of weakness in the left lower extremity.        Musculoskeletal: Patient is ambulatory without assistive device or brace.  No gross ankle deformity noted.  No foot or ankle joint effusion is noted.  Noted pain on palpation on the plantar aspect of the left heel near the insertion point of the plantar fascia.  No surrounding erythema or edema noted.  Noted tight gastroc complex on the left.    Diagnostics:  Recent radiographs included calcaneal axial and lateral views of the left foot demonstrating calcaneal spurring with no cortical erosions or periosteal elevation.  All joint margins appear stable.  There is no apparent fracture or tumor formation noted.  There is no evidence of foreign body.  The images were independently reviewed by myself.      ASSESSMENT / PLAN:     ICD-10-CM    1. Plantar fasciitis, left  M72.2 [facility]otics and Prosthetics DME Orthotic; Foot Orthotics (functional rigid support); Bilateral     meloxicam (MOBIC) 7.5 MG tablet      2. Pain of left heel  M79.672 Orthopedic  Referral          I have explained to Bridgett and her  about the conditions.  We discussed the underlying contributing factors to the condition as well as treatment options along with expected length of recovery.  At this time, the patient was educated on the importance of offloading supportive shoes and other devices.  I demonstrated to the patient calf stretches to perform every hour daily until symptoms resolve.  After symptoms resolve, the patient " was advised to perform the stretches 3 times daily to prevent future recurrence.  The patient was instructed to perform warm soaks with Epson salt after which to also apply over-the-counter Voltaren gel to deeply massage the injured tissue.  The patient was instructed to do this on a daily basis until symptoms resolve.  The patient was fitted with a silicone heel cushion that will aid in offloading the pressure forces to the soft tissues and prevent further inflammation.  To reduce the amount of current inflammation, the patient was prescribed Mobic 7.5 mg to be taken daily with food and instructed to stop taking if any stomach irritation or swelling in extremities are noted.  The patient was referred to [facility] for custom orthotics that will aid in offloading the tension forces to the soft tissues and prevent further inflammation.  The patient may return in four weeks for reevaluation to determine if any further treatment will be needed.    Bridgett verbalized agreement with and understanding of the rational for the diagnosis and treatment plan.  All questions were answered to best of my ability and the patient's satisfaction. The patient was advised to contact the clinic with any questions that may arise after the clinic visit.      Disclaimer: This note consists of symbols derived from keyboarding, dictation and/or voice recognition software. As a result, there may be errors in the script that have gone undetected. Please consider this when interpreting information found in this chart.       CATHERINE Waters D.P.M., [email or web address].

## 2023-10-06 NOTE — NURSING NOTE
"Chief Complaint   Patient presents with    Consult     LEFT HEEL PAIN       Initial /87   Pulse 72   Ht 1.51 m (4' 11.45\")   Wt 69.4 kg (153 lb)   BMI 30.44 kg/m   Estimated body mass index is 30.44 kg/m  as calculated from the following:    Height as of this encounter: 1.51 m (4' 11.45\").    Weight as of this encounter: 69.4 kg (153 lb).  Medications and allergies reviewed.      Nicol CORONA MA    "

## 2023-10-06 NOTE — LETTER
10/6/2023         RE: Bridgett Hardin  [address] [address]        Dear Colleague,    Thank you for referring your patient, Bridgett Hardin, to the [facility] [place]. Please see a copy of my visit note below.    PATIENT HISTORY:  Bridgett Hardin is a 48 year old female who presents to clinic in consultation at the request of  America Alvarez N.P. with a chief complaint of left heel pain.  The patient is seen with their .  The patient relates the pain is primarily located around the bottom radiating into the medial aspect of the heel.  Reports insidious onset without acute precipitating event. that has been going on for several  month(s).  The patient has previously tried tylenol, volteran gel, icy hot and massage with little relief.  Denies any prior history of similar issues..  The patient is currently employed as assembly .  Any previous notes and studies that pertain to the patient's condition were reviewed.    Pertinent medical, surgical and family history was reviewed in the [facility] chart.    Past Medical History: No past medical history on file.    Medications:   Current Outpatient Medications:      levothyroxine (SYNTHROID/LEVOTHROID) 75 MCG tablet, TAKE 1 TABLET(75 MCG) BY MOUTH DAILY, Disp: 90 tablet, Rfl: 3     meclizine (ANTIVERT) 25 MG tablet, Take 1 tablet (25 mg) by mouth 3 times daily as needed for dizziness, Disp: 30 tablet, Rfl: 0     meloxicam (MOBIC) 7.5 MG tablet, Take 1 tablet (7.5 mg) by mouth daily, Disp: 30 tablet, Rfl: 1     cetirizine (ZYRTEC) 10 MG tablet, Take 2 tablets (20 mg) by mouth daily (Patient not taking: Reported on 10/6/2023), Disp: 30 tablet, Rfl: 0     diclofenac (VOLTAREN) 1 % topical gel, Apply 4 g topically 4 times daily (Patient not taking: Reported on 10/6/2023), Disp: 350 g, Rfl: 4     omeprazole (PRILOSEC) 20 MG DR capsule, Take 1 capsule (20 mg) by mouth daily (Patient not taking: Reported on 10/6/2023), Disp: 90 capsule,  "Rfl: 3     tiZANidine (ZANAFLEX) 4 MG tablet, Take 1 tablet (4 mg) by mouth 2 times daily as needed for muscle spasms (thoracic back pain) (Patient not taking: Reported on 10/6/2023), Disp: 60 tablet, Rfl: 4     Allergies:  No Known Allergies    Vitals: /87   Pulse 72   Ht 1.51 m (4' 11.45\")   Wt 69.4 kg (153 lb)   BMI 30.44 kg/m    BMI= Body mass index is 30.44 kg/m .    LOWER EXTREMITY PHYSICAL EXAM    Dermatologic: Skin is intact to left lower extremity without significant lesions, rash or abrasion.        Vascular: DP & PT pulses are intact & regular on the left.   CFT and skin temperature is normal to the left lower extremity.     Neurologic: Lower extremity sensation is intact to light touch.  No evidence of weakness in the left lower extremity.        Musculoskeletal: Patient is ambulatory without assistive device or brace.  No gross ankle deformity noted.  No foot or ankle joint effusion is noted.  Noted pain on palpation on the plantar aspect of the left heel near the insertion point of the plantar fascia.  No surrounding erythema or edema noted.  Noted tight gastroc complex on the left.    Diagnostics:  Recent radiographs included calcaneal axial and lateral views of the left foot demonstrating calcaneal spurring with no cortical erosions or periosteal elevation.  All joint margins appear stable.  There is no apparent fracture or tumor formation noted.  There is no evidence of foreign body.  The images were independently reviewed by myself.      ASSESSMENT / PLAN:     ICD-10-CM    1. Plantar fasciitis, left  M72.2 Orthotics and Prosthetics DME Orthotic; Foot Orthotics (functional rigid support); Bilateral     meloxicam (MOBIC) 7.5 MG tablet      2. Pain of left heel  M79.672 Orthopedic  Referral          I have explained to Bridgett and her  about the conditions.  We discussed the underlying contributing factors to the condition as well as treatment options along with expected length " of recovery.  At this time, the patient was educated on the importance of offloading supportive shoes and other devices.  I demonstrated to the patient calf stretches to perform every hour daily until symptoms resolve.  After symptoms resolve, the patient was advised to perform the stretches 3 times daily to prevent future recurrence.  The patient was instructed to perform warm soaks with Epson salt after which to also apply over-the-counter Voltaren gel to deeply massage the injured tissue.  The patient was instructed to do this on a daily basis until symptoms resolve.  The patient was fitted with a silicone heel cushion that will aid in offloading the pressure forces to the soft tissues and prevent further inflammation.  To reduce the amount of current inflammation, the patient was prescribed Mobic 7.5 mg to be taken daily with food and instructed to stop taking if any stomach irritation or swelling in extremities are noted.  The patient was referred to [facility] for custom orthotics that will aid in offloading the tension forces to the soft tissues and prevent further inflammation.  The patient may return in four weeks for reevaluation to determine if any further treatment will be needed.    Bridgett verbalized agreement with and understanding of the rational for the diagnosis and treatment plan.  All questions were answered to best of my ability and the patient's satisfaction. The patient was advised to contact the clinic with any questions that may arise after the clinic visit.      Disclaimer: This note consists of symbols derived from keyboarding, dictation and/or voice recognition software. As a result, there may be errors in the script that have gone undetected. Please consider this when interpreting information found in this chart.       CATHERINE Waters D.P.M., F.A.C.F.A.S.      Again, thank you for allowing me to participate in the care of your patient.         Sincerely,        Galindo Waters DPM

## 2023-10-11 ENCOUNTER — THERAPY VISIT (OUTPATIENT)
Dept: OCCUPATIONAL THERAPY | Facility: CLINIC | Age: 48
End: 2023-10-11
Payer: OTHER MISCELLANEOUS

## 2023-10-11 DIAGNOSIS — M25.531 RIGHT WRIST PAIN: ICD-10-CM

## 2023-10-11 DIAGNOSIS — G56.01 CARPAL TUNNEL SYNDROME OF RIGHT WRIST: Primary | ICD-10-CM

## 2023-10-11 PROCEDURE — 97140 MANUAL THERAPY 1/> REGIONS: CPT | Mod: GO | Performed by: OCCUPATIONAL THERAPIST

## 2023-10-11 PROCEDURE — 97035 APP MDLTY 1+ULTRASOUND EA 15: CPT | Mod: GO | Performed by: OCCUPATIONAL THERAPIST

## 2023-10-11 PROCEDURE — 97110 THERAPEUTIC EXERCISES: CPT | Mod: GO | Performed by: OCCUPATIONAL THERAPIST

## 2023-10-11 NOTE — PROGRESS NOTES
Objective Information for 10/234719:    Pain Level (Scale 0-10)   9/11/2023 9/25/2023 10/11/2023   At Rest 0     With Use 8-9 7 7     Palpation  Pain Report:  - none    + mild    ++ moderate    +++ severe    9/11/2023 9/25/2023 10/11/2023   LEP + + slight -   Extensor Wad + + slight -   PIN Site +  + slight   1st DC ++ + +   Thumb CMC Joint + slight - -   Thenars  - - -   MEP - - -   Flexor Wad + slight - -

## 2023-12-11 PROBLEM — M25.531 RIGHT WRIST PAIN: Status: RESOLVED | Noted: 2023-09-11 | Resolved: 2023-12-11

## 2023-12-11 PROBLEM — G56.01 CARPAL TUNNEL SYNDROME OF RIGHT WRIST: Status: RESOLVED | Noted: 2023-09-11 | Resolved: 2023-12-11

## 2024-02-19 ENCOUNTER — OFFICE VISIT (OUTPATIENT)
Dept: URGENT CARE | Facility: URGENT CARE | Age: 49
End: 2024-02-19
Payer: COMMERCIAL

## 2024-02-19 VITALS
BODY MASS INDEX: 29.58 KG/M2 | HEART RATE: 88 BPM | WEIGHT: 148.7 LBS | RESPIRATION RATE: 16 BRPM | OXYGEN SATURATION: 97 % | DIASTOLIC BLOOD PRESSURE: 87 MMHG | SYSTOLIC BLOOD PRESSURE: 134 MMHG | TEMPERATURE: 98 F

## 2024-02-19 DIAGNOSIS — G44.209 ACUTE NON INTRACTABLE TENSION-TYPE HEADACHE: Primary | ICD-10-CM

## 2024-02-19 PROCEDURE — 99213 OFFICE O/P EST LOW 20 MIN: CPT

## 2024-02-19 RX ORDER — NAPROXEN 500 MG/1
500 TABLET ORAL 2 TIMES DAILY WITH MEALS
Qty: 60 TABLET | Refills: 0 | Status: SHIPPED | OUTPATIENT
Start: 2024-02-19 | End: 2024-08-27

## 2024-02-19 NOTE — PROGRESS NOTES
ASSESSMENT:  (G44.209) Acute non intractable tension-type headache  (primary encounter diagnosis)  Plan: naproxen (NAPROSYN) 500 MG tablet    PLAN:  Tension headache patient instructions discussed and provided.  Informed the patient to take naproxen as prescribed with food for the pain.  We discussed going to the emergency department with any increase in pain, blurred vision, nausea, vomiting, slurred speech, weakness and or difficulty swallowing.  Patient acknowledged her understanding of the above plan.    The use of [employer] services may have been used to construct the content in this note; any grammatical or spelling errors are non-intentional. Please contact the author of this note directly if you are in need of any clarification.      Júnior Pritchard, CORINNE CNP    SUBJECTIVE:  Bridgett Hardin is a 48 year old female who comes in for evaluation of headache.    Headache began 1 week ago.  She states it resolved but then came back two days ago.  She also reports pain down the back part of her neck.   She states that she has had a history of these types of headaches in the past and her 's been able to massage her neck and help with the discomfort.  DESCRIPTION OF HEADACHE:   Character of pain:tightness   Severity of pain: moderate with some severe nature to the pain  Accompanying symptoms: none   Warning signs: None    ROS:  Negative except noted above.     OBJECTIVE:  /87 (BP Location: Left arm, Patient Position: Sitting, Cuff Size: Adult Regular)   Pulse 88   Temp 98  F (36.7  C) (Tympanic)   Resp 16   Wt 67.4 kg (148 lb 11.2 oz)   SpO2 97%   BMI 29.58 kg/m    GENERAL APPEARANCE: healthy, alert and no distress  EYES: EOMI,  PERRL, conjunctiva clear  HENT: ear canals and TM's normal.  Nose and mouth without ulcers, erythema or lesions  NECK: supple, nontender, no lymphadenopathy  RESP: lungs clear to auscultation - no rales, rhonchi or wheezes  CV: regular rates and rhythm,  normal S1 S2, no murmur noted  NEURO: Normal strength and tone, sensory exam grossly normal,  normal speech and mentation.  CNs grossly intact  SKIN: no suspicious lesions or rashes

## 2024-02-19 NOTE — PATIENT INSTRUCTIONS
Take the naproxen as prescribed with food for the pain.  Go to the emergency department with any increase in pain, blurred vision, nausea, vomiting, slurred speech, weakness and/or difficulty swallowing.

## 2024-06-28 ENCOUNTER — PATIENT OUTREACH (OUTPATIENT)
Dept: CARE COORDINATION | Facility: CLINIC | Age: 49
End: 2024-06-28
Payer: COMMERCIAL

## 2024-07-12 ENCOUNTER — PATIENT OUTREACH (OUTPATIENT)
Dept: CARE COORDINATION | Facility: CLINIC | Age: 49
End: 2024-07-12
Payer: COMMERCIAL

## 2024-08-26 SDOH — HEALTH STABILITY: PHYSICAL HEALTH: ON AVERAGE, HOW MANY MINUTES DO YOU ENGAGE IN EXERCISE AT THIS LEVEL?: 30 MIN

## 2024-08-26 SDOH — HEALTH STABILITY: PHYSICAL HEALTH: ON AVERAGE, HOW MANY DAYS PER WEEK DO YOU ENGAGE IN MODERATE TO STRENUOUS EXERCISE (LIKE A BRISK WALK)?: 3 DAYS

## 2024-08-26 ASSESSMENT — SOCIAL DETERMINANTS OF HEALTH (SDOH): HOW OFTEN DO YOU GET TOGETHER WITH FRIENDS OR RELATIVES?: ONCE A WEEK

## 2024-08-27 ENCOUNTER — OFFICE VISIT (OUTPATIENT)
Dept: FAMILY MEDICINE | Facility: CLINIC | Age: 49
End: 2024-08-27
Payer: COMMERCIAL

## 2024-08-27 VITALS
DIASTOLIC BLOOD PRESSURE: 84 MMHG | SYSTOLIC BLOOD PRESSURE: 136 MMHG | TEMPERATURE: 95.6 F | HEIGHT: 59 IN | WEIGHT: 136.2 LBS | RESPIRATION RATE: 18 BRPM | OXYGEN SATURATION: 100 % | BODY MASS INDEX: 27.46 KG/M2 | HEART RATE: 83 BPM

## 2024-08-27 DIAGNOSIS — E03.9 ACQUIRED HYPOTHYROIDISM: ICD-10-CM

## 2024-08-27 DIAGNOSIS — Z12.11 SCREEN FOR COLON CANCER: ICD-10-CM

## 2024-08-27 DIAGNOSIS — Z13.1 SCREENING FOR DIABETES MELLITUS: ICD-10-CM

## 2024-08-27 DIAGNOSIS — B18.1 HEPATITIS B, CHRONIC (H): ICD-10-CM

## 2024-08-27 DIAGNOSIS — Z13.220 SCREENING CHOLESTEROL LEVEL: ICD-10-CM

## 2024-08-27 DIAGNOSIS — Z12.11 SPECIAL SCREENING FOR MALIGNANT NEOPLASMS, COLON: ICD-10-CM

## 2024-08-27 DIAGNOSIS — S46.819A: ICD-10-CM

## 2024-08-27 DIAGNOSIS — Z00.00 ROUTINE GENERAL MEDICAL EXAMINATION AT A HEALTH CARE FACILITY: Primary | ICD-10-CM

## 2024-08-27 LAB
CHOLEST SERPL-MCNC: 172 MG/DL
FASTING STATUS PATIENT QL REPORTED: YES
FASTING STATUS PATIENT QL REPORTED: YES
GLUCOSE SERPL-MCNC: 111 MG/DL (ref 70–99)
HDLC SERPL-MCNC: 46 MG/DL
LDLC SERPL CALC-MCNC: 103 MG/DL
NONHDLC SERPL-MCNC: 126 MG/DL
TRIGL SERPL-MCNC: 117 MG/DL
TSH SERPL DL<=0.005 MIU/L-ACNC: 1.69 UIU/ML (ref 0.3–4.2)

## 2024-08-27 PROCEDURE — 84443 ASSAY THYROID STIM HORMONE: CPT | Performed by: NURSE PRACTITIONER

## 2024-08-27 PROCEDURE — 80061 LIPID PANEL: CPT | Performed by: NURSE PRACTITIONER

## 2024-08-27 PROCEDURE — 99214 OFFICE O/P EST MOD 30 MIN: CPT | Mod: 25 | Performed by: NURSE PRACTITIONER

## 2024-08-27 PROCEDURE — 36415 COLL VENOUS BLD VENIPUNCTURE: CPT | Performed by: NURSE PRACTITIONER

## 2024-08-27 PROCEDURE — 82947 ASSAY GLUCOSE BLOOD QUANT: CPT | Performed by: NURSE PRACTITIONER

## 2024-08-27 PROCEDURE — 99396 PREV VISIT EST AGE 40-64: CPT | Performed by: NURSE PRACTITIONER

## 2024-08-27 RX ORDER — LEVOTHYROXINE SODIUM 75 UG/1
TABLET ORAL
Qty: 90 TABLET | Refills: 3 | Status: SHIPPED | OUTPATIENT
Start: 2024-08-27

## 2024-08-27 RX ORDER — CYCLOBENZAPRINE HCL 10 MG
10 TABLET ORAL
Qty: 90 TABLET | Refills: 1 | Status: SHIPPED | OUTPATIENT
Start: 2024-08-27

## 2024-08-27 NOTE — PATIENT INSTRUCTIONS
Look up levator muscle physical therapy exercises/ stretches on youtube and do twice daily. If not improving with that and as needed muscle relaxer/voltaren gel, please let me know.  Schedule eye exam and colonoscopy. Follow up as needed.     Patient Education   Preventive Care Advice   This is general advice given by our system to help you stay healthy. However, your care team may have specific advice just for you. Please talk to your care team about your preventive care needs.  Nutrition  Eat 5 or more servings of fruits and vegetables each day.  Try wheat bread, brown rice and whole grain pasta (instead of white bread, rice, and pasta).  Get enough calcium and vitamin D. Check the label on foods and aim for 100% of the RDA (recommended daily allowance).  Lifestyle  Exercise at least 150 minutes each week  (30 minutes a day, 5 days a week).  Do muscle strengthening activities 2 days a week. These help control your weight and prevent disease.  No smoking.  Wear sunscreen to prevent skin cancer.  Have a dental exam and cleaning every 6 months.  Yearly exams  See your health care team every year to talk about:  Any changes in your health.  Any medicines your care team has prescribed.  Preventive care, family planning, and ways to prevent chronic diseases.  Shots (vaccines)   HPV shots (up to age 26), if you've never had them before.  Hepatitis B shots (up to age 59), if you've never had them before.  COVID-19 shot: Get this shot when it's due.  Flu shot: Get a flu shot every year.  Tetanus shot: Get a tetanus shot every 10 years.  Pneumococcal, hepatitis A, and RSV shots: Ask your care team if you need these based on your risk.  Shingles shot (for age 50 and up)  General health tests  Diabetes screening:  Starting at age 35, Get screened for diabetes at least every 3 years.  If you are younger than age 35, ask your care team if you should be screened for diabetes.  Cholesterol test: At age 39, start having a  cholesterol test every 5 years, or more often if advised.  Bone density scan (DEXA): At age 50, ask your care team if you should have this scan for osteoporosis (brittle bones).  Hepatitis C: Get tested at least once in your life.  STIs (sexually transmitted infections)  Before age 24: Ask your care team if you should be screened for STIs.  After age 24: Get screened for STIs if you're at risk. You are at risk for STIs (including HIV) if:  You are sexually active with more than one person.  You don't use condoms every time.  You or a partner was diagnosed with a sexually transmitted infection.  If you are at risk for HIV, ask about PrEP medicine to prevent HIV.  Get tested for HIV at least once in your life, whether you are at risk for HIV or not.  Cancer screening tests  Cervical cancer screening: If you have a cervix, begin getting regular cervical cancer screening tests starting at age 21.  Breast cancer scan (mammogram): If you've ever had breasts, begin having regular mammograms starting at age 40. This is a scan to check for breast cancer.  Colon cancer screening: It is important to start screening for colon cancer at age 45.  Have a colonoscopy test every 10 years (or more often if you're at risk) Or, ask your provider about stool tests like a FIT test every year or Cologuard test every 3 years.  To learn more about your testing options, visit:   .  For help making a decision, visit:   https://bit.ly/sn31648.  Prostate cancer screening test: If you have a prostate, ask your care team if a prostate cancer screening test (PSA) at age 55 is right for you.  Lung cancer screening: If you are a current or former smoker ages 50 to 80, ask your care team if ongoing lung cancer screenings are right for you.  For informational purposes only. Not to replace the advice of your health care provider. Copyright   2023 [employer]. All rights reserved. Clinically reviewed by the [facility] Transitions  Program. [employer] [id number] - REV 01/24.  Learning About Stress  What is stress?     Stress is your body's response to a hard situation. Your body can have a physical, emotional, or mental response. Stress is a fact of life for most people, and it affects everyone differently. What causes stress for you may not be stressful for someone else.  A lot of things can cause stress. You may feel stress when you go on a job interview, take a test, or run a race. This kind of short-term stress is normal and even useful. It can help you if you need to work hard or react quickly. For example, stress can help you finish an important job on time.  Long-term stress is caused by ongoing stressful situations or events. Examples of long-term stress include long-term health problems, ongoing problems at work, or conflicts in your family. Long-term stress can harm your health.  How does stress affect your health?  When you are stressed, your body responds as though you are in danger. It makes hormones that speed up your heart, make you breathe faster, and give you a burst of energy. This is called the fight-or-flight stress response. If the stress is over quickly, your body goes back to normal and no harm is done.  But if stress happens too often or lasts too long, it can have bad effects. Long-term stress can make you more likely to get sick, and it can make symptoms of some diseases worse. If you tense up when you are stressed, you may develop neck, shoulder, or low back pain. Stress is linked to high blood pressure and heart disease.  Stress also harms your emotional health. It can make you chi, tense, or depressed. Your relationships may suffer, and you may not do well at work or school.  What can you do to manage stress?  You can try these things to help manage stress:   Do something active. Exercise or activity can help reduce stress. Walking is a great way to get started. Even everyday activities such as housecleaning or yard  work can help.  Try yoga or samia chi. These techniques combine exercise and meditation. You may need some training at first to learn them.  Do something you enjoy. For example, listen to music or go to a movie. Practice your hobby or do volunteer work.  Meditate. This can help you relax, because you are not worrying about what happened before or what may happen in the future.  Do guided imagery. Imagine yourself in any setting that helps you feel calm. You can use online videos, books, or a teacher to guide you.  Do breathing exercises. For example:  From a standing position, bend forward from the waist with your knees slightly bent. Let your arms dangle close to the floor.  Breathe in slowly and deeply as you return to a standing position. Roll up slowly and lift your head last.  Hold your breath for just a few seconds in the standing position.  Breathe out slowly and bend forward from the waist.  Let your feelings out. Talk, laugh, cry, and express anger when you need to. Talking with supportive friends or family, a counselor, or a [employer] leader about your feelings is a healthy way to relieve stress. Avoid discussing your feelings with people who make you feel worse.  Write. It may help to write about things that are bothering you. This helps you find out how much stress you feel and what is causing it. When you know this, you can find better ways to cope.  What can you do to prevent stress?  You might try some of these things to help prevent stress:  Manage your time. This helps you find time to do the things you want and need to do.  Get enough sleep. Your body recovers from the stresses of the day while you are sleeping.  Get support. Your family, friends, and community can make a difference in how you experience stress.  Limit your news feed. Avoid or limit time on social media or news that may make you feel stressed.  Do something active. Exercise or activity can help reduce stress. Walking is a great way to  "get started.  Where can you learn more?  Go to https://www.[employer].net/patiented  Enter N032 in the search box to learn more about \"Learning About Stress.\"  Current as of: October 24, 2023               Content Version: 14.0    8532-2465 [employer].   Care instructions adapted under license by your healthcare professional. If you have questions about a medical condition or this instruction, always ask your healthcare professional. [employer], [employer] disclaims any warranty or liability for your use of this information.         "

## 2024-08-27 NOTE — PROGRESS NOTES
"Preventive Care Visit  [facility] EDITH TOLBERT, Family Medicine  Aug 27, 2024      Assessment & Plan     Routine general medical examination at a health care facility      Screen for colon cancer      Hypothyroidism, unspecified type    - TSH WITH FREE T4 REFLEX; Future  - levothyroxine (SYNTHROID/LEVOTHROID) 75 MCG tablet; TAKE 1 TABLET(75 MCG) BY MOUTH DAILY    Special screening for malignant neoplasms, colon    - Colonoscopy Screening  Referral; Future    Carpal tunnel syndrome of right wrist    - diclofenac (VOLTAREN) 1 % topical gel; Apply 4 g topically 4 times daily.    Heel spur, left    - diclofenac (VOLTAREN) 1 % topical gel; Apply 4 g topically 4 times daily.    Strain of levator scapulae muscle, unspecified laterality, initial encounter    - cyclobenzaprine (FLEXERIL) 10 MG tablet; Take 1 tablet (10 mg) by mouth nightly as needed for muscle spasms (neck pain).    Screening cholesterol level    - Lipid panel reflex to direct LDL Fasting; Future    Screening for diabetes mellitus    - Glucose; Future    Hepatitis B, chronic (H)      Patient has been advised of split billing requirements and indicates understanding: Yes        BMI  Estimated body mass index is 27.46 kg/m  as calculated from the following:    Height as of this encounter: 1.5 m (4' 11.06\").    Weight as of this encounter: 61.8 kg (136 lb 3.2 oz).   Weight management plan: Discussed healthy diet and exercise guidelines    Counseling  Appropriate preventive services were addressed with this patient via screening, questionnaire, or discussion as appropriate for fall prevention, nutrition, physical activity, Tobacco-use cessation, social engagement, weight loss and cognition.  Checklist reviewing preventive services available has been given to the patient.  Reviewed patient's diet, addressing concerns and/or questions.   She is at risk for lack of exercise and has been provided with information to " increase physical activity for the benefit of her well-being.       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  Mental health okay.  Difficulty sleeping due to intermittent neck and shoulder pain.  Discussed levator muscle strain-and treatment options.  She would like to try at home stretches and exercises and a muscle relaxer if needed.  If not improving let us know discussed.  Tolerating her levothyroxine she recently went to [place] and lost prescription.  No changes with hair skin and nails.  History of chronic hep B no longer needing to be monitored per patient as advised from infectious disease.  In agreement to screening labs and colonoscopy.          8/26/2024   General Health   How would you rate your overall physical health? Good   Feel stress (tense, anxious, or unable to sleep) Rather much      (!) STRESS CONCERN      8/26/2024   Nutrition   Three or more servings of calcium each day? Yes   Diet: I don't know   How many servings of fruit and vegetables per day? (!) 2-3   How many sweetened beverages each day? 0-1            8/26/2024   Exercise   Days per week of moderate/strenous exercise 3 days   Average minutes spent exercising at this level 30 min            8/26/2024   Social Factors   Frequency of gathering with friends or relatives Once a week   Worry food won't last until get money to buy more No   Food not last or not have enough money for food? No   Do you have housing? (Housing is defined as stable permanent housing and does not include staying ouside in a car, in a tent, in an abandoned building, in an overnight shelter, or couch-surfing.) Yes   Are you worried about losing your housing? No   Lack of transportation? No   Unable to get  utilities (heat,electricity)? No            8/26/2024   Fall Risk   Fallen 2 or more times in the past year? No   Trouble with walking or balance? No             8/26/2024   Dental   Dentist two times every year? Yes            8/26/2024   TB Screening   Were you born outside of the [place]? Yes            Today's PHQ-2 Score:       8/26/2024    10:58 PM   PHQ-2 ( 1999 [employer])   Q1: Little interest or pleasure in doing things 0   Q2: Feeling down, depressed or hopeless 0   PHQ-2 Score 0   Q1: Little interest or pleasure in doing things Not at all   Q2: Feeling down, depressed or hopeless Not at all   PHQ-2 Score 0           8/26/2024   Substance Use   Alcohol more than 3/day or more than 7/wk No   Do you use any other substances recreationally? No        Social History     Tobacco Use    Smoking status: Never    Smokeless tobacco: Never   Vaping Use    Vaping status: Never Used   Substance Use Topics    Alcohol use: No    Drug use: Yes     Types: Marijuana     Comment: Medical marijuana for chronic back pain           4/21/2023   LAST FHS-7 RESULTS   1st degree relative breast or ovarian cancer No   Any relative bilateral breast cancer No   Any male have breast cancer No   Any ONE woman have BOTH breast AND ovarian cancer No   Any woman with breast cancer before 50yrs No   2 or more relatives with breast AND/OR ovarian cancer No   2 or more relatives with breast AND/OR bowel cancer No           Mammogram Screening - Mammogram every 1-2 years updated in Health Maintenance based on mutual decision making        8/26/2024   STI Screening   New sexual partner(s) since last STI/HIV test? No        History of abnormal Pap smear: No - age 30- 64 PAP with HPV every 5 years recommended        Latest Ref Rng & Units 3/17/2023     9:09 AM 1/23/2018     4:42 PM 1/23/2018     4:40 PM   PAP / HPV   PAP  Negative for Intraepithelial Lesion or Malignancy (NILM)      PAP (Historical)   NIL     HPV 16 DNA Negative Negative   Negative     HPV 18 DNA Negative Negative   Negative    Other HR HPV Negative Negative   Negative      ASCVD Risk   The 10-year ASCVD risk score (Jeet LLAMAS, et al., 2019) is: 1.5%    Values used to calculate the score:      Age: 49 years      Sex: Female      Is Non- : No      Diabetic: No      Tobacco smoker: No      Systolic Blood Pressure: 136 mmHg      Is BP treated: No      HDL Cholesterol: 47 mg/dL      Total Cholesterol: 173 mg/dL           Reviewed and updated as needed this visit by Provider   Tobacco  Allergies  Meds  Problems  Med Hx  Surg Hx  Fam Hx            Past Medical History:   Diagnosis Date    Thyroid disease      Past Surgical History:   Procedure Laterality Date    COMBINED ESOPHAGOSCOPY, GASTROSCOPY, DUODENOSCOPY (EGD) WITH CO2 INSUFFLATION N/A 2023    Procedure: Combined Esophagoscopy, Gastroscopy, Duodenoscopy (Egd) With Co2 Insufflation;  Surgeon: Naomi Bray DO;  Location: [facility] OR    DILATION AND CURETTAGE SUCTION N/A 2018    Procedure: DILATION AND CURETTAGE SUCTION;  Suction D & C;  Surgeon: Edmond Garvin MD;  Location: [facility]    ESOPHAGOSCOPY, GASTROSCOPY, DUODENOSCOPY (EGD), COMBINED N/A 2023    Procedure: ESOPHAGOGASTRODUODENOSCOPY, WITH BIOPSY;  Surgeon: Naomi Bray DO;  Location: [facility] OR     OB History    Para Term  AB Living   5 3 3 0 2 3   SAB IAB Ectopic Multiple Live Births   2 0 0 0 0      # Outcome Date GA Lbr Ivan/2nd Weight Sex Type Anes PTL Lv   5 SAB 19           4 SAB 18 11w1d    AB, MISSED Local        Complications: History of D&C   3 Term 10/21/14   3.005 kg (6 lb 10 oz) F       2 Term 13   3.884 kg (8 lb 9 oz) M   N    1 Term 07   3.459 kg (7 lb 10 oz) M         Lab work is in process  Labs reviewed in EPIC  BP Readings from Last 3 Encounters:   24 136/84   24 134/87   10/06/23 134/87    Wt Readings from Last 3 Encounters:   24 61.8 kg (136 lb 3.2 oz)    24 67.4 kg (148 lb 11.2 oz)   10/06/23 69.4 kg (153 lb)                  Patient Active Problem List   Diagnosis    Hypothyroidism, unspecified type    Supervision of high-risk pregnancy of elderly multigravida    Cold sore    Hepatitis B, chronic (H)    Hepatitis C, chronic (H)    Threatened     History of miscarriage    Recurrent UTI    Missed period    Chronic bilateral thoracic back pain    Gastroesophageal reflux disease with esophagitis without hemorrhage    Strain of levator scapulae muscle, unspecified laterality, initial encounter     Past Surgical History:   Procedure Laterality Date    COMBINED ESOPHAGOSCOPY, GASTROSCOPY, DUODENOSCOPY (EGD) WITH CO2 INSUFFLATION N/A 2023    Procedure: Combined Esophagoscopy, Gastroscopy, Duodenoscopy (Egd) With Co2 Insufflation;  Surgeon: Naomi Bray DO;  Location: [facility]    DILATION AND CURETTAGE SUCTION N/A 2018    Procedure: DILATION AND CURETTAGE SUCTION;  Suction D & C;  Surgeon: Edmond Garvin MD;  Location: [facility]    ESOPHAGOSCOPY, GASTROSCOPY, DUODENOSCOPY (EGD), COMBINED N/A 2023    Procedure: ESOPHAGOGASTRODUODENOSCOPY, WITH BIOPSY;  Surgeon: Naomi Bary DO;  Location: [facility] OR       Social History     Tobacco Use    Smoking status: Never    Smokeless tobacco: Never   Substance Use Topics    Alcohol use: No     History reviewed. No pertinent family history.      Current Outpatient Medications   Medication Sig Dispense Refill    cyclobenzaprine (FLEXERIL) 10 MG tablet Take 1 tablet (10 mg) by mouth nightly as needed for muscle spasms (neck pain). 90 tablet 1    diclofenac (VOLTAREN) 1 % topical gel Apply 4 g topically 4 times daily. 350 g 4    levothyroxine (SYNTHROID/LEVOTHROID) 75 MCG tablet TAKE 1 TABLET(75 MCG) BY MOUTH DAILY 90 tablet 3     No Known Allergies  Recent Labs   Lab Test 23  1734 23  1050 23  0933 22  0822 22  1633 22  1054 22  1054 20  1542 20  1001  "04/16/18  1111 01/23/18  1650   A1C  --   --   --   --  6.3*  --   --   --   --   --  5.8   LDL  --  97  --   --   --   --  83  --   --   --  88   HDL  --  47*  --   --   --   --  54  --   --   --  56   TRIG  --  145  --   --   --   --  130  --   --   --  94   ALT  --   --  25 44  --   --   --   --  33   < >  --    CR 0.69 0.71  --  0.65  --    < >  --   --   --   --   --    GFRESTIMATED >90 >90  --  >90  --    < >  --   --   --   --   --    POTASSIUM 4.4 4.0  --  4.1  --   --   --   --   --   --   --    TSH  --  1.73 1.21 1.63  --   --  5.17*   < > 1.47   < > 5.85*    < > = values in this interval not displayed.          Review of Systems  CONSTITUTIONAL: NEGATIVE for fever, chills, change in weight  INTEGUMENTARY/SKIN: NEGATIVE for worrisome rashes, moles or lesions  EYES: NEGATIVE for vision changes or irritation  ENT/MOUTH: NEGATIVE for ear, mouth and throat problems  RESP: NEGATIVE for significant cough or SOB  BREAST: NEGATIVE for masses, tenderness or discharge  CV: NEGATIVE for chest pain, palpitations or peripheral edema  GI: NEGATIVE for nausea, abdominal pain, heartburn, or change in bowel habits  : NEGATIVE for frequency, dysuria, or hematuria  MUSCULOSKELETAL: NEGATIVE for significant arthralgias or myalgia  NEURO: NEGATIVE for weakness, dizziness or paresthesias  ENDOCRINE: NEGATIVE for temperature intolerance, skin/hair changes  HEME: NEGATIVE for bleeding problems  PSYCHIATRIC: NEGATIVE for changes in mood or affect     Objective    Exam  /84   Pulse 83   Temp (!) 95.6  F (35.3  C) (Temporal)   Resp 18   Ht 1.5 m (4' 11.06\")   Wt 61.8 kg (136 lb 3.2 oz)   LMP 08/05/2024 (Exact Date)   SpO2 100%   BMI 27.46 kg/m     Estimated body mass index is 27.46 kg/m  as calculated from the following:    Height as of this encounter: 1.5 m (4' 11.06\").    Weight as of this encounter: 61.8 kg (136 lb 3.2 oz).    Physical Exam  GENERAL: alert and no distress  EYES: Eyes grossly normal to inspection, " PERRL and conjunctivae and sclerae normal  HENT: ear canals and TM's normal, nose and mouth without ulcers or lesions  NECK: no adenopathy, no asymmetry, masses, or scars  RESP: lungs clear to auscultation - no rales, rhonchi or wheezes  BREAST: Deferred per guidelines-asymptomatic per patient.  Discussed self breast exam, symptoms to watch out for and when to follow-up.  Discussed mammogram  CV: regular rate and rhythm, normal S1 S2, no S3 or S4, no murmur, click or rub, no peripheral edema  ABDOMEN: soft, nontender, no hepatosplenomegaly, no masses and bowel sounds normal   (female): Deferred per patient no concerns  MS: no gross musculoskeletal defects noted, no edema, no CVA tenderness POSITIVE pain and tension along R levator muscle  SKIN: no suspicious lesions or rashes  NEURO: Normal strength and tone, cranial nerves intact, mentation intact and speech normal  PSYCH: mentation appears normal, affect normal/bright  LYMPH: no cervical, supraclavicular adenopathy        Signed Electronically by: EDITH LÓPEZ

## 2024-08-28 NOTE — RESULT ENCOUNTER NOTE
Ricardo Urias,    Thank you for your recent office visit.    Here are your recent results.  Glucose is elevated but still considered normal under 127 when fasting.  Cholesterol is considered normal for a nondiabetic when the bad cholesterol, the LDL is under 190.  Normal thyroid level.    Feel free to contact me via [employer] or call the clinic at [phone].    Sincerely,    America Andre, CORINNE, FNP-BC

## 2024-09-19 ENCOUNTER — ORDERS ONLY (AUTO-RELEASED) (OUTPATIENT)
Dept: FAMILY MEDICINE | Facility: CLINIC | Age: 49
End: 2024-09-19
Payer: COMMERCIAL

## 2024-09-19 ENCOUNTER — TELEPHONE (OUTPATIENT)
Dept: FAMILY MEDICINE | Facility: CLINIC | Age: 49
End: 2024-09-19
Payer: COMMERCIAL

## 2024-09-19 DIAGNOSIS — Z12.11 SPECIAL SCREENING FOR MALIGNANT NEOPLASMS, COLON: ICD-10-CM

## 2024-09-19 DIAGNOSIS — Z12.11 SPECIAL SCREENING FOR MALIGNANT NEOPLASMS, COLON: Primary | ICD-10-CM

## 2024-09-19 NOTE — TELEPHONE ENCOUNTER
General Call      Reason for Call:  Pt would like to know if she could get the kit instead of doing the colonoscopy.    What are your questions or concerns:  Doesn't have time and would prefer to do the kit at home.    Date of last appointment with provider: 8/27/24    Could we send this information to you in yetu or would you prefer to receive a phone call?:   Patient would prefer a phone call   Okay to leave a detailed message?: Yes at Cell number on file:    Telephone Information:   Mobile [phone]

## 2024-10-15 LAB — NONINV COLON CA DNA+OCC BLD SCRN STL QL: NEGATIVE

## 2025-03-24 ENCOUNTER — PATIENT OUTREACH (OUTPATIENT)
Dept: CARE COORDINATION | Facility: CLINIC | Age: 50
End: 2025-03-24
Payer: COMMERCIAL

## 2025-06-28 ENCOUNTER — HEALTH MAINTENANCE LETTER (OUTPATIENT)
Age: 50
End: 2025-06-28

## 2025-07-26 ENCOUNTER — MYC REFILL (OUTPATIENT)
Dept: FAMILY MEDICINE | Facility: CLINIC | Age: 50
End: 2025-07-26
Payer: COMMERCIAL

## 2025-07-26 DIAGNOSIS — E03.9 ACQUIRED HYPOTHYROIDISM: ICD-10-CM

## 2025-07-28 ENCOUNTER — PATIENT OUTREACH (OUTPATIENT)
Dept: CARE COORDINATION | Facility: CLINIC | Age: 50
End: 2025-07-28
Payer: COMMERCIAL

## 2025-07-28 RX ORDER — LEVOTHYROXINE SODIUM 75 UG/1
TABLET ORAL
Qty: 90 TABLET | Refills: 0 | Status: SHIPPED | OUTPATIENT
Start: 2025-07-28

## 2025-08-11 ENCOUNTER — PATIENT OUTREACH (OUTPATIENT)
Dept: CARE COORDINATION | Facility: CLINIC | Age: 50
End: 2025-08-11
Payer: COMMERCIAL

## (undated) DEVICE — TUBING VACUUM COLLECTION 6FT 23116

## (undated) DEVICE — SOL WATER IRRIG 1000ML BOTTLE 07139-09

## (undated) DEVICE — DRAPE SHEET HALF 40X60" 9358

## (undated) DEVICE — ENDO FORCEP ENDOJAW BIOPSY 2.0MMX155CM FB-221K

## (undated) DEVICE — DRAPE LEGGINGS 8421

## (undated) DEVICE — PAD PERI W/WINGS 1580A

## (undated) DEVICE — PACK MINOR SBA15MIFSE

## (undated) DEVICE — PREP SKIN SCRUB TRAY 4461A

## (undated) DEVICE — GLOVE PROTEXIS W/NEU-THERA 7.5  2D73TE75

## (undated) RX ORDER — KETOROLAC TROMETHAMINE 30 MG/ML
INJECTION, SOLUTION INTRAMUSCULAR; INTRAVENOUS
Status: DISPENSED
Start: 2018-05-11

## (undated) RX ORDER — CEFAZOLIN SODIUM 1 G/3ML
INJECTION, POWDER, FOR SOLUTION INTRAMUSCULAR; INTRAVENOUS
Status: DISPENSED
Start: 2018-05-11

## (undated) RX ORDER — FENTANYL CITRATE 50 UG/ML
INJECTION, SOLUTION INTRAMUSCULAR; INTRAVENOUS
Status: DISPENSED
Start: 2018-05-11

## (undated) RX ORDER — ACETAMINOPHEN 325 MG/1
TABLET ORAL
Status: DISPENSED
Start: 2018-05-11

## (undated) RX ORDER — DEXAMETHASONE SODIUM PHOSPHATE 4 MG/ML
INJECTION, SOLUTION INTRA-ARTICULAR; INTRALESIONAL; INTRAMUSCULAR; INTRAVENOUS; SOFT TISSUE
Status: DISPENSED
Start: 2018-05-11

## (undated) RX ORDER — ONDANSETRON 2 MG/ML
INJECTION INTRAMUSCULAR; INTRAVENOUS
Status: DISPENSED
Start: 2018-05-11